# Patient Record
Sex: MALE | Race: WHITE | Employment: UNEMPLOYED | URBAN - METROPOLITAN AREA
[De-identification: names, ages, dates, MRNs, and addresses within clinical notes are randomized per-mention and may not be internally consistent; named-entity substitution may affect disease eponyms.]

---

## 2017-01-26 ENCOUNTER — OFFICE VISIT (OUTPATIENT)
Dept: FAMILY MEDICINE CLINIC | Age: 57
End: 2017-01-26

## 2017-01-26 ENCOUNTER — HOSPITAL ENCOUNTER (OUTPATIENT)
Dept: LAB | Age: 57
Discharge: HOME | End: 2017-01-26

## 2017-01-26 VITALS
OXYGEN SATURATION: 95 % | BODY MASS INDEX: 27.31 KG/M2 | HEART RATE: 66 BPM | DIASTOLIC BLOOD PRESSURE: 70 MMHG | TEMPERATURE: 97.7 F | WEIGHT: 174 LBS | RESPIRATION RATE: 18 BRPM | HEIGHT: 67 IN | SYSTOLIC BLOOD PRESSURE: 120 MMHG

## 2017-01-26 DIAGNOSIS — I10 ESSENTIAL HYPERTENSION: ICD-10-CM

## 2017-01-26 DIAGNOSIS — Z00.00 MEDICARE ANNUAL WELLNESS VISIT, INITIAL: ICD-10-CM

## 2017-01-26 DIAGNOSIS — F41.9 ANXIETY: ICD-10-CM

## 2017-01-26 DIAGNOSIS — M54.2 CERVICAL PAIN: ICD-10-CM

## 2017-01-26 DIAGNOSIS — Z11.59 NEED FOR HEPATITIS C SCREENING TEST: ICD-10-CM

## 2017-01-26 DIAGNOSIS — Z71.89 ADVANCE CARE PLANNING: ICD-10-CM

## 2017-01-26 DIAGNOSIS — J45.40 MODERATE PERSISTENT ASTHMA WITHOUT COMPLICATION: Primary | ICD-10-CM

## 2017-01-26 PROCEDURE — 99001 SPECIMEN HANDLING PT-LAB: CPT | Performed by: FAMILY MEDICINE

## 2017-01-26 RX ORDER — TIZANIDINE 4 MG/1
4 TABLET ORAL
Qty: 40 TAB | Refills: 3 | Status: SHIPPED | OUTPATIENT
Start: 2017-01-26 | End: 2018-02-09 | Stop reason: SDUPTHER

## 2017-01-26 RX ORDER — AMLODIPINE BESYLATE 5 MG/1
5 TABLET ORAL DAILY
Qty: 30 TAB | Refills: 1 | Status: SHIPPED | OUTPATIENT
Start: 2017-01-26 | End: 2017-03-31 | Stop reason: SDUPTHER

## 2017-01-26 RX ORDER — CELECOXIB 200 MG/1
200 CAPSULE ORAL 2 TIMES DAILY
Qty: 180 CAP | Refills: 3 | Status: SHIPPED | OUTPATIENT
Start: 2017-01-26 | End: 2018-04-16 | Stop reason: SDUPTHER

## 2017-01-26 RX ORDER — LORAZEPAM 1 MG/1
1 TABLET ORAL
Qty: 30 TAB | Refills: 2 | Status: SHIPPED | OUTPATIENT
Start: 2017-01-26 | End: 2017-03-23 | Stop reason: SDUPTHER

## 2017-01-26 RX ORDER — LOSARTAN POTASSIUM 25 MG/1
25 TABLET ORAL DAILY
Qty: 90 TAB | Refills: 3 | Status: CANCELLED | OUTPATIENT
Start: 2017-01-26

## 2017-01-26 RX ORDER — CITALOPRAM 10 MG/1
10 TABLET ORAL EVERY EVENING
Qty: 90 TAB | Refills: 3 | Status: SHIPPED | OUTPATIENT
Start: 2017-01-26 | End: 2017-12-18

## 2017-01-26 RX ORDER — FLUTICASONE PROPIONATE 44 UG/1
1 AEROSOL, METERED RESPIRATORY (INHALATION) 2 TIMES DAILY
Qty: 1 INHALER | Refills: 2 | Status: SHIPPED | OUTPATIENT
Start: 2017-01-26 | End: 2017-08-22 | Stop reason: SDUPTHER

## 2017-01-26 RX ORDER — ALBUTEROL SULFATE 90 UG/1
2 AEROSOL, METERED RESPIRATORY (INHALATION)
Qty: 1 INHALER | Refills: 12 | Status: SHIPPED | OUTPATIENT
Start: 2017-01-26 | End: 2017-08-22 | Stop reason: SDUPTHER

## 2017-01-26 NOTE — PATIENT INSTRUCTIONS

## 2017-01-26 NOTE — PROGRESS NOTES
(AWV) The Initial Medicare Annual Wellness Exam PROGRESS NOTE    This is an Initial Medicare Annual Wellness Exam (AWV) (Performed 12 months after IPPE or effective date of Medicare Part B enrollment, Once in a lifetime)    I have reviewed the patient's medical history in detail and updated the computerized patient record. Nicol Terry is a 61 y.o.  male and presents for an annual wellness exam       Patient Active Problem List   Diagnosis Code    Asthma J45.909    Tobacco abuse Z72.0    GERD (gastroesophageal reflux disease) K21.9    Chronic pain G89.29    Anxiety F41.9    Bilateral primary osteoarthritis of hip M16.0     Patient Active Problem List    Diagnosis Date Noted    Bilateral primary osteoarthritis of hip 07/14/2016    Anxiety 05/03/2016    Chronic pain 03/07/2016    GERD (gastroesophageal reflux disease) 06/11/2014    Asthma 11/12/2013    Tobacco abuse 11/12/2013     Current Outpatient Prescriptions   Medication Sig Dispense Refill    tiZANidine (ZANAFLEX) 4 mg tablet Take 1 Tab by mouth every six (6) hours as needed. 40 Tab 0    LORazepam (ATIVAN) 1 mg tablet Take 1 Tab by mouth every eight (8) hours as needed for Anxiety. Max Daily Amount: 3 mg. 30 Tab 1    celecoxib (CELEBREX) 200 mg capsule Take  by mouth two (2) times a day.  citalopram (CELEXA) 10 mg tablet Take 1 Tab by mouth every evening. Indications: GENERALIZED ANXIETY DISORDER 30 Tab 1    losartan (COZAAR) 25 mg tablet Take 1 Tab by mouth daily. Indications: HYPERTENSION 90 Tab 3    albuterol (PROVENTIL HFA, VENTOLIN HFA, PROAIR HFA) 90 mcg/actuation inhaler Take 2 Puffs by inhalation every six (6) hours as needed for Wheezing or Shortness of Breath. 1 Inhaler 12    amLODIPine (NORVASC) 5 mg tablet Take 1 Tab by mouth daily. 30 Tab 1    naproxen sodium (ALEVE) 220 mg cap Take  by mouth.  AMITIZA 24 mcg capsule Take 1 Cap by mouth two (2) times daily (with meals).  (Patient taking differently: Take 24 mcg by mouth two (2) times daily (with meals). Indications: CHRONIC IDIOPATHIC CONSTIPATION) 60 Cap 3     Allergies   Allergen Reactions    Neurontin [Gabapentin] Other (comments)     Upset stomach     Past Medical History   Diagnosis Date    Anxiety     Arthritis      osteo-hips    Asthma      as a child only    Bilateral primary osteoarthritis of hip 7/14/2016    Broken rib april 2013     3 broken ribs s/p fall with hemothorax    Chronic pain      hips, legs and knees    Hypertension     Psychiatric disorder      anxiety     Past Surgical History   Procedure Laterality Date    Hx lumbar diskectomy      Hx hernia repair Left     Hx mohs procedure       (R) rotator cuff     Family History   Problem Relation Age of Onset    Dementia Mother     Cancer Father 61     Social History   Substance Use Topics    Smoking status: Current Every Day Smoker     Packs/day: 0.25     Years: 30.00    Smokeless tobacco: Never Used      Comment: pt will try to quit smoking    Alcohol use No      Comment: quit 1/2016       ROS   General ROS: negative for - chills or fever  Psychological ROS: positive for - anxiety treated as needed with ativan  Ophthalmic ROS: positive for - uses glasses  ENT ROS: negative for - headaches  Endocrine ROS: negative for - polydipsia/polyuria or temperature intolerance  Respiratory ROS: no cough, shortness of breath; + wheezing which responds quickly to albuterol  Cardiovascular ROS: no chest pain or dyspnea on exertion  Gastrointestinal ROS: no abdominal pain, change in bowel habits, or black or bloody stools  Genito-Urinary ROS: no dysuria, trouble voiding, or hematuria  Musculoskeletal ROS: negative for - pain in hip - bilateral  Neurological ROS: no TIA or stroke symptoms  Dermatological ROS: negative for - rash or skin lesion changes    All other systems reviewed and are negative.     History     Past Medical History   Diagnosis Date    Anxiety     Arthritis      osteo-hips  Asthma      as a child only    Bilateral primary osteoarthritis of hip 7/14/2016    Broken rib april 2013     3 broken ribs s/p fall with hemothorax    Chronic pain      hips, legs and knees    Hypertension     Psychiatric disorder      anxiety      Past Surgical History   Procedure Laterality Date    Hx lumbar diskectomy      Hx hernia repair Left     Hx mohs procedure       (R) rotator cuff     Current Outpatient Prescriptions   Medication Sig Dispense Refill    tiZANidine (ZANAFLEX) 4 mg tablet Take 1 Tab by mouth every six (6) hours as needed. 40 Tab 0    LORazepam (ATIVAN) 1 mg tablet Take 1 Tab by mouth every eight (8) hours as needed for Anxiety. Max Daily Amount: 3 mg. 30 Tab 1    celecoxib (CELEBREX) 200 mg capsule Take  by mouth two (2) times a day.  citalopram (CELEXA) 10 mg tablet Take 1 Tab by mouth every evening. Indications: GENERALIZED ANXIETY DISORDER 30 Tab 1    losartan (COZAAR) 25 mg tablet Take 1 Tab by mouth daily. Indications: HYPERTENSION 90 Tab 3    albuterol (PROVENTIL HFA, VENTOLIN HFA, PROAIR HFA) 90 mcg/actuation inhaler Take 2 Puffs by inhalation every six (6) hours as needed for Wheezing or Shortness of Breath. 1 Inhaler 12    amLODIPine (NORVASC) 5 mg tablet Take 1 Tab by mouth daily. 30 Tab 1    naproxen sodium (ALEVE) 220 mg cap Take  by mouth.  AMITIZA 24 mcg capsule Take 1 Cap by mouth two (2) times daily (with meals). (Patient taking differently: Take 24 mcg by mouth two (2) times daily (with meals).  Indications: CHRONIC IDIOPATHIC CONSTIPATION) 60 Cap 3     Allergies   Allergen Reactions    Neurontin [Gabapentin] Other (comments)     Upset stomach     Family History   Problem Relation Age of Onset    Dementia Mother     Cancer Father 61     Social History   Substance Use Topics    Smoking status: Current Every Day Smoker     Packs/day: 0.25     Years: 30.00    Smokeless tobacco: Never Used      Comment: pt will try to quit smoking    Alcohol use No      Comment: quit 1/2016     Patient Active Problem List   Diagnosis Code    Asthma J45.909    Tobacco abuse Z72.0    GERD (gastroesophageal reflux disease) K21.9    Chronic pain G89.29    Anxiety F41.9    Bilateral primary osteoarthritis of hip M16.0       Health Maintenance History  Immunizations reviewed, dtap due , pneumovax up to date, flu up to date, zoster N/A  Colonoscopy: up to date,   Eye exam: due      Depression Risk Factor Screening:      Patient Health Questionnaire (PHQ-2)   Over the last 2 weeks, how often have you been bothered by any of the following problems? · Little interest or pleasure in doing things? · Not at all. [0]  · Feeling down, depressed, or hopeless? · Not at all. [0]    Total Score: 0/6  PHQ-2 Assessment Scoring:   A score of 2 or more requires further screening with the PHQ-9    Alcohol Risk Factor Screening:     Men: On any occasion during the past 3 months, have you had more than 4 drinks containing alcohol?  no  Do you average more than 14 drinks per week?  no    Functional Ability and Level of Safety:     Hearing Loss    none    Activities of Daily Living   Self-care. Requires assistance with: no ADLs    Fall Risk   No fall risk factors    Abuse Screen   None    Examination   Physical Examination  Vitals:    01/26/17 1432   BP: 120/70   Pulse: 66   Resp: 18   Temp: 97.7 °F (36.5 °C)   TempSrc: Oral   SpO2: 95%   Weight: 174 lb (78.9 kg)   Height: 5' 7\" (1.702 m)   PainSc:   0 - No pain      Body mass index is 27.25 kg/(m^2).      Evaluation of Cognitive Function:  Mood/affect:good mood with appropriate affect  Appearance: well kempt  Family member/caregiver input: N/A    alert, well appearing, and in no distress, oriented to person, place, and time and overweight    Patient Care Team:  Hood Webb MD as PCP - General (Family Practice)  Alesia Dover MD (Family Practice)  Audrey Diaz MD (Surgery)    End-of-life planning  Advanced Directive in the case than an injury or illness causes the patient to be unable to make health care decisions    Health Care Directive or Living Will: yes    Advice/Referrals/Counselling/Plan:   Education and counseling provided:  Advance care planning  Hep C screening  weight loss, physical activity, smoking cessation, fall prevention, and nutrition)    ICD-10-CM ICD-9-CM    1. Moderate persistent asthma without complication M88.14 779.53 albuterol (PROVENTIL HFA, VENTOLIN HFA, PROAIR HFA) 90 mcg/actuation inhaler      fluticasone (FLOVENT HFA) 44 mcg/actuation inhaler   2. Essential hypertension I10 401.9    3. Anxiety F41.9 300.00 LORazepam (ATIVAN) 1 mg tablet      citalopram (CELEXA) 10 mg tablet   4. Elevated blood pressure I10 401.9 amLODIPine (NORVASC) 5 mg tablet   5. Cervical pain M54.2 723.1 tiZANidine (ZANAFLEX) 4 mg tablet   6. Medicare annual wellness visit, initial Z00.00 V70.0 HEMOGLOBIN A1C WITH EAG      LIPID PANEL   7. Advance care planning Z71.89 V65.49    8. Need for hepatitis C screening test Z11.59 V73.89 HEPATITIS C AB   . Brief written plan, checklist    I have discussed the diagnosis with the patient and the intended plan as seen in the above orders. The patient has received an after-visit summary and questions were answered concerning future plans. I have discussed medication side effects and warnings with the patient as well. I have reviewed the plan of care with the patient, accepted their input and they are in agreement with the treatment goals. Follow-up Disposition:  Return in about 3 months (around 4/26/2017) for medication evaluation. ____________________________________________________________    Problem Assessment    for treatment of   Chief Complaint   Patient presents with    Annual Wellness Visit         SUBJECTIVE    Hypertension  Patient is here for follow-up of hypertension. He indicates that he is feeling well and denies any symptoms referable to his hypertension.  He is exercising and is adherent to low salt diet. Blood pressure is well controlled at home. Use of agents associated with hypertension: NSAIDS. Well Adult Physical   Patient here for a comprehensive physical exam.The patient reports problems - anxiety, asthma, osteoarthritis  Do you take any herbs or supplements that were not prescribed by a doctor? no Are you taking calcium supplements? no Are you taking aspirin daily? no    Asthma Review:  The patient is being seen for follow up of asthma, not currently in exacerbation. Asthma symptoms occur: > 2 times per week. Wheezing when present is described as mild-moderate and easily relieved with rescue bronchodilator. Current limitations in activity from asthma: none. Number of days of school or work missed in the last month: 0. Frequency of use of quick-relief meds: > 2 times per week. Regimen compliance: The patient reports adherence to this regimen. Patient does smoke cigarettes now less than 1/2 pack. Anxiety Review:  Patient is seen for anxiety disorder. Current treatment includes Celexa, Ativan and no other therapies. Ongoing symptoms include: palpitations, dizziness, racing thoughts, psychomotor agitation, feelings of losing control, difficulty concentrating but improved. Patient denies: chest pain, suicidal ideation. Reported side effects from the treatment: none. Visit Vitals    /70 (BP 1 Location: Left arm, BP Patient Position: Sitting)    Pulse 66    Temp 97.7 °F (36.5 °C) (Oral)    Resp 18    Ht 5' 7\" (1.702 m)    Wt 174 lb (78.9 kg)    SpO2 95%    BMI 27.25 kg/m2     General:  Alert, cooperative, no distress, appears stated age. Head:  Normocephalic, without obvious abnormality, atraumatic. Eyes:  Conjunctivae/corneas clear. PERRL, EOMs intact. Fundi benign   Ears:  Normal TMs and external ear canals both ears. Nose: Nares normal. Septum midline. Mucosa normal. No drainage or sinus tenderness.    Throat: Lips, mucosa, and tongue normal. Teeth and gums normal.   Neck: Supple, symmetrical, trachea midline, no adenopathy, thyroid: no enlargement/tenderness/nodules, no carotid bruit and no JVD. Back:   Symmetric, no curvature. ROM normal. No CVA tenderness. Lungs:   Clear to auscultation bilaterally. Chest wall:  No tenderness or deformity. Heart:  Regular rate and rhythm, S1, S2 normal, no murmur, click, rub or gallop. Abdomen:   Soft, non-tender. Bowel sounds normal. No masses,  No organomegaly. Genitalia:  deferred   Rectal:  deferred   Extremities: Extremities normal, atraumatic, no cyanosis or edema. Pulses: 2+ and symmetric all extremities. Skin: Skin color, texture, turgor normal. No rashes or lesions   Lymph nodes: Cervical, supraclavicular, and axillary nodes normal.   Neurologic: CNII-XII intact. Normal strength, sensation and reflexes throughout. Assessment/Plan:    1. Essential hypertension  Goal < 140/90; currently well controlled; continue treatment  - amLODIPine (NORVASC) 5 mg tablet; Take 1 Tab by mouth daily. Dispense: 30 Tab; Refill: 1    2. Anxiety  Continue current regimen  - LORazepam (ATIVAN) 1 mg tablet; Take 1 Tab by mouth every eight (8) hours as needed for Anxiety. Max Daily Amount: 3 mg. Dispense: 30 Tab; Refill: 2  - citalopram (CELEXA) 10 mg tablet; Take 1 Tab by mouth every evening. Indications: GENERALIZED ANXIETY DISORDER  Dispense: 90 Tab; Refill: 3    3. Cervical pain  Improved; continue muscle relaxer for use as needed  - tiZANidine (ZANAFLEX) 4 mg tablet; Take 1 Tab by mouth every six (6) hours as needed. Dispense: 40 Tab; Refill: 3    4. Moderate persistent asthma without complication  Encourage smoking cessation; start fluticasone due to frequency of rescue inhaler use  - albuterol (PROVENTIL HFA, VENTOLIN HFA, PROAIR HFA) 90 mcg/actuation inhaler; Take 2 Puffs by inhalation every six (6) hours as needed for Wheezing or Shortness of Breath. Dispense: 1 Inhaler;  Refill: 12  - fluticasone (FLOVENT HFA) 44 mcg/actuation inhaler; Take 1 Puff by inhalation two (2) times a day. Indications: MAINTENANCE THERAPY FOR ASTHMA  Dispense: 1 Inhaler; Refill: 2    5. Medicare annual wellness visit, initial  Reviewed preventive recommendations  - HEMOGLOBIN A1C WITH EAG; Future  - LIPID PANEL; Future    6. Advance care planning  See ACP    7. Need for hepatitis C screening test    - HEPATITIS C AB;  Future      Lab review: orders written for new lab studies as appropriate; see orders

## 2017-01-26 NOTE — PROGRESS NOTES
Alexander Alcantara is a 61 y.o. male presents today for medication evaluation and medication refill Ativan, Celebrex, Losartan, Albuterol

## 2017-01-26 NOTE — MR AVS SNAPSHOT
Visit Information Date & Time Provider Department Dept. Phone Encounter #  
 1/26/2017  2:15 PM Miguel Rodríguez, [address] [phone] [id number] Follow-up Instructions Return in about 3 months (around 4/26/2017) for medication evaluation. Upcoming Health Maintenance Date Due Hepatitis C Screening 1960 DTaP/Tdap/Td series (1 - Tdap) 3/14/1980 COLONOSCOPY 6/11/2024 Allergies as of 1/26/2017  Review Complete On: 1/26/2017 By: Miguel Rodríguez MD  
  
 Severity Noted Reaction Type Reactions Neurontin [Gabapentin]  06/11/2014    Other (comments) Upset stomach Current Immunizations  Reviewed on 9/6/2016 Name Date Influenza Vaccine (Quad) PF 9/6/2016 11:52 AM, 11/6/2015  2:37 PM  
 Influenza Vaccine PF 11/14/2013  1:31 PM  
 Pneumococcal Polysaccharide (PPSV-23) 2/5/2016 Not reviewed this visit You Were Diagnosed With   
  
 Codes Comments Moderate persistent asthma without complication    -  Primary ICD-10-CM: J45.40 ICD-9-CM: 493.90 Essential hypertension     ICD-10-CM: I10 
ICD-9-CM: 401.9 Anxiety     ICD-10-CM: F41.9 ICD-9-CM: 300.00 Elevated blood pressure     ICD-10-CM: I10 
ICD-9-CM: 401.9 Cervical pain     ICD-10-CM: M54.2 ICD-9-CM: 723.1 Medicare annual wellness visit, initial     ICD-10-CM: Z00.00 ICD-9-CM: V70.0 Advance care planning     ICD-10-CM: Z71.89 ICD-9-CM: V65.49 Need for hepatitis C screening test     ICD-10-CM: Z11.59 
ICD-9-CM: V73.89 Vitals BP Pulse Temp Resp Height(growth percentile) Weight(growth percentile) 120/70 (BP 1 Location: Left arm, BP Patient Position: Sitting) 66 97.7 °F (36.5 °C) (Oral) 18 5' 7\" (1.702 m) 174 lb (78.9 kg) SpO2 BMI Smoking Status 95% 27.25 kg/m2 Current Every Day Smoker BMI and BSA Data Body Mass Index Body Surface Area  
 27.25 kg/m 2 1.93 m 2 Preferred Pharmacy Pharmacy Name Phone [facility] [address], [address] [phone] Your Updated Medication List  
  
   
This list is accurate as of: 1/26/17  3:09 PM.  Always use your most recent med list.  
  
  
  
  
 albuterol 90 mcg/actuation inhaler Commonly known as:  PROVENTIL HFA, VENTOLIN HFA, PROAIR HFA Take 2 Puffs by inhalation every six (6) hours as needed for Wheezing or Shortness of Breath. AMITIZA 24 mcg capsule Generic drug:  lubiPROStone Take 1 Cap by mouth two (2) times daily (with meals). amLODIPine 5 mg tablet Commonly known as:  Wandra Jayne Take 1 Tab by mouth daily. celecoxib 200 mg capsule Commonly known as:  CeleBREX Take 1 Cap by mouth two (2) times a day. citalopram 10 mg tablet Commonly known as:  Shy Babe Take 1 Tab by mouth every evening. Indications: GENERALIZED ANXIETY DISORDER  
  
 fluticasone 44 mcg/actuation inhaler Commonly known as:  FLOVENT HFA Take 1 Puff by inhalation two (2) times a day. Indications: MAINTENANCE THERAPY FOR ASTHMA LORazepam 1 mg tablet Commonly known as:  ATIVAN Take 1 Tab by mouth every eight (8) hours as needed for Anxiety. Max Daily Amount: 3 mg.  
  
 losartan 25 mg tablet Commonly known as:  COZAAR Take 1 Tab by mouth daily. Indications: HYPERTENSION  
  
 tiZANidine 4 mg tablet Commonly known as:  Henrietta Banter Take 1 Tab by mouth every six (6) hours as needed. Prescriptions Printed Refills LORazepam (ATIVAN) 1 mg tablet 2 Sig: Take 1 Tab by mouth every eight (8) hours as needed for Anxiety. Max Daily Amount: 3 mg. Class: Print Route: Oral  
  
Prescriptions Sent to Pharmacy Refills  
 albuterol (PROVENTIL HFA, VENTOLIN HFA, PROAIR HFA) 90 mcg/actuation inhaler 12 Sig: Take 2 Puffs by inhalation every six (6) hours as needed for Wheezing or Shortness of Breath.   
 Class: Normal  
 Pharmacy: [address], [address] Ph #: [phone] Route: Inhalation  
 citalopram (CELEXA) 10 mg tablet 3 Sig: Take 1 Tab by mouth every evening. Indications: GENERALIZED ANXIETY DISORDER Class: Normal  
 Pharmacy: [address] [place] [address]. Rd[address] [place] [address], [address] Ph #: [phone] Route: Oral  
 celecoxib (CELEBREX) 200 mg capsule 3 Sig: Take 1 Cap by mouth two (2) times a day. Class: Normal  
 Pharmacy: [address] [place] [address]. Rd[address] [place] [address], [address] Ph #: [phone] Route: Oral  
 amLODIPine (NORVASC) 5 mg tablet 1 Sig: Take 1 Tab by mouth daily. Class: Normal  
 Pharmacy: [address] [place] [address]. Rd[address] [place] [address], [address] Ph #: [phone] Route: Oral  
 tiZANidine (ZANAFLEX) 4 mg tablet 3 Sig: Take 1 Tab by mouth every six (6) hours as needed. Class: Normal  
 Pharmacy: [address] [place] [address]. Rd[address] [place] [address], [address] Ph #: [phone] Route: Oral  
 fluticasone (FLOVENT HFA) 44 mcg/actuation inhaler 2 Sig: Take 1 Puff by inhalation two (2) times a day. Indications: MAINTENANCE THERAPY FOR ASTHMA Class: Normal  
 Pharmacy: [address] [place] [address]. Rd[address] [place] [address], [address] Ph #: [phone] Route: Inhalation Follow-up Instructions Return in about 3 months (around 4/26/2017) for medication evaluation. To-Do List   
 01/26/2017 Lab:  HEPATITIS C AB Patient Instructions Well Visit, Men 48 to 72: Care Instructions Your Care Instructions Physical exams can help you stay healthy. Your doctor has checked your overall health and may have suggested ways to take good care of yourself. He or she also may have recommended tests. At home, you can help prevent illness with healthy eating, regular exercise, and other steps. Follow-up care is a key part of your treatment and safety.  Be sure to make and go to all appointments, and call your doctor if you are having problems. It's also a good idea to know your test results and keep a list of the medicines you take. How can you care for yourself at home? · Reach and stay at a healthy weight. This will lower your risk for many problems, such as obesity, diabetes, heart disease, and high blood pressure. · Get at least 30 minutes of exercise on most days of the week. Walking is a good choice. You also may want to do other activities, such as running, swimming, cycling, or playing tennis or team sports. · Do not smoke. Smoking can make health problems worse. If you need help quitting, talk to your doctor about stop-smoking programs and medicines. These can increase your chances of quitting for good. · Protect your skin from too much sun. When you're outdoors from 10 a.m. to 4 p.m., stay in the shade or cover up with clothing and a hat with a wide brim. Wear sunglasses that block UV rays. Even when it's cloudy, put broad-spectrum sunscreen (SPF 30 or higher) on any exposed skin. · See a dentist one or two times a year for checkups and to have your teeth cleaned. · Wear a seat belt in the car. · Limit alcohol to 2 drinks a day. Too much alcohol can cause health problems. Follow your doctor's advice about when to have certain tests. These tests can spot problems early. · Cholesterol. Your doctor will tell you how often to have this done based on your overall health and other things that can increase your risk for heart attack and stroke. · Blood pressure. Have your blood pressure checked during a routine doctor visit. Your doctor will tell you how often to check your blood pressure based on your age, your blood pressure results, and other factors. · Prostate exam. Talk to your doctor about whether you should have a blood test (called a PSA test) for prostate cancer. Experts disagree on whether men should have this test. Some experts recommend that you discuss the benefits and risks of the test with your doctor. · Diabetes. Ask your doctor whether you should have tests for diabetes. · Vision. Some experts recommend that you have yearly exams for glaucoma and other age-related eye problems starting at age 48. · Hearing. Tell your doctor if you notice any change in your hearing. You can have tests to find out how well you hear. · Colon cancer. You should begin tests for colon cancer at age 48. You may have one of several tests. Your doctor will tell you how often to have tests based on your age and risk. Risks include whether you already had a precancerous polyp removed from your colon or whether your parent, brother, sister, or child has had colon cancer. · Heart attack and stroke risk. At least every 4 to 6 years, you should have your risk for heart attack and stroke assessed. Your doctor uses factors such as your age, blood pressure, cholesterol, and whether you smoke or have diabetes to show what your risk for a heart attack or stroke is over the next 10 years. · Abdominal aortic aneurysm. Ask your doctor whether you should have a test to check for an aneurysm. You may need a test if you ever smoked or if your parent, brother, sister, or child has had an aneurysm. When should you call for help? Watch closely for changes in your health, and be sure to contact your doctor if you have any problems or symptoms that concern you. Where can you learn more? Go to http://carolina-misbah.info/. Enter L558 in the search box to learn more about \"Well Visit, Men 48 to 72: Care Instructions. \" Current as of: July 19, 2016 Content Version: 11.1 © 1295-3855 [employer]. Care instructions adapted under license by [employer] (which disclaims liability or warranty for this information). If you have questions about a medical condition or this instruction, always ask your healthcare professional. [place] [address] any warranty or liability for your use of this information. Introducing [facility] & HEALTH SERVICES! Dear Derrek Mcgraw: Thank you for requesting a [employer] account. Our records indicate that you already have an active [employer] account. You can access your account anytime at [email or web address] Did you know that you can access your hospital and ER discharge instructions at any time in [employer]? You can also review all of your test results from your hospital stay or ER visit. Additional Information If you have questions, please visit the Frequently Asked Questions section of the [employer] website at [email or web address] Remember, [employer] is NOT to be used for urgent needs. For medical emergencies, dial 911. Now available from your iPhone and Android! Please provide this summary of care documentation to your next provider. Your primary care clinician is listed as Glennalee Pages. If you have any questions after today's visit, please call [phone].

## 2017-01-27 LAB
CHOLEST SERPL-MCNC: 215 MG/DL (ref 100–199)
EST. AVERAGE GLUCOSE BLD GHB EST-MCNC: 120 MG/DL
HBA1C MFR BLD: 5.8 % (ref 4.8–5.6)
HCV AB S/CO SERPL IA: <0.1 S/CO RATIO (ref 0–0.9)
HDLC SERPL-MCNC: 74 MG/DL
INTERPRETATION, 910389: NORMAL
LDLC SERPL CALC-MCNC: 112 MG/DL (ref 0–99)
TRIGL SERPL-MCNC: 143 MG/DL (ref 0–149)
VLDLC SERPL CALC-MCNC: 29 MG/DL (ref 5–40)

## 2017-01-30 NOTE — ACP (ADVANCE CARE PLANNING)
Advance Care Planning (ACP) Provider Note - Comprehensive     Date of ACP Conversation: 01/30/17  Persons included in Conversation:  patient  Length of ACP Conversation in minutes:  <16 minutes (Non-Billable)    Authorized Decision Maker (if patient is incapable of making informed decisions): This person is:  his sister          General ACP for ALL Patients with Decision Making Capacity:   Importance of advance care planning, including choosing a healthcare agent to communicate patient's healthcare decisions if patient lost the ability to make decisions, such as after a sudden illness or accident  Exploration of values, goals, and preferences if recovery is not expected, even with continued medical treatment in the event of: Imminent death  Severe, permanent brain injury  \"In these circumstances, what matters most to you? \"  Care focused more on comfort or quality of life. \"What, if any, treatments would you want to avoid? \" nothing at this time  Opportunity offered to explore how cultural, Amish, spiritual, or personal beliefs would affect decisions for future care     Review of Existing Advance Directive:  What information were you given about medical decisions to consider before completing your advance directive? nothing    For Serious or Chronic Illness:  Understanding of medical condition      Interventions Provided:  Reviewed existing Advance Directive

## 2017-03-23 ENCOUNTER — OFFICE VISIT (OUTPATIENT)
Dept: FAMILY MEDICINE CLINIC | Age: 57
End: 2017-03-23

## 2017-03-23 VITALS
RESPIRATION RATE: 18 BRPM | WEIGHT: 178 LBS | BODY MASS INDEX: 27.94 KG/M2 | HEIGHT: 67 IN | TEMPERATURE: 97.8 F | HEART RATE: 78 BPM | OXYGEN SATURATION: 97 % | SYSTOLIC BLOOD PRESSURE: 122 MMHG | DIASTOLIC BLOOD PRESSURE: 76 MMHG

## 2017-03-23 DIAGNOSIS — J45.40 MODERATE PERSISTENT ASTHMA WITHOUT COMPLICATION: ICD-10-CM

## 2017-03-23 DIAGNOSIS — B02.9 HERPES ZOSTER WITHOUT COMPLICATION: Primary | ICD-10-CM

## 2017-03-23 DIAGNOSIS — F41.9 ANXIETY: ICD-10-CM

## 2017-03-23 RX ORDER — VALACYCLOVIR HYDROCHLORIDE 1 G/1
1000 TABLET, FILM COATED ORAL 2 TIMES DAILY
Qty: 14 TAB | Refills: 0 | Status: SHIPPED | OUTPATIENT
Start: 2017-03-23 | End: 2017-03-30

## 2017-03-23 RX ORDER — OXYCODONE AND ACETAMINOPHEN 5; 325 MG/1; MG/1
1 TABLET ORAL
Qty: 20 TAB | Refills: 0 | Status: SHIPPED | OUTPATIENT
Start: 2017-03-23 | End: 2017-04-19 | Stop reason: SDUPTHER

## 2017-03-23 RX ORDER — LORAZEPAM 1 MG/1
1 TABLET ORAL
Qty: 30 TAB | Refills: 2 | Status: SHIPPED | OUTPATIENT
Start: 2017-03-23 | End: 2017-09-29 | Stop reason: SDUPTHER

## 2017-03-23 RX ORDER — AMITRIPTYLINE HYDROCHLORIDE 25 MG/1
25 TABLET, FILM COATED ORAL
Qty: 30 TAB | Refills: 0 | Status: SHIPPED | OUTPATIENT
Start: 2017-03-23 | End: 2018-09-07

## 2017-03-23 NOTE — MR AVS SNAPSHOT
Visit Information Date & Time Provider Department Dept. Phone Encounter #  
 3/23/2017  3:45 PM Helen Landa, [address] [facility] [phone] [id number] Follow-up Instructions Return if symptoms worsen or fail to improve. Your Appointments 3/23/2017  3:45 PM  
Follow Up with Helen Landa MD  
[address] [facility] [address] [place] [address]) Appt Note: RASH ON SHOULDER  
 [address] [facility] [address]  
  
   
 [address] Upcoming Health Maintenance Date Due DTaP/Tdap/Td series (1 - Tdap) 3/14/1980 COLONOSCOPY 6/11/2024 Allergies as of 3/23/2017  Review Complete On: 3/23/2017 By: Helen Landa MD  
  
 Severity Noted Reaction Type Reactions Neurontin [Gabapentin]  06/11/2014    Other (comments) Upset stomach Current Immunizations  Reviewed on 9/6/2016 Name Date Influenza Vaccine (Quad) PF 9/6/2016 11:52 AM, 11/6/2015  2:37 PM  
 Influenza Vaccine PF 11/14/2013  1:31 PM  
 Pneumococcal Polysaccharide (PPSV-23) 2/5/2016 Not reviewed this visit You Were Diagnosed With   
  
 Codes Comments Herpes zoster without complication    -  Primary ICD-10-CM: B02.9 ICD-9-CM: 053.9 Anxiety     ICD-10-CM: F41.9 ICD-9-CM: 300.00 Moderate persistent asthma without complication     XFD-34-ER: J45.40 ICD-9-CM: 493.90 Vitals BP Pulse Temp Resp Height(growth percentile) Weight(growth percentile) 122/76 (BP 1 Location: Right arm, BP Patient Position: Sitting) 78 97.8 °F (36.6 °C) (Oral) 18 5' 7\" (1.702 m) 178 lb (80.7 kg) SpO2 BMI Smoking Status 97% 27.88 kg/m2 Current Every Day Smoker BMI and BSA Data Body Mass Index Body Surface Area  
 27.88 kg/m 2 1.95 m 2 Preferred Pharmacy Pharmacy Name Phone [facility] [address], [address] [place] [address] [phone] Your Updated Medication List  
  
 This list is accurate as of: 3/23/17  3:37 PM.  Always use your most recent med list.  
  
  
  
  
 albuterol 90 mcg/actuation inhaler Commonly known as:  PROVENTIL HFA, VENTOLIN HFA, PROAIR HFA Take 2 Puffs by inhalation every six (6) hours as needed for Wheezing or Shortness of Breath. AMITIZA 24 mcg capsule Generic drug:  lubiPROStone Take 1 Cap by mouth two (2) times daily (with meals). amitriptyline 25 mg tablet Commonly known as:  ELAVIL Take 1 Tab by mouth nightly. amLODIPine 5 mg tablet Commonly known as:  Rylie Spruce Take 1 Tab by mouth daily. celecoxib 200 mg capsule Commonly known as:  CeleBREX Take 1 Cap by mouth two (2) times a day. citalopram 10 mg tablet Commonly known as:  [place] Party Take 1 Tab by mouth every evening. Indications: GENERALIZED ANXIETY DISORDER  
  
 fluticasone 44 mcg/actuation inhaler Commonly known as:  FLOVENT HFA Take 1 Puff by inhalation two (2) times a day. Indications: MAINTENANCE THERAPY FOR ASTHMA LORazepam 1 mg tablet Commonly known as:  ATIVAN Take 1 Tab by mouth every eight (8) hours as needed for Anxiety. Max Daily Amount: 3 mg.  
  
 losartan 25 mg tablet Commonly known as:  COZAAR Take 1 Tab by mouth daily. Indications: HYPERTENSION  
  
 oxyCODONE-acetaminophen 5-325 mg per tablet Commonly known as:  PERCOCET Take 1 Tab by mouth every four (4) hours as needed for Pain. Max Daily Amount: 6 Tabs. tiZANidine 4 mg tablet Commonly known as:  Jessika Donato Take 1 Tab by mouth every six (6) hours as needed. valACYclovir 1 gram tablet Commonly known as:  VALTREX Take 1 Tab by mouth two (2) times a day for 7 days. Prescriptions Printed Refills LORazepam (ATIVAN) 1 mg tablet 2 Sig: Take 1 Tab by mouth every eight (8) hours as needed for Anxiety. Max Daily Amount: 3 mg. Class: Print  Route: Oral  
 oxyCODONE-acetaminophen (PERCOCET) 5-325 mg per tablet 0  
 Sig: Take 1 Tab by mouth every four (4) hours as needed for Pain. Max Daily Amount: 6 Tabs. Class: Print Route: Oral  
  
Prescriptions Sent to Pharmacy Refills  
 valACYclovir (VALTREX) 1 gram tablet 0 Sig: Take 1 Tab by mouth two (2) times a day for 7 days. Class: Normal  
 Pharmacy: [address], [address] Ph #: [phone] Route: Oral  
 amitriptyline (ELAVIL) 25 mg tablet 0 Sig: Take 1 Tab by mouth nightly. Class: Normal  
 Pharmacy: [address], [address] Ph #: [phone] Route: Oral  
  
Follow-up Instructions Return if symptoms worsen or fail to improve. Patient Instructions Shingles: Care Instructions Your Care Instructions Shingles (herpes zoster) causes pain and a blistered rash. The rash can appear anywhere on the body but will be on only one side of the body, the left or right. It will be in a band, a strip, or a small area. The pain can be very severe. Shingles can also cause tingling or itching in the area of the rash. The blisters scab over after a few days and heal in 2 to 4 weeks. Medicines can help you feel better and may help prevent more serious problems caused by shingles. Shingles is caused by the same virus that causes chickenpox. When you have chickenpox, the virus gets into your nerve roots and stays there (becomes dormant) long after you get over the chickenpox. If the virus becomes active again, it can cause shingles. Follow-up care is a key part of your treatment and safety. Be sure to make and go to all appointments, and call your doctor if you are having problems. It's also a good idea to know your test results and keep a list of the medicines you take. How can you care for yourself at home? · Be safe with medicines. Take your medicines exactly as prescribed. Call your doctor if you think you are having a problem with your medicine. Antiviral medicine helps you get better faster. · Try not to scratch or pick at the blisters. They will crust over and fall off on their own if you leave them alone. · Put cool, wet cloths on the area to relieve pain and itching. You can also use calamine lotion. Try not to use so much lotion that it cakes and is hard to get off. · Put cornstarch or baking soda on the sores to help dry them out so they heal faster. · Do not use thick ointment, such as petroleum jelly, on the sores. This will keep them from drying and healing. · To help remove loose crusts, soak them in tap water. This can help decrease oozing, and dry and soothe the skin. · Take an over-the-counter pain medicine, such as acetaminophen (Tylenol), ibuprofen (Advil, Motrin), or naproxen (Aleve). Read and follow all instructions on the label. · Avoid close contact with people until the blisters have healed. It is very important for you to avoid contact with anyone who has never had chickenpox or the chickenpox vaccine. Pregnant women, young babies, and anyone else who has a hard time fighting infection (such as someone with HIV, diabetes, or cancer) is especially at risk. When should you call for help? Call your doctor now or seek immediate medical care if: 
· You have a new or higher fever. · You have a severe headache and a stiff neck. · You lose the ability to think clearly. · The rash spreads to your forehead, nose, eyes, or eyelids. · You have eye pain, or your vision gets worse. · You have new pain in your face, or you cannot move the muscles in your face. · Blisters spread to new parts of your body. Watch closely for changes in your health, and be sure to contact your doctor if: · The rash has not healed after 2 to 4 weeks. · You still have pain after the rash has healed. Where can you learn more? Go to http://carolina-misbah.info/.  
Fuentes Scott in the search box to learn more about \"Shingles: Care Instructions. \" Current as of: May 24, 2016 Content Version: 11.1 © 0969-9467 [employer] Tric[employer], Str[employer]. Care instructions adapted under license by [employer] (which disclaims liability or warranty for this information). If you have questions about a medical condition or this instruction, always ask your healthcare professional. [address] any warranty or liability for your use of this information. Introducing [facility] & HEALTH SERVICES! Dear Ranjeet Clay: Thank you for requesting a [employer] account. Our records indicate that you have previously registered for a [employer] account but its currently inactive. Please call our [employer] support line at [phone]. Additional Information If you have questions, please visit the Frequently Asked Questions section of the [employer] website at [email or web address]/[email or web address] [email or web address] Remember, [employer] is NOT to be used for urgent needs. For medical emergencies, dial 911. Now available from your iPhone and Android! Please provide this summary of care documentation to your next provider. Your primary care clinician is listed as Phil Osgood. If you have any questions after today's visit, please call [phone].

## 2017-03-23 NOTE — PATIENT INSTRUCTIONS

## 2017-03-23 NOTE — PROGRESS NOTES
Moises Cisse is a 61 y.o.  male and presents with    Chief Complaint   Patient presents with    Rash     Subjective:  Rash  Patient complains of rash involving the trunk, left shoulder. Rash started 4 days ago. Appearance of rash at onset: Color of lesion(s): pink and crusting. Rash has changed over time Initial distribution: left side of chest.  Discomfort associated with rash: painful, pruritic. Associated symptoms: no associated symptoms. Denies: fever, cough, congestion, sore throat, headache, abdominal pain, nausea, vomiting, myalgia, irritability. Patient has not had previous evaluation of rash. Patient has not had previous treatment. Response to treatment: ineffective. Patient has not had contacts with similar rash. Patient has not identified precipitant. Patient has not had new exposures (soaps, lotions, laundry detergents, foods, medications, plants, insects or animals.)  Asthma Review:  The patient is being seen for follow up of asthma, not currently in exacerbation. Asthma symptoms occur: > 2 times per week. Wheezing when present is described as mild-moderate and easily relieved with rescue bronchodilator. Current limitations in activity from asthma: none. Number of days of school or work missed in the last month: 0. Frequency of use of quick-relief meds: > 2 times per week. Regimen compliance: The patient reports adherence to this regimen. Patient does smoke cigarettes now less than 1/2 pack. Anxiety Review:  Patient is seen for anxiety disorder. Current treatment includes Celexa, Ativan and no other therapies. Ongoing symptoms include: palpitations, dizziness, racing thoughts, psychomotor agitation, feelings of losing control, difficulty concentrating but improved. Patient denies: chest pain, suicidal ideation. Reported side effects from the treatment: none.     Patient Active Problem List   Diagnosis Code    Asthma J45.909    Tobacco abuse Z72.0    GERD (gastroesophageal reflux disease) K21.9    Chronic pain G89.29    Anxiety F41.9    Bilateral primary osteoarthritis of hip M16.0    Advance care planning Z71.89     Patient Active Problem List    Diagnosis Date Noted    Advance care planning 01/26/2017    Bilateral primary osteoarthritis of hip 07/14/2016    Anxiety 05/03/2016    Chronic pain 03/07/2016    GERD (gastroesophageal reflux disease) 06/11/2014    Asthma 11/12/2013    Tobacco abuse 11/12/2013     Current Outpatient Prescriptions   Medication Sig Dispense Refill    albuterol (PROVENTIL HFA, VENTOLIN HFA, PROAIR HFA) 90 mcg/actuation inhaler Take 2 Puffs by inhalation every six (6) hours as needed for Wheezing or Shortness of Breath. 1 Inhaler 12    LORazepam (ATIVAN) 1 mg tablet Take 1 Tab by mouth every eight (8) hours as needed for Anxiety. Max Daily Amount: 3 mg. 30 Tab 2    citalopram (CELEXA) 10 mg tablet Take 1 Tab by mouth every evening. Indications: GENERALIZED ANXIETY DISORDER 90 Tab 3    celecoxib (CELEBREX) 200 mg capsule Take 1 Cap by mouth two (2) times a day. 180 Cap 3    amLODIPine (NORVASC) 5 mg tablet Take 1 Tab by mouth daily. 30 Tab 1    tiZANidine (ZANAFLEX) 4 mg tablet Take 1 Tab by mouth every six (6) hours as needed. 40 Tab 3    fluticasone (FLOVENT HFA) 44 mcg/actuation inhaler Take 1 Puff by inhalation two (2) times a day. Indications: MAINTENANCE THERAPY FOR ASTHMA 1 Inhaler 2    AMITIZA 24 mcg capsule Take 1 Cap by mouth two (2) times daily (with meals). (Patient taking differently: Take 24 mcg by mouth two (2) times daily (with meals). Indications: CHRONIC IDIOPATHIC CONSTIPATION) 60 Cap 3    losartan (COZAAR) 25 mg tablet Take 1 Tab by mouth daily.  Indications: HYPERTENSION 90 Tab 3     Allergies   Allergen Reactions    Neurontin [Gabapentin] Other (comments)     Upset stomach     Past Medical History:   Diagnosis Date    Anxiety     Arthritis     osteo-hips    Asthma     as a child only    Bilateral primary osteoarthritis of hip 7/14/2016    Broken rib april 2013    3 broken ribs s/p fall with hemothorax    Chronic pain     hips, legs and knees    Hypertension     Psychiatric disorder     anxiety     Past Surgical History:   Procedure Laterality Date    HX HERNIA REPAIR Left     HX LUMBAR DISKECTOMY      HX MOHS PROCEDURES      (R) rotator cuff     Family History   Problem Relation Age of Onset    Dementia Mother     Cancer Father 61     Social History   Substance Use Topics    Smoking status: Current Every Day Smoker     Packs/day: 0.25     Years: 30.00    Smokeless tobacco: Never Used      Comment: pt will try to quit smoking    Alcohol use No      Comment: quit 1/2016       ROS   General ROS: negative for - chills or fever  Psychological ROS: positive for - anxiety  Ophthalmic ROS: positive for - uses glasses  ENT ROS: negative for - headaches  Endocrine ROS: negative for - polydipsia/polyuria or temperature intolerance  Respiratory ROS: positive for - wheezing intermittently  Cardiovascular ROS: negative for - palpitations  Gastrointestinal ROS: no abdominal pain, change in bowel habits, or black or bloody stools  Genito-Urinary ROS: no dysuria, trouble voiding, or hematuria  Musculoskeletal ROS: hips improved; no longer taking celebrex  Neurological ROS: no TIA or stroke symptoms    All other systems reviewed and are negative. Objective:  Vitals:    03/23/17 1515   BP: 122/76   Pulse: 78   Resp: 18   Temp: 97.8 °F (36.6 °C)   TempSrc: Oral   SpO2: 97%   Weight: 178 lb (80.7 kg)   Height: 5' 7\" (1.702 m)   PainSc:   8   PainLoc: Shoulder       alert, well appearing, and in no distress, oriented to person, place, and time and overweight  Mental status - anxious  Neurological - cranial nerves II through XII intact, normal gait and station  Skin - LESIONS NOTED: vesicles on the left chest around shoulder in band    Assessment/Plan:    1.  Herpes zoster without complication  Educated; treat pain and infection  - valACYclovir (VALTREX) 1 gram tablet; Take 1 Tab by mouth two (2) times a day for 7 days. Dispense: 14 Tab; Refill: 0  - oxyCODONE-acetaminophen (PERCOCET) 5-325 mg per tablet; Take 1 Tab by mouth every four (4) hours as needed for Pain. Max Daily Amount: 6 Tabs. Dispense: 20 Tab; Refill: 0  - amitriptyline (ELAVIL) 25 mg tablet; Take 1 Tab by mouth nightly. Dispense: 30 Tab; Refill: 0    2. Anxiety  Continue lorazepam  - LORazepam (ATIVAN) 1 mg tablet; Take 1 Tab by mouth every eight (8) hours as needed for Anxiety. Max Daily Amount: 3 mg. Dispense: 30 Tab; Refill: 2    3. Moderate persistent asthma without complication  Continue maintenance      Lab review: no lab studies available for review at time of visit      I have discussed the diagnosis with the patient and the intended plan as seen in the above orders. The patient has received an after-visit summary and questions were answered concerning future plans. I have discussed medication side effects and warnings with the patient as well. I have reviewed the plan of care with the patient, accepted their input and they are in agreement with the treatment goals. Follow-up Disposition:  Return if symptoms worsen or fail to improve.

## 2017-04-19 ENCOUNTER — OFFICE VISIT (OUTPATIENT)
Dept: FAMILY MEDICINE CLINIC | Age: 57
End: 2017-04-19

## 2017-04-19 VITALS
WEIGHT: 172.4 LBS | HEIGHT: 67 IN | DIASTOLIC BLOOD PRESSURE: 83 MMHG | HEART RATE: 63 BPM | SYSTOLIC BLOOD PRESSURE: 139 MMHG | OXYGEN SATURATION: 97 % | TEMPERATURE: 97.6 F | RESPIRATION RATE: 18 BRPM | BODY MASS INDEX: 27.06 KG/M2

## 2017-04-19 DIAGNOSIS — M75.52 SUBACROMIAL BURSITIS OF LEFT SHOULDER JOINT: Primary | ICD-10-CM

## 2017-04-19 DIAGNOSIS — J45.40 MODERATE PERSISTENT ASTHMA WITHOUT COMPLICATION: ICD-10-CM

## 2017-04-19 DIAGNOSIS — M79.18 MYOFASCIAL PAIN ON LEFT SIDE: ICD-10-CM

## 2017-04-19 DIAGNOSIS — B02.9 HERPES ZOSTER WITHOUT COMPLICATION: ICD-10-CM

## 2017-04-19 DIAGNOSIS — F41.9 ANXIETY: ICD-10-CM

## 2017-04-19 RX ORDER — TRIAMCINOLONE ACETONIDE 40 MG/ML
40 INJECTION, SUSPENSION INTRA-ARTICULAR; INTRAMUSCULAR ONCE
Qty: 1 ML | Refills: 0
Start: 2017-04-19 | End: 2017-04-19

## 2017-04-19 RX ORDER — OXYCODONE AND ACETAMINOPHEN 5; 325 MG/1; MG/1
1 TABLET ORAL
Qty: 12 TAB | Refills: 0 | Status: SHIPPED | OUTPATIENT
Start: 2017-04-19 | End: 2017-06-09 | Stop reason: SDUPTHER

## 2017-04-19 NOTE — PROGRESS NOTES
Edinson Alexandre is a 61 y.o.  male and presents with    Chief Complaint   Patient presents with    Arm Pain    Shingles     Subjective:  Shoulder Pain  Patient complains of left side shoulder pain. The symptoms began about a month ago Course of symptoms since onset has been gradually worsening. Pain is described as overall severity = moderate. Symptoms were incited by no known event. Patient denies hematemesis, melena, fever, alcohol overuse. Therapy to date includes rest, ice and OTC analgesics: ineffective. Asthma Review:  The patient is being seen for follow up of asthma, not currently in exacerbation. Asthma symptoms occur: > 2 times per week. Wheezing when present is described as mild-moderate and easily relieved with rescue bronchodilator. Current limitations in activity from asthma: none. Number of days of school or work missed in the last month: 0. Frequency of use of quick-relief meds: > 2 times per week. Regimen compliance: The patient reports adherence to this regimen. Patient does smoke cigarettes now less than 1/2 pack. Anxiety Review:  Patient is seen for anxiety disorder. Current treatment includes Celexa, Ativan and no other therapies. Ongoing symptoms include: palpitations, dizziness, racing thoughts, psychomotor agitation, feelings of losing control, difficulty concentrating but improved. Patient denies: chest pain, suicidal ideation.      Rash cleared with acyclovir; pain improved      Patient Active Problem List   Diagnosis Code    Asthma J45.909    Tobacco abuse Z72.0    GERD (gastroesophageal reflux disease) K21.9    Chronic pain G89.29    Anxiety F41.9    Bilateral primary osteoarthritis of hip M16.0    Advance care planning Z71.89     Patient Active Problem List    Diagnosis Date Noted    Advance care planning 01/26/2017    Bilateral primary osteoarthritis of hip 07/14/2016    Anxiety 05/03/2016    Chronic pain 03/07/2016    GERD (gastroesophageal reflux disease) 06/11/2014    Asthma 11/12/2013    Tobacco abuse 11/12/2013     Current Outpatient Prescriptions   Medication Sig Dispense Refill    amLODIPine (NORVASC) 5 mg tablet TAKE ONE TABLET BY MOUTH ONCE DAILY 30 Tab 11    LORazepam (ATIVAN) 1 mg tablet Take 1 Tab by mouth every eight (8) hours as needed for Anxiety. Max Daily Amount: 3 mg. 30 Tab 2    oxyCODONE-acetaminophen (PERCOCET) 5-325 mg per tablet Take 1 Tab by mouth every four (4) hours as needed for Pain. Max Daily Amount: 6 Tabs. 20 Tab 0    amitriptyline (ELAVIL) 25 mg tablet Take 1 Tab by mouth nightly. 30 Tab 0    albuterol (PROVENTIL HFA, VENTOLIN HFA, PROAIR HFA) 90 mcg/actuation inhaler Take 2 Puffs by inhalation every six (6) hours as needed for Wheezing or Shortness of Breath. 1 Inhaler 12    citalopram (CELEXA) 10 mg tablet Take 1 Tab by mouth every evening. Indications: GENERALIZED ANXIETY DISORDER 90 Tab 3    celecoxib (CELEBREX) 200 mg capsule Take 1 Cap by mouth two (2) times a day. 180 Cap 3    tiZANidine (ZANAFLEX) 4 mg tablet Take 1 Tab by mouth every six (6) hours as needed. 40 Tab 3    fluticasone (FLOVENT HFA) 44 mcg/actuation inhaler Take 1 Puff by inhalation two (2) times a day. Indications: MAINTENANCE THERAPY FOR ASTHMA 1 Inhaler 2    AMITIZA 24 mcg capsule Take 1 Cap by mouth two (2) times daily (with meals). (Patient taking differently: Take 24 mcg by mouth two (2) times daily (with meals). Indications: CHRONIC IDIOPATHIC CONSTIPATION) 60 Cap 3    losartan (COZAAR) 25 mg tablet Take 1 Tab by mouth daily.  Indications: HYPERTENSION 90 Tab 3     Allergies   Allergen Reactions    Neurontin [Gabapentin] Other (comments)     Upset stomach     Past Medical History:   Diagnosis Date    Anxiety     Arthritis     osteo-hips    Asthma     as a child only    Bilateral primary osteoarthritis of hip 7/14/2016    Broken rib april 2013    3 broken ribs s/p fall with hemothorax    Chronic pain     hips, legs and knees  Hypertension     Psychiatric disorder     anxiety     Past Surgical History:   Procedure Laterality Date    HX HERNIA REPAIR Left     HX LUMBAR DISKECTOMY      HX MOHS PROCEDURES      (R) rotator cuff     Family History   Problem Relation Age of Onset    Dementia Mother     Cancer Father 61     Social History   Substance Use Topics    Smoking status: Current Every Day Smoker     Packs/day: 0.25     Years: 30.00    Smokeless tobacco: Never Used      Comment: pt will try to quit smoking    Alcohol use No      Comment: quit 1/2016       ROS   General ROS: negative for - chills or fever  Psychological ROS: positive for - anxiety  Ophthalmic ROS: positive for - uses glasses  ENT ROS: negative for - headaches  Endocrine ROS: negative for - polydipsia/polyuria or temperature intolerance  Respiratory ROS: positive for - wheezing intermittently  Cardiovascular ROS: negative for - palpitations  Gastrointestinal ROS: no abdominal pain, change in bowel habits, or black or bloody stools  Genito-Urinary ROS: no dysuria, trouble voiding, or hematuria  Musculoskeletal ROS: hips improved; no longer taking celebrex  Neurological ROS: no TIA or stroke symptoms    All other systems reviewed and are negative. Objective:  Vitals:    04/19/17 1436   BP: 139/83   Pulse: 63   Resp: 18   Temp: 97.6 °F (36.4 °C)   TempSrc: Oral   SpO2: 97%   Weight: 172 lb 6.4 oz (78.2 kg)   Height: 5' 7\" (1.702 m)   PainSc:   8   PainLoc: Arm       alert, well appearing, and in no distress, oriented to person, place, and time and overweight  Mental status - normal mood, behavior, speech, dress, motor activity, and thought processes  Chest - clear to auscultation, no wheezes, rales or rhonchi, symmetric air entry  Heart - normal rate, regular rhythm, normal S1, S2, no murmurs, rubs, clicks or gallops  Skin - healed lesions on left side of chest  Shoulder exam:  Right shoulder: No erythema, edema, or bruising.  Nontender to acromioclavicular joint. Nontender to subacromial bursa. No impingement signs. No glenohumeral laxity. Left shoulder: No erythema, edema, or bruising. Nontender to acromioclavicular joint. Tender to subacromial bursa. POS impingment signs No glenohumeral laxity. [facility]  OFFICE PROCEDURE PROGRESS NOTE        Chart reviewed for the following:   Olga Hernandez MD, have reviewed the History, Physical and updated the Allergic reactions for [address] performed immediately prior to start of procedure:   I, Ash Chiu MD, have performed the following reviews on Lakeisha Pena prior to the start of the procedure:            * Patient was identified by name and date of birth   * Agreement on procedure being performed was verified  * Risks and Benefits explained to the patient  * Procedure site verified and marked as necessary  * Patient was positioned for comfort  * Understanding confirmed and consent was signed and verified     Time: .3:00 PM       Date of procedure: 4/19/2017    Procedure performed by:  Ash Chiu MD    Provider assisted by: Mk Cosme LPN    Patient assisted by: self    How tolerated by patient: tolerated the procedure well with no complications    Comments: none    after obtaining informed consent the left shoulder was prepped in sterile fashion; ethyl chloride used for topical anesthesia; 2 cc 1:1:1 marcain 0.5%, lidocaine 1% without epi and kenalog 40 mg/cc injected into shoulder joint using posterior approach; EBL < 1 cc; post procedure pain 0/10    after obtaining informed consent the left trapezius was prepped in sterile fashion; ethyl chloride used for topical anesthesia; 1 cc 1:1:1 marcaine 0.5%, lidocaine 1% without epi and kenalog 40 mg/cc injected into left trapezius; EBL < 1 cc; post procedure pain 0/10      Assessment/Plan:    1.  Subacromial bursitis of left shoulder joint  Immediate relief with injection; percocet prescribed for use while triamcinolone takes effect  - oxyCODONE-acetaminophen (PERCOCET) 5-325 mg per tablet; Take 1 Tab by mouth every four (4) hours as needed for Pain. Max Daily Amount: 6 Tabs. Dispense: 12 Tab; Refill: 0  - DRAIN/INJECT LARGE JOINT/BURSA  - TRIAMCINOLONE ACETONIDE INJ  - triamcinolone acetonide (KENALOG) 40 mg/mL injection; 1 mL by IntraBURSal route once for 1 dose. Dispense: 1 mL; Refill: 0    2. Myofascial pain on left side  Improved with injection  - INJECT TRIGGER POINT, 1 OR 2    3. Herpes zoster without complication  Improved with acyclovir    4. Anxiety  Controlled with current medications    5. Moderate persistent asthma without complication  Continue treatment    Lab review: no lab studies available for review at time of visit      I have discussed the diagnosis with the patient and the intended plan as seen in the above orders. The patient has received an after-visit summary and questions were answered concerning future plans. I have discussed medication side effects and warnings with the patient as well. I have reviewed the plan of care with the patient, accepted their input and they are in agreement with the treatment goals. Follow-up Disposition:  Return if symptoms worsen or fail to improve.

## 2017-04-19 NOTE — MR AVS SNAPSHOT
Visit Information Date & Time Provider Department Dept. Phone Encounter #  
 4/19/2017  2:30 PM Linda Kapadia, [address] [facility] [phone] [id number] Upcoming Health Maintenance Date Due DTaP/Tdap/Td series (1 - Tdap) 3/14/1980 COLONOSCOPY 6/11/2024 Allergies as of 4/19/2017  Review Complete On: 4/19/2017 By: Manjeet Sen LPN Severity Noted Reaction Type Reactions Neurontin [Gabapentin]  06/11/2014    Other (comments) Upset stomach Current Immunizations  Reviewed on 9/6/2016 Name Date Influenza Vaccine (Quad) PF 9/6/2016 11:52 AM, 11/6/2015  2:37 PM  
 Influenza Vaccine PF 11/14/2013  1:31 PM  
 Pneumococcal Polysaccharide (PPSV-23) 2/5/2016 Not reviewed this visit You Were Diagnosed With   
  
 Codes Comments Subacromial bursitis of left shoulder joint    -  Primary ICD-10-CM: M75.52 
ICD-9-CM: 726.19 Myofascial pain on left side     ICD-10-CM: M79.1 ICD-9-CM: 729.1 Herpes zoster without complication     NHP-96-AM: B02.9 ICD-9-CM: 052. 9 Vitals BP Pulse Temp Resp Height(growth percentile) Weight(growth percentile) 139/83 (BP 1 Location: Right arm, BP Patient Position: Sitting) 63 97.6 °F (36.4 °C) (Oral) 18 5' 7\" (1.702 m) 172 lb 6.4 oz (78.2 kg) SpO2 BMI Smoking Status 97% 27 kg/m2 Current Every Day Smoker Vitals History BMI and BSA Data Body Mass Index Body Surface Area  
 27 kg/m 2 1.92 m 2 Preferred Pharmacy Pharmacy Name Phone [facility] [address], [address] [place] [address] [phone] Your Updated Medication List  
  
   
This list is accurate as of: 4/19/17  3:10 PM.  Always use your most recent med list.  
  
  
  
  
 albuterol 90 mcg/actuation inhaler Commonly known as:  PROVENTIL HFA, VENTOLIN HFA, PROAIR HFA Take 2 Puffs by inhalation every six (6) hours as needed for Wheezing or Shortness of Breath. AMITIZA 24 mcg capsule Generic drug:  lubiPROStone Take 1 Cap by mouth two (2) times daily (with meals). amitriptyline 25 mg tablet Commonly known as:  ELAVIL Take 1 Tab by mouth nightly. amLODIPine 5 mg tablet Commonly known as:  [employer]op TAKE ONE TABLET BY MOUTH ONCE DAILY  
  
 celecoxib 200 mg capsule Commonly known as:  CeleBREX Take 1 Cap by mouth two (2) times a day. citalopram 10 mg tablet Commonly known as:  Orbie Curls Take 1 Tab by mouth every evening. Indications: GENERALIZED ANXIETY DISORDER  
  
 fluticasone 44 mcg/actuation inhaler Commonly known as:  FLOVENT HFA Take 1 Puff by inhalation two (2) times a day. Indications: MAINTENANCE THERAPY FOR ASTHMA LORazepam 1 mg tablet Commonly known as:  ATIVAN Take 1 Tab by mouth every eight (8) hours as needed for Anxiety. Max Daily Amount: 3 mg.  
  
 losartan 25 mg tablet Commonly known as:  COZAAR Take 1 Tab by mouth daily. Indications: HYPERTENSION  
  
 oxyCODONE-acetaminophen 5-325 mg per tablet Commonly known as:  PERCOCET Take 1 Tab by mouth every four (4) hours as needed for Pain. Max Daily Amount: 6 Tabs. tiZANidine 4 mg tablet Commonly known as:  [place] Take 1 Tab by mouth every six (6) hours as needed. triamcinolone acetonide 40 mg/mL injection Commonly known as:  KENALOG  
1 mL by IntraBURSal route once for 1 dose. Prescriptions Printed Refills  
 oxyCODONE-acetaminophen (PERCOCET) 5-325 mg per tablet 0 Sig: Take 1 Tab by mouth every four (4) hours as needed for Pain. Max Daily Amount: 6 Tabs. Class: Print Route: Oral  
  
We Performed the Following DRAIN/INJECT LARGE JOINT/BURSA [id number] CPT(R)] INJECT TRIGGER POINT, 1 OR 2 [id number] CPT(R)] TRIAMCINOLONE ACETONIDE INJ [ \[facility] [place] [facility]\""] Patient Instructions Shoulder Bursitis: Exercises Your Care Instructions Here are some examples of typical rehabilitation exercises for your condition. Start each exercise slowly. Ease off the exercise if you start to have pain. Your doctor or physical therapist will tell you when you can start these exercises and which ones will work best for you. How to do the exercises Posterior stretching exercise 1. Hold the elbow of your injured arm with your other hand. 2. Use your hand to pull your injured arm gently up and across your body. You will feel a gentle stretch across the back of your injured shoulder. 3. Hold for at least 15 to 30 seconds. Then slowly lower your arm. 4. Repeat 2 to 4 times. Up-the-back stretch Note: Your doctor or physical therapist may want you to wait to do this stretch until you have regained most of your range of motion and strength. You can do this stretch in different ways. Hold any of these stretches for at least 15 to 30 seconds. Repeat them 2 to 4 times. 1. Put your hand in your back pocket. Let it rest there to stretch your shoulder. 2. With your other hand, hold your injured arm (palm outward) behind your back by the wrist. Pull your arm up gently to stretch your shoulder. 3. Next, put a towel over your other shoulder. Put the hand of your injured arm behind your back. Now hold the back end of the towel. With the other hand, hold the front end of the towel in front of your body. Pull gently on the front end of the towel. This will bring your hand farther up your back to stretch your shoulder. Overhead stretch 1. Standing about an arm's length away, grasp onto a solid surface. You could use a countertop, a doorknob, or the back of a sturdy chair. 2. With your knees slightly bent, bend forward with your arms straight. Lower your upper body, and let your shoulders stretch. 3. As your shoulders are able to stretch farther, you may need to take a step or two backward. 4. Hold for at least 15 to 30 seconds. Then stand up and relax.  If you had stepped back during your stretch, step forward so you can keep your hands on the solid surface. 5. Repeat 2 to 4 times. Shoulder flexion (lying down) Note: To make a wand for this exercise, use a piece of PVC pipe or a broom handle with the broom removed. Make the wand about a foot wider than your shoulders. 1. Lie on your back, holding a wand with both hands. Your palms should face down as you hold the wand. 2. Keeping your elbows straight, slowly raise your arms over your head. Raise them until you feel a stretch in your shoulders, upper back, and chest. 
3. Hold for 15 to 30 seconds. 4. Repeat 2 to 4 times. Shoulder rotation (lying down) Note: To make a wand for this exercise, use a piece of PVC pipe or a broom handle with the broom removed. Make the wand about a foot wider than your shoulders. 1. Lie on your back. Hold a wand with both hands with your elbows bent and palms up. 2. Keep your elbows close to your body, and move the wand across your body toward the sore arm. 3. Hold for 8 to 12 seconds. 4. Repeat 2 to 4 times. Shoulder blade squeeze 1. Stand with your arms at your sides, and squeeze your shoulder blades together. Do not raise your shoulders up as you squeeze. 2. Hold 6 seconds. 3. Repeat 8 to 12 times. Shoulder flexor and extensor exercise Note: These are isometric exercises. That means you contract your muscles without actually moving. · Push forward (flex): Stand facing a wall or doorjamb, about 6 inches or less back. Hold your injured arm against your body. Make a closed fist with your thumb on top. Then gently push your hand forward into the wall with about 25% to 50% of your strength. Don't let your body move backward as you push. Hold for about 6 seconds. Relax for a few seconds. Repeat 8 to 12 times. · Push backward (extend): Stand with your back flat against a wall.  Your upper arm should be against the wall, with your elbow bent 90 degrees (your hand straight ahead). Push your elbow gently back against the wall with about 25% to 50% of your strength. Don't let your body move forward as you push. Hold for about 6 seconds. Relax for a few seconds. Repeat 8 to 12 times. Scapular exercise: Wall push-ups Note: This exercise is best done with your fingers somewhat turned out, rather than straight up and down. 1. Stand facing a wall, about 12 inches to 18 inches away. 2. Place your hands on the wall at shoulder height. 3. Slowly bend your elbows and bring your face to the wall. Keep your back and hips straight. 4. Push back to where you started. 5. Repeat 8 to 12 times. 6. When you can do this exercise against a wall comfortably, you can try it against a counter. You can then slowly progress to the end of a couch, then to a sturdy chair, and finally to the floor. Scapular exercise: Retraction Note: For this exercise, you will need elastic exercise material, such as surgical tubing or Thera-Band. 1. Put the band around a solid object at about waist level. (A bedpost will work well.) Each hand should hold an end of the band. 2. With your elbows at your sides and bent to 90 degrees, pull the band back. Your shoulder blades should move toward each other. Then move your arms back where you started. 3. Repeat 8 to 12 times. 4. If you have good range of motion in your shoulders, try this exercise with your arms lifted out to the sides. Keep your elbows at a 90-degree angle. Raise the elastic band up to about shoulder level. Pull the band back to move your shoulder blades toward each other. Then move your arms back where you started. Internal rotator strengthening exercise 1. Start by tying a piece of elastic exercise material to a doorknob. You can use surgical tubing or Thera-Band. 2. Stand or sit with your shoulder relaxed and your elbow bent 90 degrees. Your upper arm should rest comfortably against your side.  Squeeze a rolled towel between your elbow and your body for comfort. This will help keep your arm at your side. 3. Hold one end of the elastic band in the hand of the painful arm. 4. Slowly rotate your forearm toward your body until it touches your belly. Slowly move it back to where you started. 5. Keep your elbow and upper arm firmly tucked against the towel roll or at your side. 6. Repeat 8 to 12 times. External rotator strengthening exercise 1. Start by tying a piece of elastic exercise material to a doorknob. You can use surgical tubing or Thera-Band. (You may also hold one end of the band in each hand.) 2. Stand or sit with your shoulder relaxed and your elbow bent 90 degrees. Your upper arm should rest comfortably against your side. Squeeze a rolled towel between your elbow and your body for comfort. This will help keep your arm at your side. 3. Hold one end of the elastic band with the hand of the painful arm. 4. Start with your forearm across your belly. Slowly rotate the forearm out away from your body. Keep your elbow and upper arm tucked against the towel roll or the side of your body until you begin to feel tightness in your shoulder. Slowly move your arm back to where you started. 5. Repeat 8 to 12 times. Follow-up care is a key part of your treatment and safety. Be sure to make and go to all appointments, and call your doctor if you are having problems. It's also a good idea to know your test results and keep a list of the medicines you take. Where can you learn more? Go to http://carolina-misbah.info/. Enter Y311 in the search box to learn more about \"Shoulder Bursitis: Exercises. \" Current as of: May 23, 2016 Content Version: 11.2 © 3757-8180 E[employer]ka King, [employer]. Care instructions adapted under license by [employer] (which disclaims liability or warranty for this information).  If you have questions about a medical condition or this instruction, always ask your healthcare professional. [address] any warranty or liability for your use of this information. Introducing [facility]! Dear Basil Wu: Thank you for requesting a Space [employer]key account. Our records indicate that you have previously registered for a [employer] Monkey account but its currently inactive. Please call our Space Monkey support line at [phone]. Additional Information If you have questions, please visit the Frequently Asked Questions section of the Space Monkey website at [email or web address] Remember, Space Monkey is NOT to be used for urgent needs. For medical emergencies, dial 911. Now available from your iPhone and Android! Please provide this summary of care documentation to your next provider. Your primary care clinician is listed as Adrien Simon. If you have any questions after today's visit, please call [phone].

## 2017-04-19 NOTE — PATIENT INSTRUCTIONS
Shoulder Bursitis: Exercises  Your Care Instructions  Here are some examples of typical rehabilitation exercises for your condition. Start each exercise slowly. Ease off the exercise if you start to have pain. Your doctor or physical therapist will tell you when you can start these exercises and which ones will work best for you. How to do the exercises  Posterior stretching exercise    1. Hold the elbow of your injured arm with your other hand. 2. Use your hand to pull your injured arm gently up and across your body. You will feel a gentle stretch across the back of your injured shoulder. 3. Hold for at least 15 to 30 seconds. Then slowly lower your arm. 4. Repeat 2 to 4 times. Up-the-back stretch    Note: Your doctor or physical therapist may want you to wait to do this stretch until you have regained most of your range of motion and strength. You can do this stretch in different ways. Hold any of these stretches for at least 15 to 30 seconds. Repeat them 2 to 4 times. 1. Put your hand in your back pocket. Let it rest there to stretch your shoulder. 2. With your other hand, hold your injured arm (palm outward) behind your back by the wrist. Pull your arm up gently to stretch your shoulder. 3. Next, put a towel over your other shoulder. Put the hand of your injured arm behind your back. Now hold the back end of the towel. With the other hand, hold the front end of the towel in front of your body. Pull gently on the front end of the towel. This will bring your hand farther up your back to stretch your shoulder. Overhead stretch    1. Standing about an arm's length away, grasp onto a solid surface. You could use a countertop, a doorknob, or the back of a sturdy chair. 2. With your knees slightly bent, bend forward with your arms straight. Lower your upper body, and let your shoulders stretch. 3. As your shoulders are able to stretch farther, you may need to take a step or two backward.   4. Hold for at least 15 to 30 seconds. Then stand up and relax. If you had stepped back during your stretch, step forward so you can keep your hands on the solid surface. 5. Repeat 2 to 4 times. Shoulder flexion (lying down)    Note: To make a wand for this exercise, use a piece of PVC pipe or a broom handle with the broom removed. Make the wand about a foot wider than your shoulders. 1. Lie on your back, holding a wand with both hands. Your palms should face down as you hold the wand. 2. Keeping your elbows straight, slowly raise your arms over your head. Raise them until you feel a stretch in your shoulders, upper back, and chest.  3. Hold for 15 to 30 seconds. 4. Repeat 2 to 4 times. Shoulder rotation (lying down)    Note: To make a wand for this exercise, use a piece of PVC pipe or a broom handle with the broom removed. Make the wand about a foot wider than your shoulders. 1. Lie on your back. Hold a wand with both hands with your elbows bent and palms up. 2. Keep your elbows close to your body, and move the wand across your body toward the sore arm. 3. Hold for 8 to 12 seconds. 4. Repeat 2 to 4 times. Shoulder blade squeeze    1. Stand with your arms at your sides, and squeeze your shoulder blades together. Do not raise your shoulders up as you squeeze. 2. Hold 6 seconds. 3. Repeat 8 to 12 times. Shoulder flexor and extensor exercise    Note: These are isometric exercises. That means you contract your muscles without actually moving. · Push forward (flex): Stand facing a wall or doorjamb, about 6 inches or less back. Hold your injured arm against your body. Make a closed fist with your thumb on top. Then gently push your hand forward into the wall with about 25% to 50% of your strength. Don't let your body move backward as you push. Hold for about 6 seconds. Relax for a few seconds. Repeat 8 to 12 times. · Push backward (extend): Stand with your back flat against a wall.  Your upper arm should be against the wall, with your elbow bent 90 degrees (your hand straight ahead). Push your elbow gently back against the wall with about 25% to 50% of your strength. Don't let your body move forward as you push. Hold for about 6 seconds. Relax for a few seconds. Repeat 8 to 12 times. Scapular exercise: Wall push-ups    Note: This exercise is best done with your fingers somewhat turned out, rather than straight up and down. 1. Stand facing a wall, about 12 inches to 18 inches away. 2. Place your hands on the wall at shoulder height. 3. Slowly bend your elbows and bring your face to the wall. Keep your back and hips straight. 4. Push back to where you started. 5. Repeat 8 to 12 times. 6. When you can do this exercise against a wall comfortably, you can try it against a counter. You can then slowly progress to the end of a couch, then to a sturdy chair, and finally to the floor. Scapular exercise: Retraction    Note: For this exercise, you will need elastic exercise material, such as surgical tubing or Thera-Band. 1. Put the band around a solid object at about waist level. (A bedpost will work well.) Each hand should hold an end of the band. 2. With your elbows at your sides and bent to 90 degrees, pull the band back. Your shoulder blades should move toward each other. Then move your arms back where you started. 3. Repeat 8 to 12 times. 4. If you have good range of motion in your shoulders, try this exercise with your arms lifted out to the sides. Keep your elbows at a 90-degree angle. Raise the elastic band up to about shoulder level. Pull the band back to move your shoulder blades toward each other. Then move your arms back where you started. Internal rotator strengthening exercise    1. Start by tying a piece of elastic exercise material to a doorknob. You can use surgical tubing or Thera-Band. 2. Stand or sit with your shoulder relaxed and your elbow bent 90 degrees.  Your upper arm should rest comfortably against your side. Squeeze a rolled towel between your elbow and your body for comfort. This will help keep your arm at your side. 3. Hold one end of the elastic band in the hand of the painful arm. 4. Slowly rotate your forearm toward your body until it touches your belly. Slowly move it back to where you started. 5. Keep your elbow and upper arm firmly tucked against the towel roll or at your side. 6. Repeat 8 to 12 times. External rotator strengthening exercise    1. Start by tying a piece of elastic exercise material to a doorknob. You can use surgical tubing or Thera-Band. (You may also hold one end of the band in each hand.)  2. Stand or sit with your shoulder relaxed and your elbow bent 90 degrees. Your upper arm should rest comfortably against your side. Squeeze a rolled towel between your elbow and your body for comfort. This will help keep your arm at your side. 3. Hold one end of the elastic band with the hand of the painful arm. 4. Start with your forearm across your belly. Slowly rotate the forearm out away from your body. Keep your elbow and upper arm tucked against the towel roll or the side of your body until you begin to feel tightness in your shoulder. Slowly move your arm back to where you started. 5. Repeat 8 to 12 times. Follow-up care is a key part of your treatment and safety. Be sure to make and go to all appointments, and call your doctor if you are having problems. It's also a good idea to know your test results and keep a list of the medicines you take. Where can you learn more? Go to http://carolina-misbah.info/. Enter K261 in the search box to learn more about \"Shoulder Bursitis: Exercises. \"  Current as of: May 23, 2016  Content Version: 11.2  © 1286-8370 [employer]. Care instructions adapted under license by [employer] (which disclaims liability or warranty for this information).  If you have questions about a medical condition or this instruction, always ask your healthcare professional. [place] [address] any warranty or liability for your use of this information.

## 2017-06-09 ENCOUNTER — OFFICE VISIT (OUTPATIENT)
Dept: FAMILY MEDICINE CLINIC | Age: 57
End: 2017-06-09

## 2017-06-09 VITALS
WEIGHT: 172.8 LBS | DIASTOLIC BLOOD PRESSURE: 84 MMHG | HEIGHT: 67 IN | OXYGEN SATURATION: 99 % | RESPIRATION RATE: 20 BRPM | HEART RATE: 68 BPM | TEMPERATURE: 96.7 F | BODY MASS INDEX: 27.12 KG/M2 | SYSTOLIC BLOOD PRESSURE: 145 MMHG

## 2017-06-09 DIAGNOSIS — J45.40 MODERATE PERSISTENT ASTHMA WITHOUT COMPLICATION: ICD-10-CM

## 2017-06-09 DIAGNOSIS — L23.7 CONTACT DERMATITIS DUE TO POISON IVY: Primary | ICD-10-CM

## 2017-06-09 DIAGNOSIS — M54.2 CERVICAL PAIN: ICD-10-CM

## 2017-06-09 DIAGNOSIS — F41.9 ANXIETY: ICD-10-CM

## 2017-06-09 RX ORDER — OXYCODONE AND ACETAMINOPHEN 5; 325 MG/1; MG/1
1 TABLET ORAL
Qty: 12 TAB | Refills: 0 | Status: SHIPPED | OUTPATIENT
Start: 2017-06-09 | End: 2017-09-29 | Stop reason: ALTCHOICE

## 2017-06-09 RX ORDER — PREDNISONE 20 MG/1
60 TABLET ORAL
Qty: 21 TAB | Refills: 0 | Status: SHIPPED | OUTPATIENT
Start: 2017-06-09 | End: 2017-08-22 | Stop reason: ALTCHOICE

## 2017-06-09 NOTE — PATIENT INSTRUCTIONS
Neck Pain: Care Instructions  Your Care Instructions  You can have neck pain anywhere from the bottom of your head to the top of your shoulders. It can spread to the upper back or arms. Injuries, painting a ceiling, sleeping with your neck twisted, staying in one position for too long, and many other activities can cause neck pain. Most neck pain gets better with home care. Your doctor may recommend medicine to relieve pain or relax your muscles. He or she may suggest exercise and physical therapy to increase flexibility and relieve stress. You may need to wear a special (cervical) collar to support your neck for a day or two. Follow-up care is a key part of your treatment and safety. Be sure to make and go to all appointments, and call your doctor if you are having problems. It's also a good idea to know your test results and keep a list of the medicines you take. How can you care for yourself at home? · Try using a heating pad on a low or medium setting for 15 to 20 minutes every 2 or 3 hours. Try a warm shower in place of one session with the heating pad. · You can also try an ice pack for 10 to 15 minutes every 2 to 3 hours. Put a thin cloth between the ice and your skin. · Take pain medicines exactly as directed. ¨ If the doctor gave you a prescription medicine for pain, take it as prescribed. ¨ If you are not taking a prescription pain medicine, ask your doctor if you can take an over-the-counter medicine. · If your doctor recommends a cervical collar, wear it exactly as directed. When should you call for help? Call your doctor now or seek immediate medical care if:  · You have new or worsening numbness in your arms, buttocks or legs. · You have new or worsening weakness in your arms or legs. (This could make it hard to stand up.)  · You lose control of your bladder or bowels.   Watch closely for changes in your health, and be sure to contact your doctor if:  · Your neck pain is getting worse.  · You are not getting better after 1 week. · You do not get better as expected. Where can you learn more? Go to http://carolina-misbah.info/. Enter [id number] in the search box to learn more about \"Neck Pain: Care Instructions. \"  Current as of: May 23, 2016  Content Version: 11.2  © 7195-1012 [employer]. Care instructions adapted under license by [employer] (which disclaims liability or warranty for this information). If you have questions about a medical condition or this instruction, always ask your healthcare professional. [place] [address] any warranty or liability for your use of this information.

## 2017-06-09 NOTE — MR AVS SNAPSHOT
Visit Information Date & Time Provider Department Dept. Phone Encounter #  
 6/9/2017 10:30 AM Sundar CharltonEloina 6 [phone] [id number] Follow-up Instructions Return if symptoms worsen or fail to improve. Upcoming Health Maintenance Date Due DTaP/Tdap/Td series (1 - Tdap) 3/14/1980 INFLUENZA AGE 9 TO ADULT 8/1/2017 COLONOSCOPY 6/11/2024 Allergies as of 6/9/2017  Review Complete On: 6/9/2017 By: Sundar Charlton MD  
  
 Severity Noted Reaction Type Reactions Neurontin [Gabapentin]  06/11/2014    Other (comments) Upset stomach Current Immunizations  Reviewed on 9/6/2016 Name Date Influenza Vaccine (Quad) PF 9/6/2016 11:52 AM, 11/6/2015  2:37 PM  
 Influenza Vaccine PF 11/14/2013  1:31 PM  
 Pneumococcal Polysaccharide (PPSV-23) 2/5/2016 Not reviewed this visit You Were Diagnosed With   
  
 Codes Comments Contact dermatitis due to poison ivy    -  Primary ICD-10-CM: L23.7 ICD-9-CM: 692.6 Cervical pain     ICD-10-CM: M54.2 ICD-9-CM: 723.1 Vitals BP Pulse Temp Resp Height(growth percentile) Weight(growth percentile) 145/84 68 96.7 °F (35.9 °C) (Oral) 20 5' 7\" (1.702 m) 172 lb 12.8 oz (78.4 kg) SpO2 BMI Smoking Status 99% 27.06 kg/m2 Current Every Day Smoker BMI and BSA Data Body Mass Index Body Surface Area  
 27.06 kg/m 2 1.93 m 2 Preferred Pharmacy Pharmacy Name Phone [facility] [address], [address] [phone] Your Updated Medication List  
  
   
This list is accurate as of: 6/9/17 11:05 AM.  Always use your most recent med list.  
  
  
  
  
 albuterol 90 mcg/actuation inhaler Commonly known as:  PROVENTIL HFA, VENTOLIN HFA, PROAIR HFA Take 2 Puffs by inhalation every six (6) hours as needed for Wheezing or Shortness of Breath. AMITIZA 24 mcg capsule Generic drug:  lubiPROStone Take 1 Cap by mouth two (2) times daily (with meals). amitriptyline 25 mg tablet Commonly known as:  ELAVIL Take 1 Tab by mouth nightly. amLODIPine 5 mg tablet Commonly known as:  Jolie Punter TAKE ONE TABLET BY MOUTH ONCE DAILY  
  
 celecoxib 200 mg capsule Commonly known as:  CeleBREX Take 1 Cap by mouth two (2) times a day. citalopram 10 mg tablet Commonly known as:  Unger Anglin Take 1 Tab by mouth every evening. Indications: GENERALIZED ANXIETY DISORDER  
  
 fluticasone 44 mcg/actuation inhaler Commonly known as:  FLOVENT HFA Take 1 Puff by inhalation two (2) times a day. Indications: MAINTENANCE THERAPY FOR ASTHMA LORazepam 1 mg tablet Commonly known as:  ATIVAN Take 1 Tab by mouth every eight (8) hours as needed for Anxiety. Max Daily Amount: 3 mg.  
  
 losartan 25 mg tablet Commonly known as:  COZAAR Take 1 Tab by mouth daily. Indications: HYPERTENSION  
  
 oxyCODONE-acetaminophen 5-325 mg per tablet Commonly known as:  PERCOCET Take 1 Tab by mouth every four (4) hours as needed for Pain. Max Daily Amount: 6 Tabs. predniSONE 20 mg tablet Commonly known as:  Fawn Natalie Take 3 Tabs by mouth daily (with breakfast). tiZANidine 4 mg tablet Commonly known as:  Danielle Baez Take 1 Tab by mouth every six (6) hours as needed. Prescriptions Printed Refills  
 oxyCODONE-acetaminophen (PERCOCET) 5-325 mg per tablet 0 Sig: Take 1 Tab by mouth every four (4) hours as needed for Pain. Max Daily Amount: 6 Tabs. Class: Print Route: Oral  
  
Prescriptions Sent to Pharmacy Refills  
 predniSONE (DELTASONE) 20 mg tablet 0 Sig: Take 3 Tabs by mouth daily (with breakfast). Class: Normal  
 Pharmacy: [address], [address] Ph #: [phone] Route: Oral  
  
Follow-up Instructions Return if symptoms worsen or fail to improve. Patient Instructions Neck Pain: Care Instructions Your Care Instructions You can have neck pain anywhere from the bottom of your head to the top of your shoulders. It can spread to the upper back or arms. Injuries, painting a ceiling, sleeping with your neck twisted, staying in one position for too long, and many other activities can cause neck pain. Most neck pain gets better with home care. Your doctor may recommend medicine to relieve pain or relax your muscles. He or she may suggest exercise and physical therapy to increase flexibility and relieve stress. You may need to wear a special (cervical) collar to support your neck for a day or two. Follow-up care is a key part of your treatment and safety. Be sure to make and go to all appointments, and call your doctor if you are having problems. It's also a good idea to know your test results and keep a list of the medicines you take. How can you care for yourself at home? · Try using a heating pad on a low or medium setting for 15 to 20 minutes every 2 or 3 hours. Try a warm shower in place of one session with the heating pad. · You can also try an ice pack for 10 to 15 minutes every 2 to 3 hours. Put a thin cloth between the ice and your skin. · Take pain medicines exactly as directed. ¨ If the doctor gave you a prescription medicine for pain, take it as prescribed. ¨ If you are not taking a prescription pain medicine, ask your doctor if you can take an over-the-counter medicine. · If your doctor recommends a cervical collar, wear it exactly as directed. When should you call for help? Call your doctor now or seek immediate medical care if: 
· You have new or worsening numbness in your arms, buttocks or legs. · You have new or worsening weakness in your arms or legs. (This could make it hard to stand up.) · You lose control of your bladder or bowels. Watch closely for changes in your health, and be sure to contact your doctor if: 
· Your neck pain is getting worse. · You are not getting better after 1 week. · You do not get better as expected. Where can you learn more? Go to http://carolina-misbah.info/. Enter [id number] in the search box to learn more about \"Neck Pain: Care Instructions. \" Current as of: May 23, 2016 Content Version: 11.2 © 2133-9082 [employer]. Care instructions adapted under license by [employer] (which disclaims liability or warranty for this information). If you have questions about a medical condition or this instruction, always ask your healthcare professional. [address] any warranty or liability for your use of this information. Introducing [facility]! Dear Latia Mins: Thank you for requesting a [employer] account. Our records indicate that you have previously registered for a [employer] account but its currently inactive. Please call our [employer] support line at [phone]. Additional Information If you have questions, please visit the Frequently Asked Questions section of the [employer] website at [email or web address] Remember, [employer] is NOT to be used for urgent needs. For medical emergencies, dial 911. Now available from your iPhone and Android! Please provide this summary of care documentation to your next provider. Your primary care clinician is listed as Glenn Owens. If you have any questions after today's visit, please call [phone].

## 2017-06-09 NOTE — PROGRESS NOTES
Karen Austin is a 61 y.o.  male and presents with    Chief Complaint   Patient presents with    Poison Ivy/Poison Oak/Poison Sumac Exposure    Neck Pain     Subjective:  Rash  Patient complains of rash involving the armsbilateral, neck. Rash started 1 week ago. Appearance of rash at onset: Color of lesion(s): pink. Rash has changed over time Initial distribution: armsbilateral.  Discomfort associated with rash: pruritic. Associated symptoms: no associated symptoms. Denies: fever, cough, congestion, sore throat, headache, abdominal pain, nausea, vomiting. Patient has not had previous evaluation of rash. Patient has not had previous treatment. Response to treatment: otc ineffective. Patient has not had contacts with similar rash. Patient has identified precipitant. Patient has had new exposures (soaps, lotions, laundry detergents, foods, medications, plants, insects or animals.)  He c/o neck stiffness which started yesterday; he used a percocet with some improvement. He reports intermittent paresthesias  Anxiety Review:  Patient is seen for anxiety disorder. Current treatment includes Celexa, Ativan and no other therapies. Ongoing symptoms include: palpitations, dizziness, racing thoughts, psychomotor agitation, feelings of losing control, difficulty concentrating but improved. Patient denies: chest pain, suicidal ideation. Reported side effects from the treatment: none.         Patient Active Problem List   Diagnosis Code    Asthma J45.909    Tobacco abuse Z72.0    GERD (gastroesophageal reflux disease) K21.9    Chronic pain G89.29    Anxiety F41.9    Bilateral primary osteoarthritis of hip M16.0    Advance care planning Z71.89     Patient Active Problem List    Diagnosis Date Noted    Advance care planning 01/26/2017    Bilateral primary osteoarthritis of hip 07/14/2016    Anxiety 05/03/2016    Chronic pain 03/07/2016    GERD (gastroesophageal reflux disease) 06/11/2014    Asthma 11/12/2013    Tobacco abuse 11/12/2013     Current Outpatient Prescriptions   Medication Sig Dispense Refill    amLODIPine (NORVASC) 5 mg tablet TAKE ONE TABLET BY MOUTH ONCE DAILY 30 Tab 11    LORazepam (ATIVAN) 1 mg tablet Take 1 Tab by mouth every eight (8) hours as needed for Anxiety. Max Daily Amount: 3 mg. 30 Tab 2    albuterol (PROVENTIL HFA, VENTOLIN HFA, PROAIR HFA) 90 mcg/actuation inhaler Take 2 Puffs by inhalation every six (6) hours as needed for Wheezing or Shortness of Breath. 1 Inhaler 12    celecoxib (CELEBREX) 200 mg capsule Take 1 Cap by mouth two (2) times a day. 180 Cap 3    fluticasone (FLOVENT HFA) 44 mcg/actuation inhaler Take 1 Puff by inhalation two (2) times a day. Indications: MAINTENANCE THERAPY FOR ASTHMA 1 Inhaler 2    losartan (COZAAR) 25 mg tablet Take 1 Tab by mouth daily. Indications: HYPERTENSION 90 Tab 3    oxyCODONE-acetaminophen (PERCOCET) 5-325 mg per tablet Take 1 Tab by mouth every four (4) hours as needed for Pain. Max Daily Amount: 6 Tabs. 12 Tab 0    amitriptyline (ELAVIL) 25 mg tablet Take 1 Tab by mouth nightly. 30 Tab 0    citalopram (CELEXA) 10 mg tablet Take 1 Tab by mouth every evening. Indications: GENERALIZED ANXIETY DISORDER 90 Tab 3    tiZANidine (ZANAFLEX) 4 mg tablet Take 1 Tab by mouth every six (6) hours as needed. 40 Tab 3    AMITIZA 24 mcg capsule Take 1 Cap by mouth two (2) times daily (with meals). (Patient taking differently: Take 24 mcg by mouth two (2) times daily (with meals).  Indications: CHRONIC IDIOPATHIC CONSTIPATION) 60 Cap 3     Allergies   Allergen Reactions    Neurontin [Gabapentin] Other (comments)     Upset stomach     Past Medical History:   Diagnosis Date    Anxiety     Arthritis     osteo-hips    Asthma     as a child only    Bilateral primary osteoarthritis of hip 7/14/2016    Broken rib april 2013    3 broken ribs s/p fall with hemothorax    Chronic pain     hips, legs and knees    Hypertension     Psychiatric disorder     anxiety     Past Surgical History:   Procedure Laterality Date    HX HERNIA REPAIR Left     HX LUMBAR DISKECTOMY      HX MOHS PROCEDURES      (R) rotator cuff     Family History   Problem Relation Age of Onset    Dementia Mother     Cancer Father 61     Social History   Substance Use Topics    Smoking status: Current Every Day Smoker     Packs/day: 0.25     Years: 30.00    Smokeless tobacco: Never Used      Comment: pt will try to quit smoking    Alcohol use No      Comment: quit 1/2016       ROS   General ROS: negative for - chills or fever  Psychological ROS: negative for - depression  Ophthalmic ROS: positive for - uses glasses  ENT ROS: negative for - headaches  Respiratory ROS: positive for - wheezing associated with asthma with symptoms twice a week  Cardiovascular ROS: negative for - chest pain  Gastrointestinal ROS: no abdominal pain, change in bowel habits, or black or bloody stools  Genito-Urinary ROS: no dysuria, trouble voiding, or hematuria  Neurological ROS: no TIA or stroke symptoms    All other systems reviewed and are negative. Objective:  Vitals:    06/09/17 1050   BP: 145/84   Pulse: 68   Resp: 20   Temp: 96.7 °F (35.9 °C)   TempSrc: Oral   SpO2: 99%   Weight: 172 lb 12.8 oz (78.4 kg)   Height: 5' 7\" (1.702 m)   PainSc:   8   PainLoc: Neck       alert, well appearing, and in no distress, oriented to person, place, and time and overweight  Mental status - alert, oriented to person, place, and time, anxious  Neck - pain with motion  Skin - dermatitis on arms and chest    Assessment/Plan:    1. Contact dermatitis due to poison ivy  Keep dry; topical corticosteroid and calamine lotion  - predniSONE (DELTASONE) 20 mg tablet; Take 3 Tabs by mouth daily (with breakfast). Dispense: 21 Tab; Refill: 0    2. Cervical pain  Pain management; stretching exercises  - predniSONE (DELTASONE) 20 mg tablet; Take 3 Tabs by mouth daily (with breakfast). Dispense: 21 Tab;  Refill: 0  - oxyCODONE-acetaminophen (PERCOCET) 5-325 mg per tablet; Take 1 Tab by mouth every four (4) hours as needed for Pain. Max Daily Amount: 6 Tabs. Dispense: 12 Tab; Refill: 0      Lab review: no lab studies available for review at time of visit      I have discussed the diagnosis with the patient and the intended plan as seen in the above orders. The patient has received an after-visit summary and questions were answered concerning future plans. I have discussed medication side effects and warnings with the patient as well. I have reviewed the plan of care with the patient, accepted their input and they are in agreement with the treatment goals. Follow-up Disposition:  Return if symptoms worsen or fail to improve.

## 2017-08-22 ENCOUNTER — OFFICE VISIT (OUTPATIENT)
Dept: FAMILY MEDICINE CLINIC | Age: 57
End: 2017-08-22

## 2017-08-22 VITALS
BODY MASS INDEX: 27.28 KG/M2 | WEIGHT: 173.8 LBS | HEIGHT: 67 IN | OXYGEN SATURATION: 98 % | HEART RATE: 62 BPM | DIASTOLIC BLOOD PRESSURE: 74 MMHG | RESPIRATION RATE: 17 BRPM | SYSTOLIC BLOOD PRESSURE: 161 MMHG

## 2017-08-22 DIAGNOSIS — J45.40 MODERATE PERSISTENT ASTHMA WITHOUT COMPLICATION: ICD-10-CM

## 2017-08-22 DIAGNOSIS — R09.89 CHEST CONGESTION: Primary | ICD-10-CM

## 2017-08-22 DIAGNOSIS — Z98.890 H/O MELANOMA EXCISION: ICD-10-CM

## 2017-08-22 DIAGNOSIS — Z85.820 H/O MELANOMA EXCISION: ICD-10-CM

## 2017-08-22 DIAGNOSIS — F17.210 CIGARETTE NICOTINE DEPENDENCE WITHOUT COMPLICATION: ICD-10-CM

## 2017-08-22 DIAGNOSIS — I10 ESSENTIAL HYPERTENSION: ICD-10-CM

## 2017-08-22 DIAGNOSIS — M54.2 CERVICAL PAIN: ICD-10-CM

## 2017-08-22 RX ORDER — GUAIFENESIN 600 MG/1
600 TABLET, EXTENDED RELEASE ORAL 2 TIMES DAILY
Qty: 20 TAB | Refills: 0 | Status: SHIPPED | OUTPATIENT
Start: 2017-08-22 | End: 2017-09-29 | Stop reason: ALTCHOICE

## 2017-08-22 RX ORDER — FLUTICASONE PROPIONATE 44 UG/1
1 AEROSOL, METERED RESPIRATORY (INHALATION) 2 TIMES DAILY
Qty: 1 INHALER | Refills: 2 | Status: ON HOLD | OUTPATIENT
Start: 2017-08-22 | End: 2018-11-20

## 2017-08-22 RX ORDER — AMLODIPINE BESYLATE 5 MG/1
TABLET ORAL
Qty: 30 TAB | Refills: 11 | Status: SHIPPED | OUTPATIENT
Start: 2017-08-22 | End: 2018-09-07 | Stop reason: SDUPTHER

## 2017-08-22 RX ORDER — ALBUTEROL SULFATE 90 UG/1
2 AEROSOL, METERED RESPIRATORY (INHALATION)
Qty: 1 INHALER | Refills: 12 | Status: SHIPPED | OUTPATIENT
Start: 2017-08-22 | End: 2018-10-08 | Stop reason: SDUPTHER

## 2017-08-22 RX ORDER — PREDNISONE 20 MG/1
60 TABLET ORAL
Qty: 21 TAB | Refills: 0 | Status: SHIPPED | OUTPATIENT
Start: 2017-08-22 | End: 2017-09-29 | Stop reason: ALTCHOICE

## 2017-08-22 RX ORDER — FLUOROURACIL 50 MG/G
CREAM TOPICAL 2 TIMES DAILY
Qty: 40 G | Refills: 0 | Status: ON HOLD | OUTPATIENT
Start: 2017-08-22 | End: 2018-11-20

## 2017-08-22 NOTE — PROGRESS NOTES
Lotus Forrest is a 61 y.o.  male and presents with    Chief Complaint   Patient presents with    Neck Pain    Chest Congestion     Subjective:  Upper Respiratory Infection  Patient complains of chest congestion. Symptoms include no other symptoms. Onset of symptoms was 3 weeks ago, unchanged since that time. He also c/o neck pain for the past several  months . He is drinking plenty of fluids. . Evaluation to date: none. Treatment to date: none. Neck Pain  Patient complains of neck pain. Onset of symptoms was several months ago, unchanged since that time. Current symptoms are pain in bilateral (squeezing in character; 5/10 in severity). Patient denies numbness, tingling, paresthesias in upper extremities. Patient denies weakness, diminished  strength, lack of coordination. Radiation of pain:  Event that precipitate these symptoms: none known. Patient has had recurrent self limited episodes of neck pain in the past.  Previous treatments include: prednisone for rash improved the pain. He has hypertension but did not take his medication today. Patient Active Problem List   Diagnosis Code    Asthma J45.909    Tobacco abuse Z72.0    GERD (gastroesophageal reflux disease) K21.9    Chronic pain G89.29    Anxiety F41.9    Bilateral primary osteoarthritis of hip M16.0    Advance care planning Z71.89     Patient Active Problem List    Diagnosis Date Noted    Advance care planning 01/26/2017    Bilateral primary osteoarthritis of hip 07/14/2016    Anxiety 05/03/2016    Chronic pain 03/07/2016    GERD (gastroesophageal reflux disease) 06/11/2014    Asthma 11/12/2013    Tobacco abuse 11/12/2013     Current Outpatient Prescriptions   Medication Sig Dispense Refill    amLODIPine (NORVASC) 5 mg tablet TAKE ONE TABLET BY MOUTH ONCE DAILY 30 Tab 11    LORazepam (ATIVAN) 1 mg tablet Take 1 Tab by mouth every eight (8) hours as needed for Anxiety.  Max Daily Amount: 3 mg. 30 Tab 2    amitriptyline (ELAVIL) 25 mg tablet Take 1 Tab by mouth nightly. 30 Tab 0    albuterol (PROVENTIL HFA, VENTOLIN HFA, PROAIR HFA) 90 mcg/actuation inhaler Take 2 Puffs by inhalation every six (6) hours as needed for Wheezing or Shortness of Breath. 1 Inhaler 12    citalopram (CELEXA) 10 mg tablet Take 1 Tab by mouth every evening. Indications: GENERALIZED ANXIETY DISORDER 90 Tab 3    celecoxib (CELEBREX) 200 mg capsule Take 1 Cap by mouth two (2) times a day. 180 Cap 3    tiZANidine (ZANAFLEX) 4 mg tablet Take 1 Tab by mouth every six (6) hours as needed. 40 Tab 3    fluticasone (FLOVENT HFA) 44 mcg/actuation inhaler Take 1 Puff by inhalation two (2) times a day. Indications: MAINTENANCE THERAPY FOR ASTHMA 1 Inhaler 2    AMITIZA 24 mcg capsule Take 1 Cap by mouth two (2) times daily (with meals). (Patient taking differently: Take 24 mcg by mouth two (2) times daily (with meals). Indications: CHRONIC IDIOPATHIC CONSTIPATION) 60 Cap 3    losartan (COZAAR) 25 mg tablet Take 1 Tab by mouth daily. Indications: HYPERTENSION 90 Tab 3    oxyCODONE-acetaminophen (PERCOCET) 5-325 mg per tablet Take 1 Tab by mouth every four (4) hours as needed for Pain. Max Daily Amount: 6 Tabs.  12 Tab 0     Allergies   Allergen Reactions    Neurontin [Gabapentin] Other (comments)     Upset stomach     Past Medical History:   Diagnosis Date    Anxiety     Arthritis     osteo-hips    Asthma     as a child only    Bilateral primary osteoarthritis of hip 7/14/2016    Broken rib april 2013    3 broken ribs s/p fall with hemothorax    Chronic pain     hips, legs and knees    Hypertension     Psychiatric disorder     anxiety     Past Surgical History:   Procedure Laterality Date    HX HERNIA REPAIR Left     HX LUMBAR DISKECTOMY      HX MOHS PROCEDURES      (R) rotator cuff     Family History   Problem Relation Age of Onset    Dementia Mother     Cancer Father 61     Social History   Substance Use Topics    Smoking status: Current Every Day Smoker     Packs/day: 0.25     Years: 30.00    Smokeless tobacco: Never Used      Comment: pt will try to quit smoking    Alcohol use No      Comment: quit 1/2016       ROS   General ROS: negative for - chills or fever  Psychological ROS: negative for - depression  Ophthalmic ROS: positive for - uses glasses  Endocrine ROS: negative for - polydipsia/polyuria or temperature intolerance  Cardiovascular ROS: positive for - chest pain after he ran out of his blood pressure medication  Gastrointestinal ROS: no abdominal pain, change in bowel habits, or black or bloody stools  Neurological ROS: negative for - numbness/tingling or weakness  Dermatological ROS: negative for - rash or skin lesion changes    All other systems reviewed and are negative. Objective:Vitals:    08/22/17 1011   BP: 161/74   Pulse: 62   Resp: 17   SpO2: 98%   Weight: 173 lb 12.8 oz (78.8 kg)   Height: 5' 7\" (1.702 m)   PainSc:   0 - No pain   PainLoc: Neck       alert, well appearing, and in no distress, oriented to person, place, and time and overweight  Mental status - normal mood, behavior, speech, dress, motor activity, and thought processes  Neck - supple, no significant adenopathy; posterior TTP  Chest - clear to auscultation, no wheezes, rales or rhonchi, symmetric air entry  Heart - normal rate, regular rhythm, normal S1, S2, no murmurs, rubs, clicks or gallops    Assessment/Plan:    1. Essential hypertension  Goal <140/90; pt did not take antihypertensive medication today; monitor at follow-up  - amLODIPine (NORVASC) 5 mg tablet; TAKE ONE TABLET BY MOUTH ONCE DAILY  Dispense: 30 Tab; Refill: 11    2. Cervical pain  Exercises provided; improved pain with prednisone  - predniSONE (DELTASONE) 20 mg tablet; Take 3 Tabs by mouth daily (with breakfast). Dispense: 21 Tab; Refill: 0    3.  Moderate persistent asthma without complication  Continue inhaled therapy  - albuterol (PROVENTIL HFA, VENTOLIN HFA, PROAIR HFA) 90 mcg/actuation inhaler; Take 2 Puffs by inhalation every six (6) hours as needed for Wheezing or Shortness of Breath. Dispense: 1 Inhaler; Refill: 12  - fluticasone (FLOVENT HFA) 44 mcg/actuation inhaler; Take 1 Puff by inhalation two (2) times a day. Indications: MAINTENANCE THERAPY FOR ASTHMA  Dispense: 1 Inhaler; Refill: 2    4. Chest congestion  Encourage smoking cessation which was discussed for >3 minutes; start guaifenesin. Lab review: no lab studies available for review at time of visit      I have discussed the diagnosis with the patient and the intended plan as seen in the above orders. The patient has received an after-visit summary and questions were answered concerning future plans. I have discussed medication side effects and warnings with the patient as well. I have reviewed the plan of care with the patient, accepted their input and they are in agreement with the treatment goals. Follow-up Disposition:  Return in about 5 months (around 1/22/2018), or if symptoms worsen or fail to improve, for medicare wellness.

## 2017-08-22 NOTE — PROGRESS NOTES
Steve Corrales is a 61 y.o male that is present in the office for a complete physical.    1. Have you been to the ER, urgent care clinic since your last visit? Hospitalized since your last visit? No    2. Have you seen or consulted any other health care providers outside of the [address] [place] [address] since your last visit? Include any pap smears or colon screening.  No

## 2017-08-22 NOTE — MR AVS SNAPSHOT
Visit Information Date & Time Provider Department Dept. Phone Encounter #  
 8/22/2017 10:15 AM Benito Fonseca, [address] [facility] [phone] [id number] Follow-up Instructions Return in about 5 months (around 1/22/2018), or if symptoms worsen or fail to improve, for medicare wellness. Upcoming Health Maintenance Date Due DTaP/Tdap/Td series (1 - Tdap) 3/14/1980 INFLUENZA AGE 9 TO ADULT 8/1/2017 COLONOSCOPY 6/11/2024 Allergies as of 8/22/2017  Review Complete On: 8/22/2017 By: Benito Fonseca MD  
  
 Severity Noted Reaction Type Reactions Neurontin [Gabapentin]  06/11/2014    Other (comments) Upset stomach Current Immunizations  Reviewed on 9/6/2016 Name Date Influenza Vaccine (Quad) PF 9/6/2016 11:52 AM, 11/6/2015  2:37 PM  
 Influenza Vaccine PF 11/14/2013  1:31 PM  
 Pneumococcal Polysaccharide (PPSV-23) 2/5/2016 Not reviewed this visit You Were Diagnosed With   
  
 Codes Comments Chest congestion    -  Primary ICD-10-CM: R09.89 ICD-9-CM: 786.9 Essential hypertension     ICD-10-CM: I10 
ICD-9-CM: 401.9 Cervical pain     ICD-10-CM: M54.2 ICD-9-CM: 723.1 Moderate persistent asthma without complication     [facility]-78-CI: J45.40 ICD-9-CM: 493.90   
 H/O melanoma excision     ICD-10-CM: Z98.890, Z85.820 ICD-9-CM: V15.29 Vitals BP Pulse Resp Height(growth percentile) Weight(growth percentile) SpO2  
 161/74 (BP 1 Location: Right arm, BP Patient Position: Sitting) 62 17 5' 7\" (1.702 m) 173 lb 12.8 oz (78.8 kg) 98% BMI Smoking Status 27.22 kg/m2 Current Every Day Smoker BMI and BSA Data Body Mass Index Body Surface Area  
 27.22 kg/m 2 1.93 m 2 Preferred Pharmacy Pharmacy Name Phone [facility] [address], [address] [place] [address] [phone] Your Updated Medication List  
  
   
This list is accurate as of: 8/22/17 11:03 AM.  Always use your most recent med list.  
  
  
  
  
 albuterol 90 mcg/actuation inhaler Commonly known as:  PROVENTIL HFA, VENTOLIN HFA, PROAIR HFA Take 2 Puffs by inhalation every six (6) hours as needed for Wheezing or Shortness of Breath. AMITIZA 24 mcg capsule Generic drug:  lubiPROStone Take 1 Cap by mouth two (2) times daily (with meals). amitriptyline 25 mg tablet Commonly known as:  ELAVIL Take 1 Tab by mouth nightly. amLODIPine 5 mg tablet Commonly known as:  Clarice João TAKE ONE TABLET BY MOUTH ONCE DAILY  
  
 celecoxib 200 mg capsule Commonly known as:  CeleBREX Take 1 Cap by mouth two (2) times a day. citalopram 10 mg tablet Commonly known as:  Donice Apt Take 1 Tab by mouth every evening. Indications: GENERALIZED ANXIETY DISORDER  
  
 fluorouracil 5 % chemo cream  
Commonly known as:  EFUDEX Apply  to affected area two (2) times a day. fluticasone 44 mcg/actuation inhaler Commonly known as:  FLOVENT HFA Take 1 Puff by inhalation two (2) times a day. Indications: MAINTENANCE THERAPY FOR ASTHMA  
  
 guaiFENesin  mg ER tablet Commonly known as:  Tony & Tony Take 1 Tab by mouth two (2) times a day. LORazepam 1 mg tablet Commonly known as:  ATIVAN Take 1 Tab by mouth every eight (8) hours as needed for Anxiety. Max Daily Amount: 3 mg.  
  
 losartan 25 mg tablet Commonly known as:  COZAAR Take 1 Tab by mouth daily. Indications: HYPERTENSION  
  
 oxyCODONE-acetaminophen 5-325 mg per tablet Commonly known as:  PERCOCET Take 1 Tab by mouth every four (4) hours as needed for Pain. Max Daily Amount: 6 Tabs. predniSONE 20 mg tablet Commonly known as:  Trina Gong Take 3 Tabs by mouth daily (with breakfast). tiZANidine 4 mg tablet Commonly known as:  Antwan Able Take 1 Tab by mouth every six (6) hours as needed. Prescriptions Sent to Pharmacy  Refills  
 amLODIPine (NORVASC) 5 mg tablet 11  
 Sig: TAKE ONE TABLET BY MOUTH ONCE DAILY Class: Normal  
 Pharmacy: [facility] [address], [address] Ph #: [phone]  
 predniSONE (DELTASONE) 20 mg tablet 0 Sig: Take 3 Tabs by mouth daily (with breakfast). Class: Normal  
 Pharmacy: [facility] [address], [address] Ph #: [phone] Route: Oral  
 albuterol (PROVENTIL HFA, VENTOLIN HFA, PROAIR HFA) 90 mcg/actuation inhaler 12 Sig: Take 2 Puffs by inhalation every six (6) hours as needed for Wheezing or Shortness of Breath. Class: Normal  
 Pharmacy: [facility] [address], [address] Ph #: [phone] Route: Inhalation  
 guaiFENesin ER (MUCINEX) 600 mg ER tablet 0 Sig: Take 1 Tab by mouth two (2) times a day. Class: Normal  
 Pharmacy: [facility] [address], [address] Ph #: [phone] Route: Oral  
 fluticasone (FLOVENT HFA) 44 mcg/actuation inhaler 2 Sig: Take 1 Puff by inhalation two (2) times a day. Indications: MAINTENANCE THERAPY FOR ASTHMA Class: Normal  
 Pharmacy: [facility] [address], [address] Ph #: [phone] Route: Inhalation  
 fluorouracil (EFUDEX) 5 % chemo cream 0 Sig: Apply  to affected area two (2) times a day. Class: Normal  
 Pharmacy: [facility] [address], [address] Ph #: [phone] Route: Topical  
  
Follow-up Instructions Return in about 5 months (around 1/22/2018), or if symptoms worsen or fail to improve, for medicare wellness. Introducing [facility]! Dear Catherine Hernadez: Thank you for requesting a [employer] account. Our records indicate that you have previously registered for a [employer] account but its currently inactive. Please call our [employer] support line at [phone]. Additional Information If you have questions, please visit the Frequently Asked Questions section of the [employer] website at [email or web address] Remember, [employer] is NOT to be used for urgent needs. For medical emergencies, dial 911. Now available from your iPhone and Android! Please provide this summary of care documentation to your next provider. Your primary care clinician is listed as Grabiel Camp. If you have any questions after today's visit, please call [phone].

## 2017-09-29 ENCOUNTER — OFFICE VISIT (OUTPATIENT)
Dept: FAMILY MEDICINE CLINIC | Age: 57
End: 2017-09-29

## 2017-09-29 VITALS
DIASTOLIC BLOOD PRESSURE: 68 MMHG | OXYGEN SATURATION: 97 % | HEIGHT: 67 IN | HEART RATE: 66 BPM | RESPIRATION RATE: 16 BRPM | SYSTOLIC BLOOD PRESSURE: 131 MMHG | WEIGHT: 178 LBS | BODY MASS INDEX: 27.94 KG/M2 | TEMPERATURE: 96.7 F

## 2017-09-29 DIAGNOSIS — M79.18 MYOFASCIAL MUSCLE PAIN: ICD-10-CM

## 2017-09-29 DIAGNOSIS — M54.2 CERVICAL PAIN: Primary | ICD-10-CM

## 2017-09-29 DIAGNOSIS — F41.9 ANXIETY: ICD-10-CM

## 2017-09-29 RX ORDER — LORAZEPAM 1 MG/1
1 TABLET ORAL
Qty: 30 TAB | Refills: 2 | Status: SHIPPED | OUTPATIENT
Start: 2017-09-29 | End: 2018-01-16 | Stop reason: SDUPTHER

## 2017-09-29 RX ORDER — TRIAMCINOLONE ACETONIDE 40 MG/ML
40 INJECTION, SUSPENSION INTRA-ARTICULAR; INTRAMUSCULAR ONCE
Qty: 1 ML | Refills: 0
Start: 2017-09-29 | End: 2017-09-29

## 2017-09-29 NOTE — PROGRESS NOTES
Juanjo Elizondo is a 61 y.o. male  Chief Complaint   Patient presents with    Neck Pain     1. Have you been to the ER, urgent care clinic since your last visit? Hospitalized since your last visit? No    2. Have you seen or consulted any other health care providers outside of the [address] [place] [address] since your last visit? Include any pap smears or colon screening.  No

## 2017-09-29 NOTE — MR AVS SNAPSHOT
Visit Information Date & Time Provider Department Dept. Phone Encounter #  
 9/29/2017 11:45 AM Jitendra Lopez, [address] [id number] [phone] Follow-up Instructions Return in about 4 months (around 1/29/2018), or if symptoms worsen or fail to improve, for medicare wellness exam. Upcoming Health Maintenance Date Due DTaP/Tdap/Td series (1 - Tdap) 3/14/1980 INFLUENZA AGE 9 TO ADULT 8/1/2017 COLONOSCOPY 6/11/2024 Allergies as of 9/29/2017  Review Complete On: 9/29/2017 By: Jitendra Lopez MD  
  
 Severity Noted Reaction Type Reactions Neurontin [Gabapentin]  06/11/2014    Other (comments) Upset stomach Current Immunizations  Reviewed on 9/6/2016 Name Date Influenza Vaccine (Quad) PF 9/6/2016 11:52 AM, 11/6/2015  2:37 PM  
 Influenza Vaccine PF 11/14/2013  1:31 PM  
 Pneumococcal Polysaccharide (PPSV-23) 2/5/2016 Not reviewed this visit You Were Diagnosed With   
  
 Codes Comments Cervical pain    -  Primary ICD-10-CM: M54.2 ICD-9-CM: 723.1 Myofascial muscle pain     ICD-10-CM: M79.1 ICD-9-CM: 729.1 Anxiety     ICD-10-CM: F41.9 ICD-9-CM: 300.00 Vitals BP Pulse Temp Resp Height(growth percentile) Weight(growth percentile) 131/68 66 96.7 °F (35.9 °C) 16 5' 7\" (1.702 m) 178 lb (80.7 kg) SpO2 BMI Smoking Status 97% 27.88 kg/m2 Current Every Day Smoker Vitals History BMI and BSA Data Body Mass Index Body Surface Area  
 27.88 kg/m 2 1.95 m 2 Preferred Pharmacy Pharmacy Name Phone [facility] [address], [address] [place] [address] [phone] Your Updated Medication List  
  
   
This list is accurate as of: 9/29/17 12:25 PM.  Always use your most recent med list.  
  
  
  
  
 albuterol 90 mcg/actuation inhaler Commonly known as:  PROVENTIL HFA, VENTOLIN HFA, PROAIR HFA  
 Take 2 Puffs by inhalation every six (6) hours as needed for Wheezing or Shortness of Breath. AMITIZA 24 mcg capsule Generic drug:  lubiPROStone Take 1 Cap by mouth two (2) times daily (with meals). amitriptyline 25 mg tablet Commonly known as:  ELAVIL Take 1 Tab by mouth nightly. amLODIPine 5 mg tablet Commonly known as:  Coleman De La Cruz TAKE ONE TABLET BY MOUTH ONCE DAILY  
  
 celecoxib 200 mg capsule Commonly known as:  CeleBREX Take 1 Cap by mouth two (2) times a day. citalopram 10 mg tablet Commonly known as:  Sindi Dusky Take 1 Tab by mouth every evening. Indications: GENERALIZED ANXIETY DISORDER  
  
 fluorouracil 5 % chemo cream  
Commonly known as:  EFUDEX Apply  to affected area two (2) times a day. fluticasone 44 mcg/actuation inhaler Commonly known as:  FLOVENT HFA Take 1 Puff by inhalation two (2) times a day. Indications: MAINTENANCE THERAPY FOR ASTHMA LORazepam 1 mg tablet Commonly known as:  ATIVAN Take 1 Tab by mouth every eight (8) hours as needed for Anxiety. Max Daily Amount: 3 mg.  
  
 losartan 25 mg tablet Commonly known as:  COZAAR Take 1 Tab by mouth daily. Indications: HYPERTENSION  
  
 tiZANidine 4 mg tablet Commonly known as:  Hosie Munda Take 1 Tab by mouth every six (6) hours as needed. triamcinolone acetonide 40 mg/mL injection Commonly known as:  KENALOG  
1 mL by IntraMUSCular route once for 1 dose. Prescriptions Printed Refills LORazepam (ATIVAN) 1 mg tablet 2 Sig: Take 1 Tab by mouth every eight (8) hours as needed for Anxiety. Max Daily Amount: 3 mg. Class: Print Route: Oral  
  
We Performed the Following INJECT TRIGGER POINT, 1 OR 2 [id number] CPT(R)] TRIAMCINOLONE ACETONIDE INJ [ [facility]] Follow-up Instructions Return in about 4 months (around 1/29/2018), or if symptoms worsen or fail to improve, for medicare wellness exam.  
  
  
Patient Instructions Neck: Exercises Your Care Instructions Here are some examples of typical rehabilitation exercises for your condition. Start each exercise slowly. Ease off the exercise if you start to have pain. Your doctor or physical therapist will tell you when you can start these exercises and which ones will work best for you. How to do the exercises Note: Stretching should make you feel a gentle stretch, but no pain. Stop any strengthening exercise that makes pain worse. Neck stretch 1. This stretch works best if you keep your shoulder down as you lean away from it. To help you remember to do this, start by relaxing your shoulders and lightly holding on to your thighs or your chair. 2. Tilt your head toward your shoulder and hold for 15 to 30 seconds. Let the weight of your head stretch your muscles. 3. If you would like a little added stretch, use your hand to gently and steadily pull your head toward your shoulder. For example, keeping your right shoulder down, lean your head to the left. 4. Repeat 2 to 4 times toward each shoulder. Diagonal neck stretch 1. Turn your head slightly toward the direction you will be stretching, and tilt your head diagonally toward your chest and hold for 15 to 30 seconds. 2. If you would like a little added stretch, use your hand to gently and steadily pull your head forward on the diagonal. 
3. Repeat 2 to 4 times toward each side. Dorsal glide stretch 1. Sit or stand tall and look straight ahead. 2. Slowly tuck your chin as you glide your head backward over your body 3. Hold for a count of 6, and then relax for up to 10 seconds. 4. Repeat 8 to 12 times. Note: The dorsal glide stretches the back of the neck. If you feel pain, do not glide so far back. Some people find this exercise easier to do while lying on their backs with an ice pack on the neck. Chest and shoulder stretch 1. Sit or stand tall and glide your head backward as in the dorsal glide stretch. 2. Raise both arms so that your hands are next to your ears. 3. Take a deep breath, and as you breathe out, lower your elbows down and behind your back. You will feel your shoulder blades slide down and together, and at the same time you will feel a stretch across your chest and the front of your shoulders. 4. Hold for about 6 seconds, and then relax for up to 10 seconds. 5. Repeat 8 to 12 times. Strengthening: Hands on head 1. Move your head backward, forward, and side to side against gentle pressure from your hands, holding each position for about 6 seconds. 2. Repeat 8 to 12 times. Follow-up care is a key part of your treatment and safety. Be sure to make and go to all appointments, and call your doctor if you are having problems. It's also a good idea to know your test results and keep a list of the medicines you take. Where can you learn more? Go to http://carolina-misbah.info/. Enter P975 in the search box to learn more about \"Neck: Exercises. \" Current as of: March 21, 2017 Content Version: 11.3 © 8781-1158 [employer]. Care instructions adapted under license by [employer] (which disclaims liability or warranty for this information). If you have questions about a medical condition or this instruction, always ask your healthcare professional. [address] any warranty or liability for your use of this information. Introducing [facility]! Dear Sincere Zuniga: Thank you for requesting a [employer] account. Our records indicate that you have previously registered for a [employer] account but its currently inactive. Please call our [employer] support line at [phone]. Additional Information If you have questions, please visit the Frequently Asked Questions section of the [employer] website at [email or web address]/. Remember, [employer] is NOT to be used for urgent needs. For medical emergencies, dial 911. Now available from your iPhone and Android! Please provide this summary of care documentation to your next provider. Your primary care clinician is listed as Kati Pitts. If you have any questions after today's visit, please call [phone].

## 2017-09-29 NOTE — PATIENT INSTRUCTIONS

## 2017-09-29 NOTE — PROGRESS NOTES
Danny Chapin is a 61 y.o.  male and presents with    Chief Complaint   Patient presents with    Neck Pain     Subjective:  Neck Pain  Patient complains of neck pain. Onset of symptoms was 2 weeks ago, gradually worsening since that time. Current symptoms are pain in bilateral neck (aching, sharp in character; 7/10 in severity). Patient denies numbness, tingling, paresthesias in upper extremities. Patient denies weakness, diminished  strength, lack of coordination. Radiation of pain:  Event that precipitate these symptoms: none known. Patient has had recurrent self limited episodes of neck pain in the past.  Previous treatments include: trigger point injections with good results. Anxiety Review:  Patient is seen for anxiety disorder. Current treatment includes Celexa, Ativan and no other therapies. Ongoing symptoms include: palpitations, dizziness, racing thoughts, psychomotor agitation, feelings of losing control, difficulty concentrating but improved. Patient denies: chest pain, suicidal ideation. Reported side effects from the treatment: none. Patient Active Problem List   Diagnosis Code    Asthma J45.909    Tobacco abuse Z72.0    GERD (gastroesophageal reflux disease) K21.9    Chronic pain G89.29    Anxiety F41.9    Bilateral primary osteoarthritis of hip M16.0    Advance care planning Z71.89     Patient Active Problem List    Diagnosis Date Noted    Advance care planning 01/26/2017    Bilateral primary osteoarthritis of hip 07/14/2016    Anxiety 05/03/2016    Chronic pain 03/07/2016    GERD (gastroesophageal reflux disease) 06/11/2014    Asthma 11/12/2013    Tobacco abuse 11/12/2013     Current Outpatient Prescriptions   Medication Sig Dispense Refill    LORazepam (ATIVAN) 1 mg tablet Take 1 Tab by mouth every eight (8) hours as needed for Anxiety.  Max Daily Amount: 3 mg. 30 Tab 2    amLODIPine (NORVASC) 5 mg tablet TAKE ONE TABLET BY MOUTH ONCE DAILY 30 Tab 11  albuterol (PROVENTIL HFA, VENTOLIN HFA, PROAIR HFA) 90 mcg/actuation inhaler Take 2 Puffs by inhalation every six (6) hours as needed for Wheezing or Shortness of Breath. 1 Inhaler 12    fluticasone (FLOVENT HFA) 44 mcg/actuation inhaler Take 1 Puff by inhalation two (2) times a day. Indications: MAINTENANCE THERAPY FOR ASTHMA 1 Inhaler 2    amitriptyline (ELAVIL) 25 mg tablet Take 1 Tab by mouth nightly. 30 Tab 0    celecoxib (CELEBREX) 200 mg capsule Take 1 Cap by mouth two (2) times a day. 180 Cap 3    tiZANidine (ZANAFLEX) 4 mg tablet Take 1 Tab by mouth every six (6) hours as needed. 40 Tab 3    losartan (COZAAR) 25 mg tablet Take 1 Tab by mouth daily. Indications: HYPERTENSION 90 Tab 3    fluorouracil (EFUDEX) 5 % chemo cream Apply  to affected area two (2) times a day. 40 g 0    citalopram (CELEXA) 10 mg tablet Take 1 Tab by mouth every evening. Indications: GENERALIZED ANXIETY DISORDER 90 Tab 3    AMITIZA 24 mcg capsule Take 1 Cap by mouth two (2) times daily (with meals). (Patient taking differently: Take 24 mcg by mouth two (2) times daily (with meals).  Indications: CHRONIC IDIOPATHIC CONSTIPATION) 60 Cap 3     Allergies   Allergen Reactions    Neurontin [Gabapentin] Other (comments)     Upset stomach     Past Medical History:   Diagnosis Date    Anxiety     Arthritis     osteo-hips    Asthma     as a child only    Bilateral primary osteoarthritis of hip 7/14/2016    Broken rib april 2013    3 broken ribs s/p fall with hemothorax    Chronic pain     hips, legs and knees    Hypertension     Psychiatric disorder     anxiety     Past Surgical History:   Procedure Laterality Date    HX HERNIA REPAIR Left     HX LUMBAR DISKECTOMY      HX MOHS PROCEDURES      (R) rotator cuff     Family History   Problem Relation Age of Onset    Dementia Mother     Cancer Father 61     Social History   Substance Use Topics    Smoking status: Current Every Day Smoker     Packs/day: 0.25     Years: 30.00  Smokeless tobacco: Never Used      Comment: pt will try to quit smoking    Alcohol use No      Comment: quit 1/2016       ROS   General ROS: negative for - chills or fever  Psychological ROS: negative for - depression  Ophthalmic ROS: positive for - uses glasses  ENT ROS: negative for - headaches  Respiratory ROS: positive for - wheezing associated with asthma with symptoms twice a week  Cardiovascular ROS: negative for - chest pain  Gastrointestinal ROS: no abdominal pain, change in bowel habits, or black or bloody stools  Genito-Urinary ROS: no dysuria, trouble voiding, or hematuria  Neurological ROS: no TIA or stroke symptoms    All other systems reviewed and are negative.       Objective:Vitals:    09/29/17 1153   BP: 131/68   Pulse: 66   Resp: 16   Temp: 96.7 °F (35.9 °C)   SpO2: 97%   Weight: 178 lb (80.7 kg)   Height: 5' 7\" (1.702 m)   PainSc:   7   PainLoc: Neck     alert, well appearing, and in no distress, oriented to person, place, and time and overweight  Mental status - normal mood, behavior, speech, dress, motor activity, and thought processes  Neck - supple, no significant adenopathy; posterior TTP  Chest - clear to auscultation, no wheezes, rales or rhonchi, symmetric air entry  Heart - normal rate, regular rhythm, normal S1, S2, no murmurs, rubs, clicks or gallops    [facility]  OFFICE PROCEDURE PROGRESS NOTE        Chart reviewed for the following:   Luis Antonio Luna MD, have reviewed the History, Physical and updated the Allergic reactions for [address] performed immediately prior to start of procedure:   Luis Antonio Luna MD, have performed the following reviews on Grayson Tejada prior to the start of the procedure:            * Patient was identified by name and date of birth   * Agreement on procedure being performed was verified  * Risks and Benefits explained to the patient  * Procedure site verified and marked as necessary  * Patient was positioned for comfort  * Understanding confirmed and consent was signed and verified     Time: .12:21 PM       Date of procedure: 9/29/2017    Procedure performed by:  Edvin Haynes MD    Provider assisted by: Dessie Gowers, LPN    Patient assisted by: self    How tolerated by patient: tolerated the procedure well with no complications    Comments: none    after obtaining informed consent the skin over bilateral cervical paraspinal muscles was prepped in sterile fashion; ethyl chloride used for topical anesthesia; 1.5 cc 1:1:1 marcaine 0.5%, lidocaine 1% without epi and kenalog 40 mg/cc injected into bilateral trigger points; EBL < 1 cc; post procedure pain 0/10      Assessment/Plan:    1. Cervical pain  Immediate relief with corticosteroid injection  - INJECT TRIGGER POINT, 1 OR 2  - TRIAMCINOLONE ACETONIDE INJ  - triamcinolone acetonide (KENALOG) 40 mg/mL injection; 1 mL by IntraMUSCular route once for 1 dose. Dispense: 1 mL; Refill: 0    2. Myofascial muscle pain  improved  - INJECT TRIGGER POINT, 1 OR 2  - TRIAMCINOLONE ACETONIDE INJ  - triamcinolone acetonide (KENALOG) 40 mg/mL injection; 1 mL by IntraMUSCular route once for 1 dose. Dispense: 1 mL; Refill: 0    3. Anxiety  Continue treatment  - LORazepam (ATIVAN) 1 mg tablet; Take 1 Tab by mouth every eight (8) hours as needed for Anxiety. Max Daily Amount: 3 mg. Dispense: 30 Tab; Refill: 2      Lab review: no lab studies available for review at time of visit      I have discussed the diagnosis with the patient and the intended plan as seen in the above orders. The patient has received an after-visit summary and questions were answered concerning future plans. I have discussed medication side effects and warnings with the patient as well. I have reviewed the plan of care with the patient, accepted their input and they are in agreement with the treatment goals.      Follow-up Disposition:  Return in about 4 months (around 1/29/2018), or if symptoms worsen or fail to improve, for medicare wellness exam.

## 2017-12-18 ENCOUNTER — OFFICE VISIT (OUTPATIENT)
Dept: FAMILY MEDICINE CLINIC | Age: 57
End: 2017-12-18

## 2017-12-18 DIAGNOSIS — Z28.21 REFUSED INFLUENZA VACCINE: ICD-10-CM

## 2017-12-18 DIAGNOSIS — J45.40 MODERATE PERSISTENT ASTHMA WITHOUT COMPLICATION: ICD-10-CM

## 2017-12-18 DIAGNOSIS — F41.9 ANXIETY: ICD-10-CM

## 2017-12-18 DIAGNOSIS — I10 ESSENTIAL HYPERTENSION: ICD-10-CM

## 2017-12-18 DIAGNOSIS — F17.210 CIGARETTE NICOTINE DEPENDENCE WITHOUT COMPLICATION: ICD-10-CM

## 2017-12-18 DIAGNOSIS — N52.9 ERECTILE DYSFUNCTION, UNSPECIFIED ERECTILE DYSFUNCTION TYPE: Primary | ICD-10-CM

## 2017-12-18 RX ORDER — SILDENAFIL 100 MG/1
100 TABLET, FILM COATED ORAL AS NEEDED
Qty: 12 TAB | Refills: 1 | Status: SHIPPED | OUTPATIENT
Start: 2017-12-18 | End: 2018-01-16 | Stop reason: SDUPTHER

## 2017-12-18 NOTE — MR AVS SNAPSHOT
Visit Information Date & Time Provider Department Dept. Phone Encounter #  
 12/18/2017  1:15 PM Katie Richardson [facility] [phone] Follow-up Instructions Return in about 30 days (around 1/17/2018) for medication evaluation . Upcoming Health Maintenance Date Due DTaP/Tdap/Td series (1 - Tdap) 3/14/1980 COLONOSCOPY 6/11/2024 Allergies as of 12/18/2017  Review Complete On: 12/18/2017 By: Katie Richardson MD  
  
 Severity Noted Reaction Type Reactions Neurontin [Gabapentin]  06/11/2014    Other (comments) Upset stomach Current Immunizations  Reviewed on 9/6/2016 Name Date Influenza Vaccine (Quad) PF 9/6/2016 11:52 AM, 11/6/2015  2:37 PM  
 Influenza Vaccine PF 11/14/2013  1:31 PM  
 Pneumococcal Polysaccharide (PPSV-23) 2/5/2016 Not reviewed this visit You Were Diagnosed With   
  
 Codes Comments Erectile dysfunction, unspecified erectile dysfunction type    -  Primary ICD-10-CM: N52.9 ICD-9-CM: 607.84 Essential hypertension     ICD-10-CM: I10 
ICD-9-CM: 401.9 Moderate persistent asthma without complication     LLW-66-DE: J45.40 ICD-9-CM: 493.90 Cigarette nicotine dependence without complication     ZDD-12-SW: F17.210 ICD-9-CM: 305.1 Anxiety     ICD-10-CM: F41.9 ICD-9-CM: 300.00 Refused influenza vaccine     ICD-10-CM: Z28.21 ICD-9-CM: V64.06 Vitals BP Pulse Temp Resp Height(growth percentile) Weight(growth percentile) 160/90 (BP 1 Location: Left arm, BP Patient Position: Sitting) (!) 59 96.7 °F (35.9 °C) (Oral) 16 5' 7\" (1.702 m) 171 lb 12.8 oz (77.9 kg) SpO2 BMI Smoking Status 98% 26.91 kg/m2 Current Every Day Smoker Vitals History BMI and BSA Data Body Mass Index Body Surface Area  
 26.91 kg/m 2 1.92 m 2 Preferred Pharmacy Pharmacy Name Phone [facility] [address], [address] [place] [address] [phone] Your Updated Medication List  
  
   
This list is accurate as of: 12/18/17  2:19 PM.  Always use your most recent med list.  
  
  
  
  
 albuterol 90 mcg/actuation inhaler Commonly known as:  PROVENTIL HFA, VENTOLIN HFA, PROAIR HFA Take 2 Puffs by inhalation every six (6) hours as needed for Wheezing or Shortness of Breath. amitriptyline 25 mg tablet Commonly known as:  ELAVIL Take 1 Tab by mouth nightly. amLODIPine 5 mg tablet Commonly known as:  Rylie Spruce TAKE ONE TABLET BY MOUTH ONCE DAILY  
  
 celecoxib 200 mg capsule Commonly known as:  CeleBREX Take 1 Cap by mouth two (2) times a day. fluorouracil 5 % chemo cream  
Commonly known as:  EFUDEX Apply  to affected area two (2) times a day. fluticasone 44 mcg/actuation inhaler Commonly known as:  FLOVENT HFA Take 1 Puff by inhalation two (2) times a day. Indications: MAINTENANCE THERAPY FOR ASTHMA LORazepam 1 mg tablet Commonly known as:  ATIVAN Take 1 Tab by mouth every eight (8) hours as needed for Anxiety. Max Daily Amount: 3 mg.  
  
 losartan 25 mg tablet Commonly known as:  COZAAR Take 1 Tab by mouth daily. Indications: HYPERTENSION  
  
 sildenafil citrate 100 mg tablet Commonly known as:  VIAGRA Take 1 Tab by mouth as needed. tiZANidine 4 mg tablet Commonly known as:  Jessika Aydlett Take 1 Tab by mouth every six (6) hours as needed. Prescriptions Sent to Pharmacy Refills  
 sildenafil citrate (VIAGRA) 100 mg tablet 1 Sig: Take 1 Tab by mouth as needed. Class: Normal  
 Pharmacy: [facility] [address], [address] Ph #: [phone] Route: Oral  
  
Follow-up Instructions Return in about 30 days (around 1/17/2018) for medication evaluation . Introducing [facility]! Dear Juanjo Sanz: Thank you for requesting a MyPrintCloud account.   Our records indicate that you have previously registered for a MyPrintCloud account but its currently inactive. Please call our CorCardia support line at [phone]. Additional Information If you have questions, please visit the Frequently Asked Questions section of the [employer] website at [email or web address] Remember, CorCardia is NOT to be used for urgent needs. For medical emergencies, dial 911. Now available from your iPhone and Android! Please provide this summary of care documentation to your next provider. Your primary care clinician is listed as Oly Boucher. If you have any questions after today's visit, please call [phone].

## 2017-12-18 NOTE — PROGRESS NOTES
Eunice Mae is a 61 y.o.  male and presents with    Chief Complaint   Patient presents with    Erectile Dysfunction     Subjective:  Pt reports that he is suffering from erectile dysfunction. He used a viagra 100 mg tablet and had good results. He denies weakness or fatigue    Anxiety Review:  Patient is seen for anxiety disorder. Current treatment includes Celexa, Ativan and no other therapies. Ongoing symptoms include: palpitations, dizziness, racing thoughts, psychomotor agitation, feelings of losing control, difficulty concentrating but improved. Patient denies: chest pain, suicidal ideation. Reported side effects from the treatment: none. Asthma  Current control: Good   Current level: moderate persistent  Current symptoms: wheezing  Current controller: flovent  Last flareup: several months ago. Number of flareups in past year:1  Current symptom relief med: Ventolin      Patient Active Problem List   Diagnosis Code    Asthma J45.909    Tobacco abuse Z72.0    GERD (gastroesophageal reflux disease) K21.9    Chronic pain G89.29    Anxiety F41.9    Bilateral primary osteoarthritis of hip M16.0    Advance care planning Z71.89     Patient Active Problem List    Diagnosis Date Noted    Advance care planning 01/26/2017    Bilateral primary osteoarthritis of hip 07/14/2016    Anxiety 05/03/2016    Chronic pain 03/07/2016    GERD (gastroesophageal reflux disease) 06/11/2014    Asthma 11/12/2013    Tobacco abuse 11/12/2013     Current Outpatient Prescriptions   Medication Sig Dispense Refill    LORazepam (ATIVAN) 1 mg tablet Take 1 Tab by mouth every eight (8) hours as needed for Anxiety. Max Daily Amount: 3 mg. 30 Tab 2    amLODIPine (NORVASC) 5 mg tablet TAKE ONE TABLET BY MOUTH ONCE DAILY 30 Tab 11    albuterol (PROVENTIL HFA, VENTOLIN HFA, PROAIR HFA) 90 mcg/actuation inhaler Take 2 Puffs by inhalation every six (6) hours as needed for Wheezing or Shortness of Breath.  1 Inhaler 12    fluticasone (FLOVENT HFA) 44 mcg/actuation inhaler Take 1 Puff by inhalation two (2) times a day. Indications: MAINTENANCE THERAPY FOR ASTHMA 1 Inhaler 2    fluorouracil (EFUDEX) 5 % chemo cream Apply  to affected area two (2) times a day. 40 g 0    amitriptyline (ELAVIL) 25 mg tablet Take 1 Tab by mouth nightly. 30 Tab 0    citalopram (CELEXA) 10 mg tablet Take 1 Tab by mouth every evening. Indications: GENERALIZED ANXIETY DISORDER 90 Tab 3    celecoxib (CELEBREX) 200 mg capsule Take 1 Cap by mouth two (2) times a day. 180 Cap 3    tiZANidine (ZANAFLEX) 4 mg tablet Take 1 Tab by mouth every six (6) hours as needed. 40 Tab 3    AMITIZA 24 mcg capsule Take 1 Cap by mouth two (2) times daily (with meals). (Patient taking differently: Take 24 mcg by mouth two (2) times daily (with meals). Indications: CHRONIC IDIOPATHIC CONSTIPATION) 60 Cap 3    losartan (COZAAR) 25 mg tablet Take 1 Tab by mouth daily.  Indications: HYPERTENSION 90 Tab 3     Allergies   Allergen Reactions    Neurontin [Gabapentin] Other (comments)     Upset stomach     Past Medical History:   Diagnosis Date    Anxiety     Arthritis     osteo-hips    Asthma     as a child only    Bilateral primary osteoarthritis of hip 7/14/2016    Broken rib april 2013    3 broken ribs s/p fall with hemothorax    Chronic pain     hips, legs and knees    Hypertension     Psychiatric disorder     anxiety     Past Surgical History:   Procedure Laterality Date    HX HERNIA REPAIR Left     HX LUMBAR DISKECTOMY      HX MOHS PROCEDURES      (R) rotator cuff     Family History   Problem Relation Age of Onset    Dementia Mother     Cancer Father 61     Social History   Substance Use Topics    Smoking status: Current Every Day Smoker     Packs/day: 0.25     Years: 30.00    Smokeless tobacco: Never Used      Comment: pt will try to quit smoking    Alcohol use No      Comment: quit 1/2016       ROS   General ROS: negative for - chills or fever  Psychological ROS: negative for - depression  Ophthalmic ROS: positive for - uses glasses  ENT ROS: negative for - headaches  Respiratory ROS: positive for - wheezing associated with asthma with symptoms twice a week  Cardiovascular ROS: negative for - chest pain  Gastrointestinal ROS: no abdominal pain, change in bowel habits, or black or bloody stools  Genito-Urinary ROS: no dysuria, trouble voiding, or hematuria  Neurological ROS: no TIA or stroke symptoms    All other systems reviewed and are negative. Objective:  Vitals:    12/18/17 1343   BP: 160/90   Pulse: (!) 59   Resp: 16   Temp: 96.7 °F (35.9 °C)   TempSrc: Oral   SpO2: 98%   Weight: 171 lb 12.8 oz (77.9 kg)   Height: 5' 7\" (1.702 m)   PainSc:   0 - No pain       alert, well appearing, and in no distress, oriented to person, place, and time and normal appearing weight  Mental status - normal mood, behavior, speech, dress, motor activity, and thought processes  Chest - clear to auscultation, no wheezes, rales or rhonchi, symmetric air entry  Heart - normal rate, regular rhythm, normal S1, S2, no murmurs, rubs, clicks or gallops  Neurological - cranial nerves II through XII intact, normal gait and station    Assessment/Plan:    1. Erectile dysfunction, unspecified erectile dysfunction type  Start sildenafil; pt had good results with previous dose  - sildenafil citrate (VIAGRA) 100 mg tablet; Take 1 Tab by mouth as needed. Dispense: 12 Tab; Refill: 1    2. Essential hypertension  Goal <130/80; encourage healthy diet and exercise    3. Moderate persistent asthma without complication  Continue inhaled therapy    4. Cigarette nicotine dependence without complication  Counseled on smoking cessation    5. Anxiety  Continue current medications; symptoms greatly improved      Lab review: no lab studies available for review at time of visit      I have discussed the diagnosis with the patient and the intended plan as seen in the above orders.   The patient has received an after-visit summary and questions were answered concerning future plans. I have discussed medication side effects and warnings with the patient as well. I have reviewed the plan of care with the patient, accepted their input and they are in agreement with the treatment goals. Follow-up Disposition:  Return in about 30 days (around 1/17/2018) for medication evaluation .

## 2017-12-18 NOTE — PROGRESS NOTES
Glenny Carrero is a 61 y.o. male  Chief Complaint   Patient presents with    Complete Physical     1. Have you been to the ER, urgent care clinic since your last visit? Hospitalized since your last visit? No    2. Have you seen or consulted any other health care providers outside of the [address] [place] [address] since your last visit? Include any pap smears or colon screening.  No

## 2017-12-19 VITALS
RESPIRATION RATE: 16 BRPM | HEIGHT: 67 IN | TEMPERATURE: 96.7 F | HEART RATE: 59 BPM | DIASTOLIC BLOOD PRESSURE: 90 MMHG | OXYGEN SATURATION: 98 % | BODY MASS INDEX: 26.97 KG/M2 | SYSTOLIC BLOOD PRESSURE: 160 MMHG | WEIGHT: 171.8 LBS

## 2018-01-16 ENCOUNTER — OFFICE VISIT (OUTPATIENT)
Dept: FAMILY MEDICINE CLINIC | Age: 58
End: 2018-01-16

## 2018-01-16 VITALS
OXYGEN SATURATION: 99 % | HEIGHT: 67 IN | HEART RATE: 62 BPM | WEIGHT: 171 LBS | BODY MASS INDEX: 26.84 KG/M2 | DIASTOLIC BLOOD PRESSURE: 70 MMHG | TEMPERATURE: 96.4 F | RESPIRATION RATE: 18 BRPM | SYSTOLIC BLOOD PRESSURE: 140 MMHG

## 2018-01-16 DIAGNOSIS — N52.9 ERECTILE DYSFUNCTION, UNSPECIFIED ERECTILE DYSFUNCTION TYPE: ICD-10-CM

## 2018-01-16 DIAGNOSIS — I10 ESSENTIAL HYPERTENSION: ICD-10-CM

## 2018-01-16 DIAGNOSIS — F17.210 CIGARETTE NICOTINE DEPENDENCE WITHOUT COMPLICATION: ICD-10-CM

## 2018-01-16 DIAGNOSIS — J45.40 MODERATE PERSISTENT ASTHMA WITHOUT COMPLICATION: ICD-10-CM

## 2018-01-16 DIAGNOSIS — F41.9 ANXIETY: Primary | ICD-10-CM

## 2018-01-16 RX ORDER — BUSPIRONE HYDROCHLORIDE 7.5 MG/1
7.5 TABLET ORAL 3 TIMES DAILY
Qty: 90 TAB | Refills: 0 | Status: SHIPPED | OUTPATIENT
Start: 2018-01-16 | End: 2018-04-16 | Stop reason: SINTOL

## 2018-01-16 RX ORDER — LORAZEPAM 1 MG/1
1 TABLET ORAL
Qty: 30 TAB | Refills: 2 | Status: SHIPPED | OUTPATIENT
Start: 2018-01-16 | End: 2018-04-16 | Stop reason: SDUPTHER

## 2018-01-16 RX ORDER — SILDENAFIL 100 MG/1
100 TABLET, FILM COATED ORAL AS NEEDED
Qty: 12 TAB | Refills: 1 | Status: SHIPPED | OUTPATIENT
Start: 2018-01-16 | End: 2020-02-10

## 2018-01-16 NOTE — PROGRESS NOTES
Alonso Bryson is a 61 y.o. male  Chief Complaint   Patient presents with    Medication Evaluation     1. Have you been to the ER, urgent care clinic since your last visit? Hospitalized since your last visit? No    2. Have you seen or consulted any other health care providers outside of the [address] [place] [address] since your last visit? Include any pap smears or colon screening.  No

## 2018-01-16 NOTE — PROGRESS NOTES
Karen Duncan is a 61 y.o.  male and presents with    Chief Complaint   Patient presents with    Medication Evaluation    Anxiety     Subjective: Anxiety Review:  Patient is seen for anxiety disorder. Current treatment includes Celexa, Ativan and no other therapies. Ongoing symptoms include: palpitations, dizziness, racing thoughts, psychomotor agitation, feelings of losing control, difficulty concentrating but improved. Patient denies: chest pain, suicidal ideation. Reported side effects from the treatment: none.     Asthma  Current control: Good   Current level: moderate persistent  Current symptoms: wheezing  Current controller: flovent  Last flareup: several months ago. Number of flareups in past year:1  Current symptom relief med: Ventolin      Patient Active Problem List   Diagnosis Code    Asthma J45.909    Tobacco abuse Z72.0    GERD (gastroesophageal reflux disease) K21.9    Chronic pain G89.29    Anxiety F41.9    Bilateral primary osteoarthritis of hip M16.0    Advance care planning Z71.89     Patient Active Problem List    Diagnosis Date Noted    Advance care planning 01/26/2017    Bilateral primary osteoarthritis of hip 07/14/2016    Anxiety 05/03/2016    Chronic pain 03/07/2016    GERD (gastroesophageal reflux disease) 06/11/2014    Asthma 11/12/2013    Tobacco abuse 11/12/2013     Current Outpatient Prescriptions   Medication Sig Dispense Refill    sildenafil citrate (VIAGRA) 100 mg tablet Take 1 Tab by mouth as needed. 12 Tab 1    LORazepam (ATIVAN) 1 mg tablet Take 1 Tab by mouth every eight (8) hours as needed for Anxiety. Max Daily Amount: 3 mg. 30 Tab 2    amLODIPine (NORVASC) 5 mg tablet TAKE ONE TABLET BY MOUTH ONCE DAILY 30 Tab 11    albuterol (PROVENTIL HFA, VENTOLIN HFA, PROAIR HFA) 90 mcg/actuation inhaler Take 2 Puffs by inhalation every six (6) hours as needed for Wheezing or Shortness of Breath.  1 Inhaler 12    fluticasone (FLOVENT HFA) 44 mcg/actuation inhaler Take 1 Puff by inhalation two (2) times a day. Indications: MAINTENANCE THERAPY FOR ASTHMA 1 Inhaler 2    celecoxib (CELEBREX) 200 mg capsule Take 1 Cap by mouth two (2) times a day. 180 Cap 3    tiZANidine (ZANAFLEX) 4 mg tablet Take 1 Tab by mouth every six (6) hours as needed. 40 Tab 3    fluorouracil (EFUDEX) 5 % chemo cream Apply  to affected area two (2) times a day. 40 g 0    amitriptyline (ELAVIL) 25 mg tablet Take 1 Tab by mouth nightly. 30 Tab 0    losartan (COZAAR) 25 mg tablet Take 1 Tab by mouth daily.  Indications: HYPERTENSION 90 Tab 3     Allergies   Allergen Reactions    Neurontin [Gabapentin] Other (comments)     Upset stomach     Past Medical History:   Diagnosis Date    Anxiety     Arthritis     osteo-hips    Asthma     as a child only    Bilateral primary osteoarthritis of hip 7/14/2016    Broken rib april 2013    3 broken ribs s/p fall with hemothorax    Chronic pain     hips, legs and knees    Hypertension     Psychiatric disorder     anxiety     Past Surgical History:   Procedure Laterality Date    HX HERNIA REPAIR Left     HX LUMBAR DISKECTOMY      HX MOHS PROCEDURES      (R) rotator cuff     Family History   Problem Relation Age of Onset    Dementia Mother     Cancer Father 61     Social History   Substance Use Topics    Smoking status: Current Every Day Smoker     Packs/day: 0.25     Years: 30.00    Smokeless tobacco: Never Used      Comment: pt will try to quit smoking    Alcohol use No      Comment: quit 1/2016       ROS   General ROS: negative for - chills or fever  Psychological ROS: negative for - depression  Ophthalmic ROS: positive for - uses glasses  ENT ROS: negative for - headaches  Respiratory ROS: positive for - wheezing associated with asthma with symptoms twice a week  Cardiovascular ROS: negative for - chest pain  Gastrointestinal ROS: no abdominal pain, change in bowel habits, or black or bloody stools  Genito-Urinary ROS: no dysuria, trouble voiding, or hematuria  Neurological ROS: no TIA or stroke symptoms    All other systems reviewed and are negative. Objective:  Vitals:    01/16/18 1337   BP: 140/70   Pulse: 62   Resp: 18   Temp: 96.4 °F (35.8 °C)   TempSrc: Oral   SpO2: 99%   Weight: 171 lb (77.6 kg)   Height: 5' 7\" (1.702 m)   PainSc:   6   PainLoc: Back     alert, well appearing, and in no distress, oriented to person, place, and time and normal appearing weight  Mental status - anxious, normal behavior, speech, dress, motor activity, and thought processes  Chest - clear to auscultation, no wheezes, rales or rhonchi, symmetric air entry  Heart - normal rate, regular rhythm, normal S1, S2, no murmurs, rubs, clicks or gallops  Neurological - cranial nerves II through XII intact, normal gait and station     Assessment/Plan:    1. Anxiety  Discussed relaxation techniques and alternate treatment to avoid excessive use of BZD; start buspirone  - LORazepam (ATIVAN) 1 mg tablet; Take 1 Tab by mouth every eight (8) hours as needed for Anxiety. Max Daily Amount: 3 mg. Dispense: 30 Tab; Refill: 2  - busPIRone (BUSPAR) 7.5 mg tablet; Take 1 Tab by mouth three (3) times daily. Dispense: 90 Tab; Refill: 0    2. Essential hypertension  Goal <130/80; borderline controlled with current treatment    3. Moderate persistent asthma without complication  Continue inhaled therapy    4. Cigarette nicotine dependence without complication  Encourage smoking cessation; he is now using 1/2 per day    5. Erectile dysfunction, unspecified erectile dysfunction type  Trial of sildenafil  - sildenafil citrate (VIAGRA) 100 mg tablet; Take 1 Tab by mouth as needed. Dispense: 12 Tab; Refill: 1    Lab review: no lab studies available for review at time of visit      I have discussed the diagnosis with the patient and the intended plan as seen in the above orders.   The patient has received an after-visit summary and questions were answered concerning future plans.  I have discussed medication side effects and warnings with the patient as well. I have reviewed the plan of care with the patient, accepted their input and they are in agreement with the treatment goals. Follow-up Disposition:  Return in about 30 days (around 2/15/2018) for medication evaluation.

## 2018-01-16 NOTE — MR AVS SNAPSHOT
[address] [address] 
[phone] Patient: El Freedman MRN: [id number] DBN:[id number] Visit Information Date & Time Provider Department Dept. Phone Encounter #  
 1/16/2018  1:30 PM Elie Miller, [address] [phone] Follow-up Instructions Return in about 30 days (around 2/15/2018) for medication evaluation. Follow-up and Disposition History Upcoming Health Maintenance Date Due DTaP/Tdap/Td series (1 - Tdap) 3/14/1980 COLONOSCOPY 6/11/2024 Allergies as of 1/16/2018  Review Complete On: 1/16/2018 By: Elie Miller MD  
  
 Severity Noted Reaction Type Reactions Neurontin [Gabapentin]  06/11/2014    Other (comments) Upset stomach Current Immunizations  Reviewed on 9/6/2016 Name Date Influenza Vaccine (Quad) PF 9/6/2016 11:52 AM, 11/6/2015  2:37 PM  
 Influenza Vaccine PF 11/14/2013  1:31 PM  
 Pneumococcal Polysaccharide (PPSV-23) 2/5/2016 Not reviewed this visit You Were Diagnosed With   
  
 Codes Comments Anxiety    -  Primary ICD-10-CM: F41.9 ICD-9-CM: 300.00 Essential hypertension     ICD-10-CM: I10 
ICD-9-CM: 401.9 Moderate persistent asthma without complication     RAOUL-24-NW: J45.40 ICD-9-CM: 493.90 Cigarette nicotine dependence without complication     [place]-84-MB: F17.210 ICD-9-CM: 305.1 Erectile dysfunction, unspecified erectile dysfunction type     ICD-10-CM: N52.9 ICD-9-CM: 607.84 Vitals BP Pulse Temp Resp Height(growth percentile) Weight(growth percentile) 140/70 (BP 1 Location: Left arm, BP Patient Position: Sitting) 62 96.4 °F (35.8 °C) (Oral) 18 5' 7\" (1.702 m) 171 lb (77.6 kg) SpO2 BMI Smoking Status 99% 26.78 kg/m2 Current Every Day Smoker Vitals History BMI and BSA Data Body Mass Index Body Surface Area  
 26.78 kg/m 2 1.92 m 2 Preferred Pharmacy Pharmacy Name Phone [facility] [address], [address] [phone] Your Updated Medication List  
  
   
This list is accurate as of: 1/16/18  1:58 PM.  Always use your most recent med list.  
  
  
  
  
 albuterol 90 mcg/actuation inhaler Commonly known as:  PROVENTIL HFA, VENTOLIN HFA, PROAIR HFA Take 2 Puffs by inhalation every six (6) hours as needed for Wheezing or Shortness of Breath. amitriptyline 25 mg tablet Commonly known as:  ELAVIL Take 1 Tab by mouth nightly. amLODIPine 5 mg tablet Commonly known as:  Senora Missy TAKE ONE TABLET BY MOUTH ONCE DAILY  
  
 busPIRone 7.5 mg tablet Commonly known as:  BUSPAR Take 1 Tab by mouth three (3) times daily. celecoxib 200 mg capsule Commonly known as:  CeleBREX Take 1 Cap by mouth two (2) times a day. fluorouracil 5 % chemo cream  
Commonly known as:  EFUDEX Apply  to affected area two (2) times a day. fluticasone 44 mcg/actuation inhaler Commonly known as:  FLOVENT HFA Take 1 Puff by inhalation two (2) times a day. Indications: MAINTENANCE THERAPY FOR ASTHMA LORazepam 1 mg tablet Commonly known as:  ATIVAN Take 1 Tab by mouth every eight (8) hours as needed for Anxiety. Max Daily Amount: 3 mg.  
  
 losartan 25 mg tablet Commonly known as:  COZAAR Take 1 Tab by mouth daily. Indications: HYPERTENSION  
  
 sildenafil citrate 100 mg tablet Commonly known as:  VIAGRA Take 1 Tab by mouth as needed. tiZANidine 4 mg tablet Commonly known as:  Edel Ray Take 1 Tab by mouth every six (6) hours as needed. Prescriptions Printed Refills  
 sildenafil citrate (VIAGRA) 100 mg tablet 1 Sig: Take 1 Tab by mouth as needed. Class: Print Route: Oral  
 LORazepam (ATIVAN) 1 mg tablet 2 Sig: Take 1 Tab by mouth every eight (8) hours as needed for Anxiety. Max Daily Amount: 3 mg. Class: Print  Route: Oral  
  
 Prescriptions Sent to Pharmacy Refills  
 busPIRone (BUSPAR) 7.5 mg tablet 0 Sig: Take 1 Tab by mouth three (3) times daily. Class: Normal  
 Pharmacy: [facility] [address] ALLY MCGARRY [address], [address]  #: [phone] Route: Oral  
  
Follow-up Instructions Return in about 30 days (around 2/15/2018) for medication evaluation. Introducing [address]! Dear Lay Lehman: Thank you for requesting a [employer] account. Our records indicate that you have previously registered for a [employer] account but its currently inactive. Please call our [employer] support line at [phone]. Additional Information If you have questions, please visit the Frequently Asked Questions section of the [employer] website at [email or web address] Remember, [employer] is NOT to be used for urgent needs. For medical emergencies, dial 911. Now available from your iPhone and Android! Please provide this summary of care documentation to your next provider. Your primary care clinician is listed as Cloteal Broeligio. If you have any questions after today's visit, please call [phone].

## 2018-01-25 ENCOUNTER — DOCUMENTATION ONLY (OUTPATIENT)
Dept: FAMILY MEDICINE CLINIC | Age: 58
End: 2018-01-25

## 2018-01-25 NOTE — LETTER
1/25/2018 Samantha Harris [address] [facility] [id number] Dear Mr. Samantha Harris, We had an appointment reserved for you 1/15/2018 and were concerned when you did not show or call within 24 hours to cancel the appointment. Our policy is to call patients two days prior to their appointment to remind them of the date and time. We perform these calls as a courtesy to our patients and to allow us the opportunity to rebook the time slot should the appointment not be necessary. Recognizing that everyones time is valuable and that appointment time is limited, we ask that you provide 24 hours notice if you are unable to keep your appointment. Please call us at your earliest convenience to reschedule your appointment as your provider felt it was important to see you. Thank you for your anticipated cooperation. The scheduling staff: 
 
[address] [place] [address],[address] [id number] [phone]

## 2018-02-09 DIAGNOSIS — M54.2 CERVICAL PAIN: ICD-10-CM

## 2018-02-09 RX ORDER — TIZANIDINE 4 MG/1
TABLET ORAL
Qty: 40 TAB | Refills: 3 | Status: SHIPPED | OUTPATIENT
Start: 2018-02-09 | End: 2018-04-16 | Stop reason: SDUPTHER

## 2018-04-16 ENCOUNTER — OFFICE VISIT (OUTPATIENT)
Dept: FAMILY MEDICINE CLINIC | Age: 58
End: 2018-04-16

## 2018-04-16 VITALS
HEART RATE: 61 BPM | HEIGHT: 67 IN | RESPIRATION RATE: 17 BRPM | TEMPERATURE: 96.1 F | DIASTOLIC BLOOD PRESSURE: 80 MMHG | OXYGEN SATURATION: 97 % | BODY MASS INDEX: 26.06 KG/M2 | WEIGHT: 166 LBS | SYSTOLIC BLOOD PRESSURE: 140 MMHG

## 2018-04-16 DIAGNOSIS — Z00.00 MEDICARE ANNUAL WELLNESS VISIT, SUBSEQUENT: Primary | ICD-10-CM

## 2018-04-16 DIAGNOSIS — Z71.89 ADVANCE CARE PLANNING: ICD-10-CM

## 2018-04-16 DIAGNOSIS — M54.50 CHRONIC LEFT-SIDED LOW BACK PAIN WITHOUT SCIATICA: ICD-10-CM

## 2018-04-16 DIAGNOSIS — G89.29 CHRONIC LEFT-SIDED LOW BACK PAIN WITHOUT SCIATICA: ICD-10-CM

## 2018-04-16 DIAGNOSIS — F17.210 CIGARETTE NICOTINE DEPENDENCE WITHOUT COMPLICATION: ICD-10-CM

## 2018-04-16 DIAGNOSIS — F41.9 ANXIETY: ICD-10-CM

## 2018-04-16 DIAGNOSIS — M54.2 CERVICAL PAIN: ICD-10-CM

## 2018-04-16 RX ORDER — LORAZEPAM 1 MG/1
1 TABLET ORAL
Qty: 30 TAB | Refills: 2 | Status: SHIPPED | OUTPATIENT
Start: 2018-04-16 | End: 2018-09-07 | Stop reason: SDUPTHER

## 2018-04-16 RX ORDER — TIZANIDINE 4 MG/1
TABLET ORAL
Qty: 40 TAB | Refills: 3 | Status: SHIPPED | OUTPATIENT
Start: 2018-04-16 | End: 2018-10-08 | Stop reason: SDUPTHER

## 2018-04-16 RX ORDER — CELECOXIB 200 MG/1
200 CAPSULE ORAL 2 TIMES DAILY
Qty: 180 CAP | Refills: 3 | Status: SHIPPED | OUTPATIENT
Start: 2018-04-16 | End: 2018-09-07

## 2018-04-16 NOTE — PATIENT INSTRUCTIONS
Advance Directives: Care Instructions  Your Care Instructions  An advance directive is a legal way to state your wishes at the end of your life. It tells your family and your doctor what to do if you can no longer say what you want. There are two main types of advance directives. You can change them any time that your wishes change. · A living will tells your family and your doctor your wishes about life support and other treatment. · A durable power of  for health care lets you name a person to make treatment decisions for you when you can't speak for yourself. This person is called a health care agent. If you do not have an advance directive, decisions about your medical care may be made by a doctor or a  who doesn't know you. It may help to think of an advance directive as a gift to the people who care for you. If you have one, they won't have to make tough decisions by themselves. Follow-up care is a key part of your treatment and safety. Be sure to make and go to all appointments, and call your doctor if you are having problems. It's also a good idea to know your test results and keep a list of the medicines you take. How can you care for yourself at home? · Discuss your wishes with your loved ones and your doctor. This way, there are no surprises. · Many states have a unique form. Or you might use a universal form that has been approved by many states. This kind of form can sometimes be completed and stored online. Your electronic copy will then be available wherever you have a connection to the Internet. In most cases, doctors will respect your wishes even if you have a form from a different state. · You don't need a  to do an advance directive. But you may want to get legal advice. · Think about these questions when you prepare an advance directive:  ¨ Who do you want to make decisions about your medical care if you are not able to?  Many people choose a family member or close friend. ¨ Do you know enough about life support methods that might be used? If not, talk to your doctor so you understand. ¨ What are you most afraid of that might happen? You might be afraid of having pain, losing your independence, or being kept alive by machines. ¨ Where would you prefer to die? Choices include your home, a hospital, or a nursing home. ¨ Would you like to have information about hospice care to support you and your family? ¨ Do you want to donate organs when you die? ¨ Do you want certain Protestant practices performed before you die? If so, put your wishes in the advance directive. · Read your advance directive every year, and make changes as needed. When should you call for help? Be sure to contact your doctor if you have any questions. Where can you learn more? Go to http://[email or web address]/. Enter R264 in the search box to learn more about \"Advance Directives: Care Instructions. \"  Current as of: September 24, 2016  Content Version: 11.4  © 8814-0419 [employer]. Care instructions adapted under license by [employer] (which disclaims liability or warranty for this information). If you have questions about a medical condition or this instruction, always ask your healthcare professional. [place] [address] any warranty or liability for your use of this information. Back Stretches: Exercises  Your Care Instructions  Here are some examples of exercises for stretching your back. Start each exercise slowly. Ease off the exercise if you start to have pain. Your doctor or physical therapist will tell you when you can start these exercises and which ones will work best for you. How to do the exercises  Overhead stretch    1. Stand comfortably with your feet shoulder-width apart. 2. Looking straight ahead, raise both arms over your head and reach toward the ceiling. Do not allow your head to tilt back.   3. Hold for 15 to 30 seconds, then lower your arms to your sides. 4. Repeat 2 to 4 times. Side stretch    1. Stand comfortably with your feet shoulder-width apart. 2. Raise one arm over your head, and then lean to the other side. 3. Slide your hand down your leg as you let the weight of your arm gently stretch your side muscles. Hold for 15 to 30 seconds. 4. Repeat 2 to 4 times on each side. Press-up    1. Lie on your stomach, supporting your body with your forearms. 2. Press your elbows down into the floor to raise your upper back. As you do this, relax your stomach muscles and allow your back to arch without using your back muscles. As your press up, do not let your hips or pelvis come off the floor. 3. Hold for 15 to 30 seconds, then relax. 4. Repeat 2 to 4 times. Relax and rest    1. Lie on your back with a rolled towel under your neck and a pillow under your knees. Extend your arms comfortably to your sides. 2. Relax and breathe normally. 3. Remain in this position for about 10 minutes. 4. If you can, do this 2 or 3 times each day. Follow-up care is a key part of your treatment and safety. Be sure to make and go to all appointments, and call your doctor if you are having problems. It's also a good idea to know your test results and keep a list of the medicines you take. Where can you learn more? Go to http://carolina-misbah.info/. Enter W784 in the search box to learn more about \"Back Stretches: Exercises. \"  Current as of: March 21, 2017  Content Version: 11.4  © 2768-8114 [employer]. Care instructions adapted under license by [employer] (which disclaims liability or warranty for this information). If you have questions about a medical condition or this instruction, always ask your healthcare professional. [place] [address] any warranty or liability for your use of this information.        Well Visit, Men 48 to 72: Care Instructions  Your Care Instructions    Physical exams can help you stay healthy. Your doctor has checked your overall health and may have suggested ways to take good care of yourself. He or she also may have recommended tests. At home, you can help prevent illness with healthy eating, regular exercise, and other steps. Follow-up care is a key part of your treatment and safety. Be sure to make and go to all appointments, and call your doctor if you are having problems. It's also a good idea to know your test results and keep a list of the medicines you take. How can you care for yourself at home? · Reach and stay at a healthy weight. This will lower your risk for many problems, such as obesity, diabetes, heart disease, and high blood pressure. · Get at least 30 minutes of exercise on most days of the week. Walking is a good choice. You also may want to do other activities, such as running, swimming, cycling, or playing tennis or team sports. · Do not smoke. Smoking can make health problems worse. If you need help quitting, talk to your doctor about stop-smoking programs and medicines. These can increase your chances of quitting for good. · Protect your skin from too much sun. When you're outdoors from 10 a.m. to 4 p.m., stay in the shade or cover up with clothing and a hat with a wide brim. Wear sunglasses that block UV rays. Even when it's cloudy, put broad-spectrum sunscreen (SPF 30 or higher) on any exposed skin. · See a dentist one or two times a year for checkups and to have your teeth cleaned. · Wear a seat belt in the car. · Limit alcohol to 2 drinks a day. Too much alcohol can cause health problems. Follow your doctor's advice about when to have certain tests. These tests can spot problems early. · Cholesterol. Your doctor will tell you how often to have this done based on your overall health and other things that can increase your risk for heart attack and stroke. · Blood pressure.  Have your blood pressure checked during a routine doctor visit. Your doctor will tell you how often to check your blood pressure based on your age, your blood pressure results, and other factors. · Prostate exam. Talk to your doctor about whether you should have a blood test (called a PSA test) for prostate cancer. Experts disagree on whether men should have this test. Some experts recommend that you discuss the benefits and risks of the test with your doctor. · Diabetes. Ask your doctor whether you should have tests for diabetes. · Vision. Some experts recommend that you have yearly exams for glaucoma and other age-related eye problems starting at age 48. · Hearing. Tell your doctor if you notice any change in your hearing. You can have tests to find out how well you hear. · Colon cancer. You should begin tests for colon cancer at age 48. You may have one of several tests. Your doctor will tell you how often to have tests based on your age and risk. Risks include whether you already had a precancerous polyp removed from your colon or whether your parent, brother, sister, or child has had colon cancer. · Heart attack and stroke risk. At least every 4 to 6 years, you should have your risk for heart attack and stroke assessed. Your doctor uses factors such as your age, blood pressure, cholesterol, and whether you smoke or have diabetes to show what your risk for a heart attack or stroke is over the next 10 years. · Abdominal aortic aneurysm. Ask your doctor whether you should have a test to check for an aneurysm. You may need a test if you ever smoked or if your parent, brother, sister, or child has had an aneurysm. When should you call for help? Watch closely for changes in your health, and be sure to contact your doctor if you have any problems or symptoms that concern you. Where can you learn more? Go to http://carolina-misbah.info/.   Enter R900 in the search box to learn more about \"Well Visit, Men 48 to 72: Care Instructions. \"  Current as of: May 12, 2017  Content Version: 11.4  © 9391-7164 [employer]. Care instructions adapted under license by [employer] (which disclaims liability or warranty for this information). If you have questions about a medical condition or this instruction, always ask your healthcare professional. [address] any warranty or liability for your use of this information. Stopping Smoking: Care Instructions  Your Care Instructions    Cigarette smokers crave the nicotine in cigarettes. Giving it up is much harder than simply changing a habit. Your body has to stop craving the nicotine. It is hard to quit, but you can do it. There are many tools that people use to quit smoking. You may find that combining tools works best for you. There are several steps to quitting. First you get ready to quit. Then you get support to help you. After that, you learn new skills and behaviors to become a nonsmoker. For many people, a necessary step is getting and using medicine. Your doctor will help you set up the plan that best meets your needs. You may want to attend a smoking cessation program to help you quit smoking. When you choose a program, look for one that has proven success. Ask your doctor for ideas. You will greatly increase your chances of success if you take medicine as well as get counseling or join a cessation program.  Some of the changes you feel when you first quit tobacco are uncomfortable. Your body will miss the nicotine at first, and you may feel short-tempered and grumpy. You may have trouble sleeping or concentrating. Medicine can help you deal with these symptoms. You may struggle with changing your smoking habits and rituals. The last step is the tricky one: Be prepared for the smoking urge to continue for a time. This is a lot to deal with, but keep at it. You will feel better. Follow-up care is a key part of your treatment and safety. Be sure to make and go to all appointments, and call your doctor if you are having problems. It's also a good idea to know your test results and keep a list of the medicines you take. How can you care for yourself at home? · Ask your family, friends, and coworkers for support. You have a better chance of quitting if you have help and support. · Join a support group, such as Nicotine Anonymous, for people who are trying to quit smoking. · Consider signing up for a smoking cessation program, such as the American Lung Association's Freedom from Smoking program.  · Set a quit date. Pick your date carefully so that it is not right in the middle of a big deadline or stressful time. Once you quit, do not even take a puff. Get rid of all ashtrays and lighters after your last cigarette. Clean your house and your clothes so that they do not smell of smoke. · Learn how to be a nonsmoker. Think about ways you can avoid those things that make you reach for a cigarette. ¨ Avoid situations that put you at greatest risk for smoking. For some people, it is hard to have a drink with friends without smoking. For others, they might skip a coffee break with coworkers who smoke. ¨ Change your daily routine. Take a different route to work or eat a meal in a different place. · Cut down on stress. Calm yourself or release tension by doing an activity you enjoy, such as reading a book, taking a hot bath, or gardening. · Talk to your doctor or pharmacist about nicotine replacement therapy, which replaces the nicotine in your body. You still get nicotine but you do not use tobacco. Nicotine replacement products help you slowly reduce the amount of nicotine you need. These products come in several forms, many of them available over-the-counter:  ¨ Nicotine patches  ¨ Nicotine gum and lozenges  ¨ Nicotine inhaler  · Ask your doctor about bupropion (Wellbutrin) or varenicline (Chantix), which are prescription medicines.  They do not contain nicotine. They help you by reducing withdrawal symptoms, such as stress and anxiety. · Some people find hypnosis, acupuncture, and massage helpful for ending the smoking habit. · Eat a healthy diet and get regular exercise. Having healthy habits will help your body move past its craving for nicotine. · Be prepared to keep trying. Most people are not successful the first few times they try to quit. Do not get mad at yourself if you smoke again. Make a list of things you learned and think about when you want to try again, such as next week, next month, or next year. Where can you learn more? Go to http://[email or web address].[email or web address]/. Enter H819 in the search box to learn more about \"Stopping Smoking: Care Instructions. \"  Current as of: March 20, 2017  Content Version: 11.4  © 6379-5753 [employer]. Care instructions adapted under license by [employer] (which disclaims liability or warranty for this information). If you have questions about a medical condition or this instruction, always ask your healthcare professional. [address] any warranty or liability for your use of this information.
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
## 2018-04-16 NOTE — MR AVS SNAPSHOT
[address] [facility] [address] [place] [address] [phone] 
[phone] Patient: Anna Salcedo MRN: [id number] ECI:5[birth year]03[birth year] Visit Information Date & Time Provider Department Dept. Phone Encounter #  
 4/16/2018  9:00 AM Jody Middleton, [facility]  Follow-up Instructions Return in about 1 month (around 5/16/2018) for medication evaluation and hypertension. Upcoming Health Maintenance Date Due DTaP/Tdap/Td series (1 - Tdap) 3/14/1980 MEDICARE YEARLY EXAM 3/14/2018 COLONOSCOPY 6/11/2024 Allergies as of 4/16/2018  Review Complete On: 4/16/2018 By: Jody Middleton MD  
  
 Severity Noted Reaction Type Reactions Neurontin [Gabapentin]  06/11/2014    Other (comments) Upset stomach Current Immunizations  Reviewed on 9/6/2016 Name Date Influenza Vaccine (Quad) PF 9/6/2016 11:52 AM, 11/6/2015  2:37 PM  
 Influenza Vaccine PF 11/14/2013  1:31 PM  
 Pneumococcal Polysaccharide (PPSV-23) 2/5/2016 Not reviewed this visit You Were Diagnosed With   
  
 Codes Comments Medicare annual wellness visit, subsequent    -  Primary ICD-10-CM: Z00.00 ICD-9-CM: V70.0 Anxiety     ICD-10-CM: F41.9 ICD-9-CM: 300.00 Advance care planning     ICD-10-CM: Z71.89 ICD-9-CM: V65.49 Cigarette nicotine dependence without complication     MRI-08-ME: F17.210 ICD-9-CM: 305.1 Cervical pain     ICD-10-CM: M54.2 ICD-9-CM: 723.1 Chronic left-sided low back pain without sciatica     ICD-10-CM: M54.5, G89.29 ICD-9-CM: 724.2, 338.29 Vitals BP Pulse Temp Resp Height(growth percentile) Weight(growth percentile) 140/80 (BP 1 Location: Left arm, BP Patient Position: Sitting) 61 96.1 °F (35.6 °C) (Oral) 17 5' 7\" (1.702 m) 166 lb (75.3 kg) SpO2 BMI Smoking Status 97% 26 kg/m2 Current Every Day Smoker Vitals History BMI and BSA Data Body Mass Index Body Surface Area  
 26 kg/m 2 1.89 m 2 Preferred Pharmacy Pharmacy Name Phone [address], [address] [phone] Your Updated Medication List  
  
   
This list is accurate as of 4/16/18  9:39 AM.  Always use your most recent med list.  
  
  
  
  
 albuterol 90 mcg/actuation inhaler Commonly known as:  PROVENTIL HFA, VENTOLIN HFA, PROAIR HFA Take 2 Puffs by inhalation every six (6) hours as needed for Wheezing or Shortness of Breath. amitriptyline 25 mg tablet Commonly known as:  ELAVIL Take 1 Tab by mouth nightly. amLODIPine 5 mg tablet Commonly known as:  Job Dub TAKE ONE TABLET BY MOUTH ONCE DAILY  
  
 celecoxib 200 mg capsule Commonly known as:  CeleBREX Take 1 Cap by mouth two (2) times a day. fluorouracil 5 % chemo cream  
Commonly known as:  EFUDEX Apply  to affected area two (2) times a day. fluticasone 44 mcg/actuation inhaler Commonly known as:  FLOVENT HFA Take 1 Puff by inhalation two (2) times a day. Indications: MAINTENANCE THERAPY FOR ASTHMA LORazepam 1 mg tablet Commonly known as:  ATIVAN Take 1 Tab by mouth every eight (8) hours as needed for Anxiety. Max Daily Amount: 3 mg.  
  
 losartan 25 mg tablet Commonly known as:  COZAAR Take 1 Tab by mouth daily. Indications: HYPERTENSION  
  
 sildenafil citrate 100 mg tablet Commonly known as:  VIAGRA Take 1 Tab by mouth as needed. tiZANidine 4 mg tablet Commonly known as:  Ann Marie Ozzy TAKE ONE TABLET BY MOUTH EVERY 6 HOURS AS NEEDED Prescriptions Printed Refills LORazepam (ATIVAN) 1 mg tablet 2 Sig: Take 1 Tab by mouth every eight (8) hours as needed for Anxiety. Max Daily Amount: 3 mg. Class: Print Route: Oral  
  
Prescriptions Sent to Pharmacy Refills  
 celecoxib (CELEBREX) 200 mg capsule 3 Sig: Take 1 Cap by mouth two (2) times a day.   
 Class: Normal  
 Pharmacy: [facility] DR ALLY MCGARRY [address], [address] Ph #: [phone] Route: Oral  
 tiZANidine (ZANAFLEX) 4 mg tablet 3 Sig: TAKE ONE TABLET BY MOUTH EVERY 6 HOURS AS NEEDED Class: Normal  
 Pharmacy: [facility] DR ALLY HENRIQUEZ ZEFERINO [address], [address] Ph #: [phone] We Performed the Following ADVANCE CARE PLANNING FIRST 30 MINS [15809 CPT(R)] 48764 Tunepresto [ [facility] [place]] NV SMOKING AND TOBACCO USE CESSATION 3 - 10 MINUTES [83703 CPT(R)] Follow-up Instructions Return in about 1 month (around 5/16/2018) for medication evaluation and hypertension. To-Do List   
 04/16/2018 Lab:  HEMOGLOBIN A1C WITH EAG   
  
 04/16/2018 Lab:  LIPID PANEL Patient Instructions Advance Directives: Care Instructions Your Care Instructions An advance directive is a legal way to state your wishes at the end of your life. It tells your family and your doctor what to do if you can no longer say what you want. There are two main types of advance directives. You can change them any time that your wishes change. · A living will tells your family and your doctor your wishes about life support and other treatment. · A durable power of  for health care lets you name a person to make treatment decisions for you when you can't speak for yourself. This person is called a health care agent. If you do not have an advance directive, decisions about your medical care may be made by a doctor or a  who doesn't know you. It may help to think of an advance directive as a gift to the people who care for you. If you have one, they won't have to make tough decisions by themselves. Follow-up care is a key part of your treatment and safety. Be sure to make and go to all appointments, and call your doctor if you are having problems. It's also a good idea to know your test results and keep a list of the medicines you take. How can you care for yourself at home? · Discuss your wishes with your loved ones and your doctor. This way, there are no surprises. · Many states have a unique form. Or you might use a universal form that has been approved by many states. This kind of form can sometimes be completed and stored online. Your electronic copy will then be available wherever you have a connection to the Internet. In most cases, doctors will respect your wishes even if you have a form from a different state. · You don't need a  to do an advance directive. But you may want to get legal advice. · Think about these questions when you prepare an advance directive: ¨ Who do you want to make decisions about your medical care if you are not able to? Many people choose a family member or close friend. ¨ Do you know enough about life support methods that might be used? If not, talk to your doctor so you understand. ¨ What are you most afraid of that might happen? You might be afraid of having pain, losing your independence, or being kept alive by machines. ¨ Where would you prefer to die? Choices include your home, a hospital, or a nursing home. ¨ Would you like to have information about hospice care to support you and your family? ¨ Do you want to donate organs when you die? ¨ Do you want certain [employer] practices performed before you die? If so, put your wishes in the advance directive. · Read your advance directive every year, and make changes as needed. When should you call for help? Be sure to contact your doctor if you have any questions. Where can you learn more? Go to http://carolina-misbah.info/. Enter R264 in the search box to learn more about \"Advance Directives: Care Instructions. \" Current as of: September 24, 2016 Content Version: 11.4 © 5433-2629 SkyJam.  Care instructions adapted under license by [employer] (which disclaims liability or warranty for this information). If you have questions about a medical condition or this instruction, always ask your healthcare professional. [address] any warranty or liability for your use of this information. Back Stretches: Exercises Your Care Instructions Here are some examples of exercises for stretching your back. Start each exercise slowly. Ease off the exercise if you start to have pain. Your doctor or physical therapist will tell you when you can start these exercises and which ones will work best for you. How to do the exercises Overhead stretch 1. Stand comfortably with your feet shoulder-width apart. 2. Looking straight ahead, raise both arms over your head and reach toward the ceiling. Do not allow your head to tilt back. 3. Hold for 15 to 30 seconds, then lower your arms to your sides. 4. Repeat 2 to 4 times. Side stretch 1. Stand comfortably with your feet shoulder-width apart. 2. Raise one arm over your head, and then lean to the other side. 3. Slide your hand down your leg as you let the weight of your arm gently stretch your side muscles. Hold for 15 to 30 seconds. 4. Repeat 2 to 4 times on each side. Press-up 1. Lie on your stomach, supporting your body with your forearms. 2. Press your elbows down into the floor to raise your upper back. As you do this, relax your stomach muscles and allow your back to arch without using your back muscles. As your press up, do not let your hips or pelvis come off the floor. 3. Hold for 15 to 30 seconds, then relax. 4. Repeat 2 to 4 times. Relax and rest 
 
1. Lie on your back with a rolled towel under your neck and a pillow under your knees. Extend your arms comfortably to your sides. 2. Relax and breathe normally. 3. Remain in this position for about 10 minutes. 4. If you can, do this 2 or 3 times each day. Follow-up care is a key part of your treatment and safety.  Be sure to make and go to all appointments, and call your doctor if you are having problems. It's also a good idea to know your test results and keep a list of the medicines you take. Where can you learn more? Go to http://carolina-misbah.info/. Enter Z701 in the search box to learn more about \"Back Stretches: Exercises. \" Current as of: March 21, 2017 Content Version: 11.4 © 5597-5982 [employer]. Care instructions adapted under license by [employer] (which disclaims liability or warranty for this information). If you have questions about a medical condition or this instruction, always ask your healthcare professional. [place] [address] any warranty or liability for your use of this information. Well Visit, Men 48 to 72: Care Instructions Your Care Instructions Physical exams can help you stay healthy. Your doctor has checked your overall health and may have suggested ways to take good care of yourself. He or she also may have recommended tests. At home, you can help prevent illness with healthy eating, regular exercise, and other steps. Follow-up care is a key part of your treatment and safety. Be sure to make and go to all appointments, and call your doctor if you are having problems. It's also a good idea to know your test results and keep a list of the medicines you take. How can you care for yourself at home? · Reach and stay at a healthy weight. This will lower your risk for many problems, such as obesity, diabetes, heart disease, and high blood pressure. · Get at least 30 minutes of exercise on most days of the week. Walking is a good choice. You also may want to do other activities, such as running, swimming, cycling, or playing tennis or team sports. · Do not smoke. Smoking can make health problems worse. If you need help quitting, talk to your doctor about stop-smoking programs and medicines. These can increase your chances of quitting for good. · Protect your skin from too much sun. When you're outdoors from 10 a.m. to 4 p.m., stay in the shade or cover up with clothing and a hat with a wide brim. Wear sunglasses that block UV rays. Even when it's cloudy, put broad-spectrum sunscreen (SPF 30 or higher) on any exposed skin. · See a dentist one or two times a year for checkups and to have your teeth cleaned. · Wear a seat belt in the car. · Limit alcohol to 2 drinks a day. Too much alcohol can cause health problems. Follow your doctor's advice about when to have certain tests. These tests can spot problems early. · Cholesterol. Your doctor will tell you how often to have this done based on your overall health and other things that can increase your risk for heart attack and stroke. · Blood pressure. Have your blood pressure checked during a routine doctor visit. Your doctor will tell you how often to check your blood pressure based on your age, your blood pressure results, and other factors. · Prostate exam. Talk to your doctor about whether you should have a blood test (called a PSA test) for prostate cancer. Experts disagree on whether men should have this test. Some experts recommend that you discuss the benefits and risks of the test with your doctor. · Diabetes. Ask your doctor whether you should have tests for diabetes. · Vision. Some experts recommend that you have yearly exams for glaucoma and other age-related eye problems starting at age 48. · Hearing. Tell your doctor if you notice any change in your hearing. You can have tests to find out how well you hear. · Colon cancer. You should begin tests for colon cancer at age 48. You may have one of several tests. Your doctor will tell you how often to have tests based on your age and risk.  Risks include whether you already had a precancerous polyp removed from your colon or whether your parent, brother, sister, or child has had colon cancer. · Heart attack and stroke risk. At least every 4 to 6 years, you should have your risk for heart attack and stroke assessed. Your doctor uses factors such as your age, blood pressure, cholesterol, and whether you smoke or have diabetes to show what your risk for a heart attack or stroke is over the next 10 years. · Abdominal aortic aneurysm. Ask your doctor whether you should have a test to check for an aneurysm. You may need a test if you ever smoked or if your parent, brother, sister, or child has had an aneurysm. When should you call for help? Watch closely for changes in your health, and be sure to contact your doctor if you have any problems or symptoms that concern you. Where can you learn more? Go to http://carolina-misbah.info/. Enter U482 in the search box to learn more about \"Well Visit, Men 48 to 72: Care Instructions. \" Current as of: May 12, 2017 Content Version: 11.4 © 4323-9361 EnerMotion. Care instructions adapted under license by [employer] (which disclaims liability or warranty for this information). If you have questions about a medical condition or this instruction, always ask your healthcare professional. [place] [address] any warranty or liability for your use of this information. Stopping Smoking: Care Instructions Your Care Instructions Cigarette smokers crave the nicotine in cigarettes. Giving it up is much harder than simply changing a habit. Your body has to stop craving the nicotine. It is hard to quit, but you can do it. There are many tools that people use to quit smoking. You may find that combining tools works best for you. There are several steps to quitting. First you get ready to quit. Then you get support to help you. After that, you learn new skills and behaviors to become a nonsmoker. For many people, a necessary step is getting and using medicine. Your doctor will help you set up the plan that best meets your needs. You may want to attend a smoking cessation program to help you quit smoking. When you choose a program, look for one that has proven success. Ask your doctor for ideas. You will greatly increase your chances of success if you take medicine as well as get counseling or join a cessation program. 
Some of the changes you feel when you first quit tobacco are uncomfortable. Your body will miss the nicotine at first, and you may feel short-tempered and grumpy. You may have trouble sleeping or concentrating. Medicine can help you deal with these symptoms. You may struggle with changing your smoking habits and rituals. The last step is the tricky one: Be prepared for the smoking urge to continue for a time. This is a lot to deal with, but keep at it. You will feel better. Follow-up care is a key part of your treatment and safety. Be sure to make and go to all appointments, and call your doctor if you are having problems. It's also a good idea to know your test results and keep a list of the medicines you take. How can you care for yourself at home? · Ask your family, friends, and coworkers for support. You have a better chance of quitting if you have help and support. · Join a support group, such as Nicotine Anonymous, for people who are trying to quit smoking. · Consider signing up for a smoking cessation program, such as the American Lung Association's Freedom from Smoking program. 
· Set a quit date. Pick your date carefully so that it is not right in the middle of a big deadline or stressful time. Once you quit, do not even take a puff. Get rid of all ashtrays and lighters after your last cigarette. Clean your house and your clothes so that they do not smell of smoke. · Learn how to be a nonsmoker. Think about ways you can avoid those things that make you reach for a cigarette. ¨ Avoid situations that put you at greatest risk for smoking. For some people, it is hard to have a drink with friends without smoking. For others, they might skip a coffee break with coworkers who smoke. ¨ Change your daily routine. Take a different route to work or eat a meal in a different place. · Cut down on stress. Calm yourself or release tension by doing an activity you enjoy, such as reading a book, taking a hot bath, or gardening. · Talk to your doctor or pharmacist about nicotine replacement therapy, which replaces the nicotine in your body. You still get nicotine but you do not use tobacco. Nicotine replacement products help you slowly reduce the amount of nicotine you need. These products come in several forms, many of them available over-the-counter: ¨ Nicotine patches ¨ Nicotine gum and lozenges ¨ Nicotine inhaler · Ask your doctor about bupropion (Wellbutrin) or varenicline (Chantix), which are prescription medicines. They do not contain nicotine. They help you by reducing withdrawal symptoms, such as stress and anxiety. · Some people find hypnosis, acupuncture, and massage helpful for ending the smoking habit. · Eat a healthy diet and get regular exercise. Having healthy habits will help your body move past its craving for nicotine. · Be prepared to keep trying. Most people are not successful the first few times they try to quit. Do not get mad at yourself if you smoke again. Make a list of things you learned and think about when you want to try again, such as next week, next month, or next year. Where can you learn more? Go to http://carolina-misbah.info/. Enter Y325 in the search box to learn more about \"Stopping Smoking: Care Instructions. \" Current as of: March 20, 2017 Content Version: 11.4 © 0135-0655 Healthwise, Pikimal.  Care instructions adapted under license by [employer] (which disclaims liability or warranty for this information). If you have questions about a medical condition or this instruction, always ask your healthcare professional. [address] any warranty or liability for your use of this information. Introducing [facility]! Dear Roxanne Moreau: Thank you for requesting a Duetto account. Our records indicate that you have previously registered for a Duetto account but its currently inactive. Please call our Duetto support line at [phone]. Additional Information If you have questions, please visit the Frequently Asked Questions section of the Duetto website at [email or web address] Remember, Duetto is NOT to be used for urgent needs. For medical emergencies, dial 911. Now available from your iPhone and Android! Please provide this summary of care documentation to your next provider. Your primary care clinician is listed as Shelia Starr. If you have any questions after today's visit, please call [phone].

## 2018-04-21 NOTE — ACP (ADVANCE CARE PLANNING)
Advance Care Planning (ACP) Provider Note - Comprehensive     Date of ACP Conversation: 04/16/18  Persons included in Conversation:  patient  Length of ACP Conversation in minutes:  16 minutes    Authorized Decision Maker (if patient is incapable of making informed decisions): This person is:  his sister          General ACP for ALL Patients with Decision Making Capacity:   Importance of advance care planning, including choosing a healthcare agent to communicate patient's healthcare decisions if patient lost the ability to make decisions, such as after a sudden illness or accident  Understanding of the healthcare agent role was assessed and information provided  Exploration of values, goals, and preferences if recovery is not expected, even with continued medical treatment in the event of: Imminent death  Severe, permanent brain injury  \"In these circumstances, what matters most to you? \"  Care focused more on comfort or quality of life. \"What, if any, treatments would you want to avoid? \" none  Opportunity offered to explore how cultural, Nondenominational, spiritual, or personal beliefs would affect decisions for future care     Review of Existing Advance Directive:  What information were you given about medical decisions to consider before completing your advance directive? planning form and scenarios    For Serious or Chronic Illness:  Understanding of medical condition      Interventions Provided:  Reviewed existing Advance Directive

## 2018-09-07 ENCOUNTER — OFFICE VISIT (OUTPATIENT)
Dept: FAMILY MEDICINE CLINIC | Age: 58
End: 2018-09-07

## 2018-09-07 VITALS
TEMPERATURE: 96.5 F | HEART RATE: 63 BPM | WEIGHT: 160 LBS | RESPIRATION RATE: 20 BRPM | HEIGHT: 67 IN | DIASTOLIC BLOOD PRESSURE: 71 MMHG | SYSTOLIC BLOOD PRESSURE: 142 MMHG | OXYGEN SATURATION: 97 % | BODY MASS INDEX: 25.11 KG/M2

## 2018-09-07 DIAGNOSIS — F41.9 ANXIETY: Primary | ICD-10-CM

## 2018-09-07 DIAGNOSIS — I10 ESSENTIAL HYPERTENSION: ICD-10-CM

## 2018-09-07 DIAGNOSIS — Z23 ENCOUNTER FOR IMMUNIZATION: ICD-10-CM

## 2018-09-07 DIAGNOSIS — M79.601 RIGHT ARM PAIN: ICD-10-CM

## 2018-09-07 DIAGNOSIS — Z96.641 STATUS POST RIGHT HIP REPLACEMENT: ICD-10-CM

## 2018-09-07 RX ORDER — LORAZEPAM 1 MG/1
1 TABLET ORAL
Qty: 30 TAB | Refills: 2 | Status: SHIPPED | OUTPATIENT
Start: 2018-09-07 | End: 2018-11-26 | Stop reason: SDUPTHER

## 2018-09-07 RX ORDER — LOSARTAN POTASSIUM AND HYDROCHLOROTHIAZIDE 12.5; 5 MG/1; MG/1
1 TABLET ORAL DAILY
Qty: 30 TAB | Refills: 11 | Status: SHIPPED | OUTPATIENT
Start: 2018-09-07 | End: 2018-10-08 | Stop reason: SINTOL

## 2018-09-07 RX ORDER — AMLODIPINE BESYLATE 5 MG/1
TABLET ORAL
Qty: 30 TAB | Refills: 11 | Status: SHIPPED | OUTPATIENT
Start: 2018-09-07 | End: 2018-10-08 | Stop reason: SDUPTHER

## 2018-09-07 RX ORDER — CITALOPRAM 10 MG/1
10 TABLET ORAL EVERY EVENING
Qty: 90 TAB | Refills: 3 | Status: ON HOLD | OUTPATIENT
Start: 2018-09-07 | End: 2018-11-20

## 2018-09-07 NOTE — PROGRESS NOTES
Alexander Alcantara is a 60 y.o. male who presents for routine immunizations. He denies any symptoms , reactions or allergies that would exclude them from being immunized today. Risks and adverse reactions were discussed and the VIS was given to them. All questions were addressed. He was observed for 15 min post injection. There were no reactions observed.     Celia Blas LPN
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
## 2018-09-07 NOTE — PROGRESS NOTES
Room #      SUBJECTIVE:    Nicol Terry is a 60 y.o. male who presents today for follow up for anxiety and medication refill    1. Have you been to the ER, urgent care clinic since your last visit? Hospitalized since your last visit? NO    2. Have you seen or consulted any other health care providers outside of the [facility] since your last visit? Include any pap smears or colon screening. NO  When :  Reason:    Health Maintenance reviewed  Yes    Health Maintenance Due   Topic Date Due    DTaP/Tdap/Td series (1 - Tdap) 03/14/1980    Influenza Age 5 to Adult  08/01/2018

## 2018-09-07 NOTE — PATIENT INSTRUCTIONS
Shoulder Bursitis: Exercises  Your Care Instructions  Here are some examples of typical rehabilitation exercises for your condition. Start each exercise slowly. Ease off the exercise if you start to have pain. Your doctor or physical therapist will tell you when you can start these exercises and which ones will work best for you. How to do the exercises  Posterior stretching exercise    1. Hold the elbow of your injured arm with your other hand. 2. Use your hand to pull your injured arm gently up and across your body. You will feel a gentle stretch across the back of your injured shoulder. 3. Hold for at least 15 to 30 seconds. Then slowly lower your arm. 4. Repeat 2 to 4 times. Up-the-back stretch    Your doctor or physical therapist may want you to wait to do this stretch until you have regained most of your range of motion and strength. You can do this stretch in different ways. Hold any of these stretches for at least 15 to 30 seconds. Repeat them 2 to 4 times. 1. Put your hand in your back pocket. Let it rest there to stretch your shoulder. 2. With your other hand, hold your injured arm (palm outward) behind your back by the wrist. Pull your arm up gently to stretch your shoulder. 3. Next, put a towel over your other shoulder. Put the hand of your injured arm behind your back. Now hold the back end of the towel. With the other hand, hold the front end of the towel in front of your body. Pull gently on the front end of the towel. This will bring your hand farther up your back to stretch your shoulder. Overhead stretch    1. Standing about an arm's length away, grasp onto a solid surface. You could use a countertop, a doorknob, or the back of a sturdy chair. 2. With your knees slightly bent, bend forward with your arms straight. Lower your upper body, and let your shoulders stretch. 3. As your shoulders are able to stretch farther, you may need to take a step or two backward.   4. Hold for at least 15 to 30 seconds. Then stand up and relax. If you had stepped back during your stretch, step forward so you can keep your hands on the solid surface. 5. Repeat 2 to 4 times. Shoulder flexion (lying down)    To make a wand for this exercise, use a piece of PVC pipe or a broom handle with the broom removed. Make the wand about a foot wider than your shoulders. 1. Lie on your back, holding a wand with both hands. Your palms should face down as you hold the wand. 2. Keeping your elbows straight, slowly raise your arms over your head. Raise them until you feel a stretch in your shoulders, upper back, and chest.  3. Hold for 15 to 30 seconds. 4. Repeat 2 to 4 times. Shoulder rotation (lying down)    To make a wand for this exercise, use a piece of PVC pipe or a broom handle with the broom removed. Make the wand about a foot wider than your shoulders. 1. Lie on your back. Hold a wand with both hands with your elbows bent and palms up. 2. Keep your elbows close to your body, and move the wand across your body toward the sore arm. 3. Hold for 8 to 12 seconds. 4. Repeat 2 to 4 times. Shoulder blade squeeze    1. Stand with your arms at your sides, and squeeze your shoulder blades together. Do not raise your shoulders up as you squeeze. 2. Hold 6 seconds. 3. Repeat 8 to 12 times. Shoulder flexor and extensor exercise    These are isometric exercises. That means you contract your muscles without actually moving. 1. Push forward (flex): Stand facing a wall or doorjamb, about 6 inches or less back. Hold your injured arm against your body. Make a closed fist with your thumb on top. Then gently push your hand forward into the wall with about 25% to 50% of your strength. Don't let your body move backward as you push. Hold for about 6 seconds. Relax for a few seconds. Repeat 8 to 12 times. 2. Push backward (extend): Stand with your back flat against a wall.  Your upper arm should be against the wall, with your elbow bent 90 degrees (your hand straight ahead). Push your elbow gently back against the wall with about 25% to 50% of your strength. Don't let your body move forward as you push. Hold for about 6 seconds. Relax for a few seconds. Repeat 8 to 12 times. Scapular exercise: Wall push-ups    This exercise is best done with your fingers somewhat turned out, rather than straight up and down. 1. Stand facing a wall, about 12 inches to 18 inches away. 2. Place your hands on the wall at shoulder height. 3. Slowly bend your elbows and bring your face to the wall. Keep your back and hips straight. 4. Push back to where you started. 5. Repeat 8 to 12 times. 6. When you can do this exercise against a wall comfortably, you can try it against a counter. You can then slowly progress to the end of a couch, then to a sturdy chair, and finally to the floor. Scapular exercise: Retraction    For this exercise, you will need elastic exercise material, such as surgical tubing or Thera-Band. 1. Put the band around a solid object at about waist level. (A bedpost will work well.) Each hand should hold an end of the band. 2. With your elbows at your sides and bent to 90 degrees, pull the band back. Your shoulder blades should move toward each other. Then move your arms back where you started. 3. Repeat 8 to 12 times. 4. If you have good range of motion in your shoulders, try this exercise with your arms lifted out to the sides. Keep your elbows at a 90-degree angle. Raise the elastic band up to about shoulder level. Pull the band back to move your shoulder blades toward each other. Then move your arms back where you started. Internal rotator strengthening exercise    1. Start by tying a piece of elastic exercise material to a doorknob. You can use surgical tubing or Thera-Band. 2. Stand or sit with your shoulder relaxed and your elbow bent 90 degrees. Your upper arm should rest comfortably against your side.  Squeeze a rolled towel between your elbow and your body for comfort. This will help keep your arm at your side. 3. Hold one end of the elastic band in the hand of the painful arm. 4. Slowly rotate your forearm toward your body until it touches your belly. Slowly move it back to where you started. 5. Keep your elbow and upper arm firmly tucked against the towel roll or at your side. 6. Repeat 8 to 12 times. External rotator strengthening exercise    1. Start by tying a piece of elastic exercise material to a doorknob. You can use surgical tubing or Thera-Band. (You may also hold one end of the band in each hand.)  2. Stand or sit with your shoulder relaxed and your elbow bent 90 degrees. Your upper arm should rest comfortably against your side. Squeeze a rolled towel between your elbow and your body for comfort. This will help keep your arm at your side. 3. Hold one end of the elastic band with the hand of the painful arm. 4. Start with your forearm across your belly. Slowly rotate the forearm out away from your body. Keep your elbow and upper arm tucked against the towel roll or the side of your body until you begin to feel tightness in your shoulder. Slowly move your arm back to where you started. 5. Repeat 8 to 12 times. Follow-up care is a key part of your treatment and safety. Be sure to make and go to all appointments, and call your doctor if you are having problems. It's also a good idea to know your test results and keep a list of the medicines you take. Where can you learn more? Go to http://car[facility]-misbah.info/. Enter U682 in the search box to learn more about \"Shoulder Bursitis: Exercises. \"  Current as of: November 29, 2017  Content Version: 11.7  © 3406-9877 [employer], [employer]. Care instructions adapted under license by [employer] (which disclaims liability or warranty for this information).  If you have questions about a medical condition or this instruction, always ask your healthcare professional. [address] any warranty or liability for your use of this information.

## 2018-09-07 NOTE — MR AVS SNAPSHOT
[address] [place] [id number] 
[phone] Patient: Leonard Alcantara MRN: [id number] TVN:[id number] Visit Information Date & Time Provider Department Dept. Phone Encounter #  
 9/7/2018  8:45 AM Samantha Martinez, [phone] Follow-up Instructions Return in about 4 weeks (around 10/8/2018) for medication evalution. Upcoming Health Maintenance Date Due DTaP/Tdap/Td series (1 - Tdap) 3/14/1980 Influenza Age 5 to Adult 8/1/2018 MEDICARE YEARLY EXAM 4/17/2019 COLONOSCOPY 6/11/2024 Allergies as of 9/7/2018  Review Complete On: 9/7/2018 By: Jasmin Torres LPN Severity Noted Reaction Type Reactions Neurontin [Gabapentin]  06/11/2014    Other (comments) Upset stomach Current Immunizations  Reviewed on 9/6/2016 Name Date Influenza Vaccine (Quad) PF  Incomplete, 9/6/2016 11:52 AM, 11/6/2015  2:37 PM  
 Influenza Vaccine PF 11/14/2013  1:31 PM  
 Pneumococcal Polysaccharide (PPSV-23) 2/5/2016 Not reviewed this visit You Were Diagnosed With   
  
 Codes Comments Anxiety    -  Primary ICD-10-CM: F41.9 ICD-9-CM: 300.00 Encounter for immunization     ICD-10-CM: W86 ICD-9-CM: V03.89 Essential hypertension     ICD-10-CM: I10 
ICD-9-CM: 401.9 Status post right hip replacement     ICD-10-CM: A97.730 ICD-9-CM: V43.64 Right arm pain     ICD-10-CM: Y45.444 ICD-9-CM: 729.5 Vitals BP Pulse Temp Resp Height(growth percentile) Weight(growth percentile) 142/71 (BP 1 Location: Right arm, BP Patient Position: Sitting) 63 96.5 °F (35.8 °C) (Oral) 20 5' 7\" (1.702 m) 160 lb (72.6 kg) SpO2 BMI Smoking Status 97% 25.06 kg/m2 Current Every Day Smoker Vitals History BMI and BSA Data Body Mass Index Body Surface Area 25.06 kg/m 2 1.85 m 2 Preferred Pharmacy Pharmacy Name Phone [facility] [address], [address] [facility] [phone] Your Updated Medication List  
  
   
This list is accurate as of 9/7/18  9:31 AM.  Always use your most recent med list.  
  
  
  
  
 albuterol 90 mcg/actuation inhaler Commonly known as:  PROVENTIL HFA, VENTOLIN HFA, PROAIR HFA Take 2 Puffs by inhalation every six (6) hours as needed for Wheezing or Shortness of Breath. amLODIPine 5 mg tablet Commonly known as:  [address] TAKE ONE TABLET BY MOUTH ONCE DAILY  
  
 citalopram 10 mg tablet Commonly known as:  Algernon Kotyk Take 1 Tab by mouth every evening. Indications: Generalized Anxiety Disorder  
  
 fluorouracil 5 % chemo cream  
Commonly known as:  EFUDEX Apply  to affected area two (2) times a day. fluticasone 44 mcg/actuation inhaler Commonly known as:  FLOVENT HFA Take 1 Puff by inhalation two (2) times a day. Indications: MAINTENANCE THERAPY FOR ASTHMA LORazepam 1 mg tablet Commonly known as:  ATIVAN Take 1 Tab by mouth every eight (8) hours as needed for Anxiety. Max Daily Amount: 3 mg.  
  
 losartan-hydroCHLOROthiazide 50-12.5 mg per tablet Commonly known as:  HYZAAR Take 1 Tab by mouth daily. sildenafil citrate 100 mg tablet Commonly known as:  VIAGRA Take 1 Tab by mouth as needed. tiZANidine 4 mg tablet Commonly known as:  Selinda Lovings TAKE ONE TABLET BY MOUTH EVERY 6 HOURS AS NEEDED Prescriptions Printed Refills LORazepam (ATIVAN) 1 mg tablet 2 Sig: Take 1 Tab by mouth every eight (8) hours as needed for Anxiety. Max Daily Amount: 3 mg. Class: Print Route: Oral  
  
Prescriptions Sent to Pharmacy Refills  
 citalopram (CELEXA) 10 mg tablet 3 Sig: Take 1 Tab by mouth every evening. Indications: Generalized Anxiety Disorder Class: Normal  
 Pharmacy: [address], [address] Ph #: [phone]  Route: Oral  
 amLODIPine (NORVASC) 5 mg tablet 11 Sig: TAKE ONE TABLET BY MOUTH ONCE DAILY Class: Normal  
 Pharmacy: [facility] [address] ALLY MCGARRY [address], [address] Ph #: [phone]  
 losartan-hydroCHLOROthiazide (HYZAAR) 50-12.5 mg per tablet 11 Sig: Take 1 Tab by mouth daily. Class: Normal  
 Pharmacy: [facility] DR ALLY ARTNAYAARELIS [address], [address] Ph #: [phone] Route: Oral  
  
We Performed the Following INFLUENZA VIRUS VAC QUAD,SPLIT,PRESV FREE SYRINGE IM [id number] CPT(R)] Follow-up Instructions Return in about 4 weeks (around 10/8/2018) for medication evalution. Patient Instructions Shoulder Bursitis: Exercises Your Care Instructions Here are some examples of typical rehabilitation exercises for your condition. Start each exercise slowly. Ease off the exercise if you start to have pain. Your doctor or physical therapist will tell you when you can start these exercises and which ones will work best for you. How to do the exercises Posterior stretching exercise 1. Hold the elbow of your injured arm with your other hand. 2. Use your hand to pull your injured arm gently up and across your body. You will feel a gentle stretch across the back of your injured shoulder. 3. Hold for at least 15 to 30 seconds. Then slowly lower your arm. 4. Repeat 2 to 4 times. Up-the-back stretch Your doctor or physical therapist may want you to wait to do this stretch until you have regained most of your range of motion and strength. You can do this stretch in different ways. Hold any of these stretches for at least 15 to 30 seconds. Repeat them 2 to 4 times. 1. Put your hand in your back pocket. Let it rest there to stretch your shoulder. 2. With your other hand, hold your injured arm (palm outward) behind your back by the wrist. Pull your arm up gently to stretch your shoulder. 3. Next, put a towel over your other shoulder.  Put the hand of your injured arm behind your back. Now hold the back end of the towel. With the other hand, hold the front end of the towel in front of your body. Pull gently on the front end of the towel. This will bring your hand farther up your back to stretch your shoulder. Overhead stretch 1. Standing about an arm's length away, grasp onto a solid surface. You could use a countertop, a doorknob, or the back of a sturdy chair. 2. With your knees slightly bent, bend forward with your arms straight. Lower your upper body, and let your shoulders stretch. 3. As your shoulders are able to stretch farther, you may need to take a step or two backward. 4. Hold for at least 15 to 30 seconds. Then stand up and relax. If you had stepped back during your stretch, step forward so you can keep your hands on the solid surface. 5. Repeat 2 to 4 times. Shoulder flexion (lying down) To make a wand for this exercise, use a piece of PVC pipe or a broom handle with the broom removed. Make the wand about a foot wider than your shoulders. 1. Lie on your back, holding a wand with both hands. Your palms should face down as you hold the wand. 2. Keeping your elbows straight, slowly raise your arms over your head. Raise them until you feel a stretch in your shoulders, upper back, and chest. 
3. Hold for 15 to 30 seconds. 4. Repeat 2 to 4 times. Shoulder rotation (lying down) To make a wand for this exercise, use a piece of PVC pipe or a broom handle with the broom removed. Make the wand about a foot wider than your shoulders. 1. Lie on your back. Hold a wand with both hands with your elbows bent and palms up. 2. Keep your elbows close to your body, and move the wand across your body toward the sore arm. 3. Hold for 8 to 12 seconds. 4. Repeat 2 to 4 times. Shoulder blade squeeze 1. Stand with your arms at your sides, and squeeze your shoulder blades together. Do not raise your shoulders up as you squeeze. 2. Hold 6 seconds. 3. Repeat 8 to 12 times. Shoulder flexor and extensor exercise These are isometric exercises. That means you contract your muscles without actually moving. 1. Push forward (flex): Stand facing a wall or doorjamb, about 6 inches or less back. Hold your injured arm against your body. Make a closed fist with your thumb on top. Then gently push your hand forward into the wall with about 25% to 50% of your strength. Don't let your body move backward as you push. Hold for about 6 seconds. Relax for a few seconds. Repeat 8 to 12 times. 2. Push backward (extend): Stand with your back flat against a wall. Your upper arm should be against the wall, with your elbow bent 90 degrees (your hand straight ahead). Push your elbow gently back against the wall with about 25% to 50% of your strength. Don't let your body move forward as you push. Hold for about 6 seconds. Relax for a few seconds. Repeat 8 to 12 times. Scapular exercise: Wall push-ups This exercise is best done with your fingers somewhat turned out, rather than straight up and down. 1. Stand facing a wall, about 12 inches to 18 inches away. 2. Place your hands on the wall at shoulder height. 3. Slowly bend your elbows and bring your face to the wall. Keep your back and hips straight. 4. Push back to where you started. 5. Repeat 8 to 12 times. 6. When you can do this exercise against a wall comfortably, you can try it against a counter. You can then slowly progress to the end of a couch, then to a sturdy chair, and finally to the floor. Scapular exercise: Retraction For this exercise, you will need elastic exercise material, such as surgical tubing or Thera-Band. 1. Put the band around a solid object at about waist level. (A bedpost will work well.) Each hand should hold an end of the band. 2. With your elbows at your sides and bent to 90 degrees, pull the band back. Your shoulder blades should move toward each other.  Then move your arms back where you started. 3. Repeat 8 to 12 times. 4. If you have good range of motion in your shoulders, try this exercise with your arms lifted out to the sides. Keep your elbows at a 90-degree angle. Raise the elastic band up to about shoulder level. Pull the band back to move your shoulder blades toward each other. Then move your arms back where you started. Internal rotator strengthening exercise 1. Start by tying a piece of elastic exercise material to a doorknob. You can use surgical tubing or Thera-Band. 2. Stand or sit with your shoulder relaxed and your elbow bent 90 degrees. Your upper arm should rest comfortably against your side. Squeeze a rolled towel between your elbow and your body for comfort. This will help keep your arm at your side. 3. Hold one end of the elastic band in the hand of the painful arm. 4. Slowly rotate your forearm toward your body until it touches your belly. Slowly move it back to where you started. 5. Keep your elbow and upper arm firmly tucked against the towel roll or at your side. 6. Repeat 8 to 12 times. External rotator strengthening exercise 1. Start by tying a piece of elastic exercise material to a doorknob. You can use surgical tubing or Thera-Band. (You may also hold one end of the band in each hand.) 2. Stand or sit with your shoulder relaxed and your elbow bent 90 degrees. Your upper arm should rest comfortably against your side. Squeeze a rolled towel between your elbow and your body for comfort. This will help keep your arm at your side. 3. Hold one end of the elastic band with the hand of the painful arm. 4. Start with your forearm across your belly. Slowly rotate the forearm out away from your body. Keep your elbow and upper arm tucked against the towel roll or the side of your body until you begin to feel tightness in your shoulder. Slowly move your arm back to where you started. 5. Repeat 8 to 12 times. Follow-up care is a key part of your treatment and safety. Be sure to make and go to all appointments, and call your doctor if you are having problems. It's also a good idea to know your test results and keep a list of the medicines you take. Where can you learn more? Go to http://carolina-misbah.info/. Enter L087 in the search box to learn more about \"Shoulder Bursitis: Exercises. \" Current as of: November 29, 2017 Content Version: 11.7 © 3737-1425 [employer] Website. Care instructions adapted under license by [employer] (which disclaims liability or warranty for this information). If you have questions about a medical condition or this instruction, always ask your healthcare professional. [address] any warranty or liability for your use of this information. Introducing [facility] & HEALTH SERVICES! [employer] introduces Personal Style Finder patient portal. Now you can access parts of your medical record, email your doctor's office, and request medication refills online. 1. In your internet browser, go to [email or web address] 2. Click on the First Time User? Click Here link in the Sign In box. You will see the New Member Sign Up page. 3. Enter your Personal Style Finder Access Code exactly as it appears below. You will not need to use this code after youve completed the sign-up process. If you do not sign up before the expiration date, you must request a new code. · Personal Style Finder Access Code: [email or web address] Expires: 12/6/2018  8:46 AM 
 
4. Enter the last four digits of your Social Security Number (xxxx) and Date of Birth (mm/dd/yyyy) as indicated and click Submit. You will be taken to the next sign-up page. 5. Create a OttoLikes Labst ID. This will be your Personal Style Finder login ID and cannot be changed, so think of one that is secure and easy to remember. 6. Create a [employer] Labst password. You can change your password at any time. 7. Enter your Password Reset Question and Answer. This can be used at a later time if you forget your password. 8. Enter your e-mail address. You will receive e-mail notification when new information is available in [address] 9. Click Sign Up. You can now view and download portions of your medical record. 10. Click the Download Summary menu link to download a portable copy of your medical information. If you have questions, please visit the Frequently Asked Questions section of the uma information technology website. Remember, uma information technology is NOT to be used for urgent needs. For medical emergencies, dial 911. Now available from your iPhone and Android! Please provide this summary of care documentation to your next provider. Your primary care clinician is listed as Samantha Martinez. If you have any questions after today's visit, please call [phone].

## 2018-10-08 ENCOUNTER — OFFICE VISIT (OUTPATIENT)
Dept: FAMILY MEDICINE CLINIC | Age: 58
End: 2018-10-08

## 2018-10-08 VITALS
WEIGHT: 161.4 LBS | OXYGEN SATURATION: 96 % | RESPIRATION RATE: 17 BRPM | HEART RATE: 61 BPM | DIASTOLIC BLOOD PRESSURE: 63 MMHG | HEIGHT: 67 IN | SYSTOLIC BLOOD PRESSURE: 134 MMHG | BODY MASS INDEX: 25.33 KG/M2

## 2018-10-08 DIAGNOSIS — J45.40 MODERATE PERSISTENT ASTHMA WITHOUT COMPLICATION: ICD-10-CM

## 2018-10-08 DIAGNOSIS — M54.2 CERVICAL PAIN: ICD-10-CM

## 2018-10-08 DIAGNOSIS — F41.9 ANXIETY: ICD-10-CM

## 2018-10-08 DIAGNOSIS — I10 ESSENTIAL HYPERTENSION: ICD-10-CM

## 2018-10-08 DIAGNOSIS — L30.1 DYSHIDROSIS: Primary | ICD-10-CM

## 2018-10-08 RX ORDER — LORAZEPAM 1 MG/1
1 TABLET ORAL
Qty: 30 TAB | Refills: 2 | Status: CANCELLED | OUTPATIENT
Start: 2018-10-08

## 2018-10-08 RX ORDER — TIZANIDINE 4 MG/1
TABLET ORAL
Qty: 40 TAB | Refills: 3 | Status: ON HOLD | OUTPATIENT
Start: 2018-10-08 | End: 2018-11-20

## 2018-10-08 RX ORDER — FLUOCINONIDE 0.5 MG/G
OINTMENT TOPICAL 2 TIMES DAILY
Qty: 30 G | Refills: 0 | Status: ON HOLD | OUTPATIENT
Start: 2018-10-08 | End: 2018-11-20

## 2018-10-08 RX ORDER — ALBUTEROL SULFATE 90 UG/1
2 AEROSOL, METERED RESPIRATORY (INHALATION)
Qty: 1 INHALER | Refills: 12 | Status: SHIPPED | OUTPATIENT
Start: 2018-10-08 | End: 2020-02-03

## 2018-10-08 RX ORDER — AMLODIPINE BESYLATE 5 MG/1
TABLET ORAL
Qty: 30 TAB | Refills: 11 | Status: SHIPPED | OUTPATIENT
Start: 2018-10-08 | End: 2019-10-21 | Stop reason: SDUPTHER

## 2018-10-08 NOTE — MR AVS SNAPSHOT
[address] [place] [id number] 
[phone] Patient: Marni Purdy MRN: [id number] [id number] Visit Information Date & Time Provider Department Dept. Phone Encounter #  
 10/8/2018 11:45 AM Minerva Aguilar, [address] [facility] [phone] Follow-up Instructions Return in about 2 months (around 12/8/2018) for medication evaluation. Upcoming Health Maintenance Date Due DTaP/Tdap/Td series (1 - Tdap) 3/14/1980 Shingrix Vaccine Age 50> (1 of 2) 3/14/2009 MEDICARE YEARLY EXAM 4/17/2019 COLONOSCOPY 6/11/2024 Allergies as of 10/8/2018  Review Complete On: 10/8/2018 By: Minerva Aguilar MD  
  
 Severity Noted Reaction Type Reactions Neurontin [Gabapentin]  06/11/2014    Other (comments) Upset stomach Current Immunizations  Reviewed on 9/6/2016 Name Date Influenza Vaccine (Quad) PF 9/7/2018, 9/6/2016 11:52 AM, 11/6/2015  2:37 PM  
 Influenza Vaccine PF 11/14/2013  1:31 PM  
 Pneumococcal Polysaccharide (PPSV-23) 2/5/2016 Not reviewed this visit You Were Diagnosed With   
  
 Codes Comments Dyshidrosis    -  Primary ICD-10-CM: L30.1 ICD-9-CM: 705.81 Essential hypertension     ICD-10-CM: I10 
ICD-9-CM: 401.9 Moderate persistent asthma without complication     KID-56-YQ: J45.40 ICD-9-CM: 493.90 Anxiety     ICD-10-CM: F41.9 ICD-9-CM: 300.00 Cervical pain     ICD-10-CM: M54.2 ICD-9-CM: 723.1 Vitals BP Pulse Resp Height(growth percentile) Weight(growth percentile) SpO2  
 134/63 (BP 1 Location: Right arm, BP Patient Position: Sitting) 61 17 5' 7\" (1.702 m) 161 lb 6.4 oz (73.2 kg) 96% BMI Smoking Status 25.28 kg/m2 Current Every Day Smoker BMI and BSA Data Body Mass Index Body Surface Area  
 25.28 kg/m 2 1.86 m 2 Preferred Pharmacy Pharmacy Name Phone [address], [address] [phone] Your Updated Medication List  
  
   
This list is accurate as of 10/8/18 12:50 PM.  Always use your most recent med list.  
  
  
  
  
 albuterol 90 mcg/actuation inhaler Commonly known as:  PROVENTIL HFA, VENTOLIN HFA, PROAIR HFA Take 2 Puffs by inhalation every six (6) hours as needed for Wheezing or Shortness of Breath. amLODIPine 5 mg tablet Commonly known as:  Lona Pageramin TAKE ONE TABLET BY MOUTH ONCE DAILY  
  
 citalopram 10 mg tablet Commonly known as:  Carmelo Ferguson Take 1 Tab by mouth every evening. Indications: Generalized Anxiety Disorder  
  
 fluocinoNIDE 0.05 % ointment Commonly known as:  LIDEX Apply  to affected area two (2) times a day. fluorouracil 5 % chemo cream  
Commonly known as:  EFUDEX Apply  to affected area two (2) times a day. fluticasone 44 mcg/actuation inhaler Commonly known as:  FLOVENT HFA Take 1 Puff by inhalation two (2) times a day. Indications: MAINTENANCE THERAPY FOR ASTHMA LORazepam 1 mg tablet Commonly known as:  ATIVAN Take 1 Tab by mouth every eight (8) hours as needed for Anxiety. Max Daily Amount: 3 mg.  
  
 sildenafil citrate 100 mg tablet Commonly known as:  VIAGRA Take 1 Tab by mouth as needed. tiZANidine 4 mg tablet Commonly known as:  Johnson Settle TAKE ONE TABLET BY MOUTH EVERY 6 HOURS AS NEEDED Prescriptions Sent to Pharmacy Refills  
 amLODIPine (NORVASC) 5 mg tablet 11 Sig: TAKE ONE TABLET BY MOUTH ONCE DAILY Class: Normal  
 Pharmacy: [facility] [address] ALLY MCGARRY [address], [address] Ph #: [phone]  
 albuterol (PROVENTIL HFA, VENTOLIN HFA, PROAIR HFA) 90 mcg/actuation inhaler 12 Sig: Take 2 Puffs by inhalation every six (6) hours as needed for Wheezing or Shortness of Breath. Class: Normal  
 Pharmacy: [facility] [address]CG[address]Y [address], [address] Ph #: [phone] Route: Inhalation  
 tiZANidine (ZANAFLEX) 4 mg tablet 3 Sig: TAKE ONE TABLET BY MOUTH EVERY 6 HOURS AS NEEDED Class: Normal  
 Pharmacy: [facility] [address], [address] Ph #: [phone]  
 fluocinoNIDE (LIDEX) 0.05 % ointment 0 Sig: Apply  to affected area two (2) times a day. Class: Normal  
 Pharmacy: [facility] [address], [address] Ph #: [phone] Route: Topical  
  
Follow-up Instructions Return in about 2 months (around 12/8/2018) for medication evaluation. Introducing [facility] & HEALTH SERVICES! [employer] introduces ElationEMR patient portal. Now you can access parts of your medical record, email your doctor's office, and request medication refills online. 1. In your internet browser, go to [email or web address] 2. Click on the First Time User? Click Here link in the Sign In box. You will see the New Member Sign Up page. 3. Enter your ElationEMR Access Code exactly as it appears below. You will not need to use this code after youve completed the sign-up process. If you do not sign up before the expiration date, you must request a new code. · ElationEMR Access Code: [email or web address] Expires: 12/6/2018  8:46 AM 
 
4. Enter the last four digits of your Social Security Number (xxxx) and Date of Birth (mm/dd/yyyy) as indicated and click Submit. You will be taken to the next sign-up page. 5. Create a TÃ£ Em BÃ©t ID. This will be your ElationEMR login ID and cannot be changed, so think of one that is secure and easy to remember. 6. Create a ElationEMR password. You can change your password at any time. 7. Enter your Password Reset Question and Answer. This can be used at a later time if you forget your password. 8. Enter your e-mail address. You will receive e-mail notification when new information is available in [address] 9. Click Sign Up. You can now view and download portions of your medical record. 10. Click the Download Summary menu link to download a portable copy of your medical information. If you have questions, please visit the Frequently Asked Questions section of the Fruit[employer]ll website. Remember, Fruitfulll is NOT to be used for urgent needs. For medical emergencies, dial 911. Now available from your iPhone and Android! Please provide this summary of care documentation to your next provider. Your primary care clinician is listed as Obdulia Wright. If you have any questions after today's visit, please call [phone].

## 2018-10-08 NOTE — PROGRESS NOTES
Ina Herrera is a 60 y.o male that is present in the office for a routine appointment for medication evaluations. Patient stated that he discontinued the losartan due to medication causing dizziness. 1. Have you been to the ER, urgent care clinic since your last visit? Hospitalized since your last visit? No    2. Have you seen or consulted any other health care providers outside of the [address] [place] [address] since your last visit? Include any pap smears or colon screening.  No     Health Maintenance Due   Topic Date Due    DTaP/Tdap/Td series (1 - Tdap) 03/14/1980    Shingrix Vaccine Age 50> (1 of 2) 03/14/2009

## 2018-10-08 NOTE — PROGRESS NOTES
Na Abdul is a 60 y.o.  male and presents with    Chief Complaint   Patient presents with    Medication Evaluation     Subjective: Anxiety Review:  Mr. Danay Castañeda is seen for anxiety disorder, panic attacks. Current treatment includes buspar, Ativan and no other therapies. Ongoing symptoms include: palpitations, sweating, shortness of breath, dizziness, paresthesias, insomnia, racing thoughts, psychomotor agitation, feelings of losing control, difficulty concentrating. Patient denies: suicidal ideation. Reported side effects from the treatment: GI disturbancewith his buspar.     He is also here for f/u hypertension. he reports that his blood pressure medication caused dizziness. He is taking medication as prescribed. He has low back pain on the right side. His hips cause pain intermittently. He c/o rash between his fingers which has been present for 2 weeks. He has been working in trees. He has not used any treatment other than petroleum jelly. Patient Active Problem List   Diagnosis Code    Asthma J45.909    Chronic pain G89.29    Anxiety F41.9    Bilateral primary osteoarthritis of hip M16.0    Advance care planning Z71.89    Essential hypertension I10    Status post right hip replacement Z96.641     Patient Active Problem List    Diagnosis Date Noted    Essential hypertension 09/07/2018    Status post right hip replacement 09/07/2018    Advance care planning 01/26/2017    Bilateral primary osteoarthritis of hip 07/14/2016    Anxiety 05/03/2016    Chronic pain 03/07/2016    Asthma 11/12/2013     Current Outpatient Prescriptions   Medication Sig Dispense Refill    citalopram (CELEXA) 10 mg tablet Take 1 Tab by mouth every evening. Indications: Generalized Anxiety Disorder 90 Tab 3    LORazepam (ATIVAN) 1 mg tablet Take 1 Tab by mouth every eight (8) hours as needed for Anxiety.  Max Daily Amount: 3 mg. 30 Tab 2    amLODIPine (NORVASC) 5 mg tablet TAKE ONE TABLET BY MOUTH ONCE DAILY 30 Tab 11    tiZANidine (ZANAFLEX) 4 mg tablet TAKE ONE TABLET BY MOUTH EVERY 6 HOURS AS NEEDED 40 Tab 3    sildenafil citrate (VIAGRA) 100 mg tablet Take 1 Tab by mouth as needed. 12 Tab 1    albuterol (PROVENTIL HFA, VENTOLIN HFA, PROAIR HFA) 90 mcg/actuation inhaler Take 2 Puffs by inhalation every six (6) hours as needed for Wheezing or Shortness of Breath. 1 Inhaler 12    fluticasone (FLOVENT HFA) 44 mcg/actuation inhaler Take 1 Puff by inhalation two (2) times a day. Indications: MAINTENANCE THERAPY FOR ASTHMA 1 Inhaler 2    fluorouracil (EFUDEX) 5 % chemo cream Apply  to affected area two (2) times a day. 40 g 0    losartan-hydroCHLOROthiazide (HYZAAR) 50-12.5 mg per tablet Take 1 Tab by mouth daily.  30 Tab 11     Allergies   Allergen Reactions    Neurontin [Gabapentin] Other (comments)     Upset stomach     Past Medical History:   Diagnosis Date    Anxiety     Arthritis     osteo-hips    Asthma     as a child only    Bilateral primary osteoarthritis of hip 7/14/2016    Broken rib april 2013    3 broken ribs s/p fall with hemothorax    Chronic pain     hips, legs and knees    Hypertension     Psychiatric disorder     anxiety     Past Surgical History:   Procedure Laterality Date    HX HERNIA REPAIR Left     HX LUMBAR DISKECTOMY      HX MOHS PROCEDURES      (R) rotator cuff     Family History   Problem Relation Age of Onset    Dementia Mother     Cancer Father 61     Social History   Substance Use Topics    Smoking status: Current Every Day Smoker     Packs/day: 0.25     Years: 30.00    Smokeless tobacco: Never Used      Comment: pt will try to quit smoking    Alcohol use No      Comment: quit 1/2016       ROS   General ROS: negative for - chills or fever  Ophthalmic ROS: positive for - uses glasses  ENT ROS: negative for - headaches, nasal discharge or sore throat  Endocrine ROS: negative for - polydipsia/polyuria or temperature intolerance  Respiratory ROS: no cough, shortness of breath, or wheezing  Cardiovascular ROS: no chest pain or dyspnea on exertion  Gastrointestinal ROS: no abdominal pain, change in bowel habits, or black or bloody stools  Genito-Urinary ROS: no dysuria, trouble voiding, or hematuria  Musculoskeletal ROS: positive for - pain in arm - left  Neurological ROS: negative for - numbness/tingling or weakness  Dermatological ROS: negative for - rash or skin lesion changes       All other systems reviewed and are negative. Objective:  Vitals:    10/08/18 1159   BP: 134/63   Pulse: 61   Resp: 17   SpO2: 96%   Weight: 161 lb 6.4 oz (73.2 kg)   Height: 5' 7\" (1.702 m)   PainSc:   0 - No pain     alert, well appearing, and in no distress, oriented to person, place, and time and normal appearing weight  Mental status - normal mood, behavior, speech, dress, motor activity, and thought processes  Chest - clear to auscultation, no wheezes, rales or rhonchi, symmetric air entry  Heart - normal rate, regular rhythm, normal S1, S2, no murmurs, rubs, clicks or gallops  Neurological - cranial nerves II through XII intact,  normal gait and station  Musculoskeletal - abnormal exam of left arm with ttp around bicep    Assessment/Plan:    1. Essential hypertension  Goal <130/80; borderline controlled; continue treatment  - amLODIPine (NORVASC) 5 mg tablet; TAKE ONE TABLET BY MOUTH ONCE DAILY  Dispense: 30 Tab; Refill: 11    2. Moderate persistent asthma without complication  Continue inhaled therapy; congratulated pt on smoking cessation  - albuterol (PROVENTIL HFA, VENTOLIN HFA, PROAIR HFA) 90 mcg/actuation inhaler; Take 2 Puffs by inhalation every six (6) hours as needed for Wheezing or Shortness of Breath. Dispense: 1 Inhaler; Refill: 12    3. Anxiety  Continue bzd with caution    4.  Cervical pain  Muscle relaxer prescribed and exercises demonstrated  - tiZANidine (ZANAFLEX) 4 mg tablet; TAKE ONE TABLET BY MOUTH EVERY 6 HOURS AS NEEDED  Dispense: 40 Tab; Refill: 3      Lab review: no lab studies available for review at time of visit      I have discussed the diagnosis with the patient and the intended plan as seen in the above orders. The patient has received an after-visit summary and questions were answered concerning future plans. I have discussed medication side effects and warnings with the patient as well. I have reviewed the plan of care with the patient, accepted their input and they are in agreement with the treatment goals. Follow-up Disposition:  Return in about 2 months (around 12/8/2018) for medication evaluation.

## 2018-11-19 ENCOUNTER — APPOINTMENT (OUTPATIENT)
Dept: MRI IMAGING | Age: 58
DRG: 066 | End: 2018-11-19
Attending: EMERGENCY MEDICINE
Payer: MEDICARE

## 2018-11-19 ENCOUNTER — APPOINTMENT (OUTPATIENT)
Dept: CT IMAGING | Age: 58
DRG: 066 | End: 2018-11-19
Attending: EMERGENCY MEDICINE
Payer: MEDICARE

## 2018-11-19 ENCOUNTER — APPOINTMENT (OUTPATIENT)
Dept: GENERAL RADIOLOGY | Age: 58
DRG: 066 | End: 2018-11-19
Attending: EMERGENCY MEDICINE
Payer: MEDICARE

## 2018-11-19 ENCOUNTER — HOSPITAL ENCOUNTER (INPATIENT)
Age: 58
LOS: 1 days | Discharge: HOME | DRG: 066 | End: 2018-11-20
Attending: EMERGENCY MEDICINE | Admitting: HOSPITALIST
Payer: MEDICARE

## 2018-11-19 DIAGNOSIS — R47.02 DYSPHASIA: Primary | ICD-10-CM

## 2018-11-19 PROBLEM — G45.9 TIA (TRANSIENT ISCHEMIC ATTACK): Status: ACTIVE | Noted: 2018-11-19

## 2018-11-19 LAB
AMPHET UR QL SCN: NEGATIVE
ANION GAP SERPL CALC-SCNC: 6 MMOL/L (ref 3–18)
APPEARANCE UR: CLEAR
BARBITURATES UR QL SCN: NEGATIVE
BASOPHILS # BLD: 0.1 K/UL (ref 0–0.1)
BASOPHILS NFR BLD: 1 % (ref 0–2)
BENZODIAZ UR QL: NEGATIVE
BILIRUB UR QL: NEGATIVE
BUN SERPL-MCNC: 20 MG/DL (ref 7–18)
BUN/CREAT SERPL: 24 (ref 12–20)
CALCIUM SERPL-MCNC: 9.1 MG/DL (ref 8.5–10.1)
CANNABINOIDS UR QL SCN: NEGATIVE
CHLORIDE SERPL-SCNC: 108 MMOL/L (ref 100–108)
CO2 SERPL-SCNC: 23 MMOL/L (ref 21–32)
COCAINE UR QL SCN: NEGATIVE
COLOR UR: YELLOW
CREAT SERPL-MCNC: 0.82 MG/DL (ref 0.6–1.3)
DIFFERENTIAL METHOD BLD: ABNORMAL
EOSINOPHIL # BLD: 0.1 K/UL (ref 0–0.4)
EOSINOPHIL NFR BLD: 1 % (ref 0–5)
ERYTHROCYTE [DISTWIDTH] IN BLOOD BY AUTOMATED COUNT: 14.2 % (ref 11.6–14.5)
GLUCOSE BLD STRIP.AUTO-MCNC: 176 MG/DL (ref 70–110)
GLUCOSE SERPL-MCNC: 102 MG/DL (ref 74–99)
GLUCOSE UR STRIP.AUTO-MCNC: NEGATIVE MG/DL
HCT VFR BLD AUTO: 54.6 % (ref 36–48)
HDSCOM,HDSCOM: NORMAL
HGB BLD-MCNC: 18.6 G/DL (ref 13–16)
HGB UR QL STRIP: NEGATIVE
INR PPP: 1 (ref 0.8–1.2)
KETONES UR QL STRIP.AUTO: NEGATIVE MG/DL
LEUKOCYTE ESTERASE UR QL STRIP.AUTO: NEGATIVE
LYMPHOCYTES # BLD: 2 K/UL (ref 0.9–3.6)
LYMPHOCYTES NFR BLD: 24 % (ref 21–52)
MCH RBC QN AUTO: 32.7 PG (ref 24–34)
MCHC RBC AUTO-ENTMCNC: 34.1 G/DL (ref 31–37)
MCV RBC AUTO: 96.1 FL (ref 74–97)
METHADONE UR QL: NEGATIVE
MONOCYTES # BLD: 0.7 K/UL (ref 0.05–1.2)
MONOCYTES NFR BLD: 8 % (ref 3–10)
NEUTS SEG # BLD: 5.3 K/UL (ref 1.8–8)
NEUTS SEG NFR BLD: 66 % (ref 40–73)
NITRITE UR QL STRIP.AUTO: NEGATIVE
OPIATES UR QL: NEGATIVE
PCP UR QL: NEGATIVE
PH UR STRIP: 5 [PH] (ref 5–8)
PLATELET # BLD AUTO: 206 K/UL (ref 135–420)
PMV BLD AUTO: 10.1 FL (ref 9.2–11.8)
POTASSIUM SERPL-SCNC: 4.2 MMOL/L (ref 3.5–5.5)
PROT UR STRIP-MCNC: NEGATIVE MG/DL
PROTHROMBIN TIME: 12.5 SEC (ref 11.5–15.2)
RBC # BLD AUTO: 5.68 M/UL (ref 4.7–5.5)
SODIUM SERPL-SCNC: 137 MMOL/L (ref 136–145)
SP GR UR REFRACTOMETRY: 1.01 (ref 1–1.03)
THROMBIN TIME: 17.6 SECS (ref 13.8–18.2)
UROBILINOGEN UR QL STRIP.AUTO: 0.2 EU/DL (ref 0.2–1)
WBC # BLD AUTO: 8.1 K/UL (ref 4.6–13.2)

## 2018-11-19 PROCEDURE — 80307 DRUG TEST PRSMV CHEM ANLYZR: CPT

## 2018-11-19 PROCEDURE — 82962 GLUCOSE BLOOD TEST: CPT

## 2018-11-19 PROCEDURE — 86141 C-REACTIVE PROTEIN HS: CPT

## 2018-11-19 PROCEDURE — 74011000258 HC RX REV CODE- 258: Performed by: HOSPITALIST

## 2018-11-19 PROCEDURE — 84439 ASSAY OF FREE THYROXINE: CPT

## 2018-11-19 PROCEDURE — 74011250636 HC RX REV CODE- 250/636: Performed by: EMERGENCY MEDICINE

## 2018-11-19 PROCEDURE — 93005 ELECTROCARDIOGRAM TRACING: CPT

## 2018-11-19 PROCEDURE — 84443 ASSAY THYROID STIM HORMONE: CPT

## 2018-11-19 PROCEDURE — 85652 RBC SED RATE AUTOMATED: CPT

## 2018-11-19 PROCEDURE — 85025 COMPLETE CBC W/AUTO DIFF WBC: CPT

## 2018-11-19 PROCEDURE — 84481 FREE ASSAY (FT-3): CPT

## 2018-11-19 PROCEDURE — 74011250637 HC RX REV CODE- 250/637: Performed by: HOSPITALIST

## 2018-11-19 PROCEDURE — 70544 MR ANGIOGRAPHY HEAD W/O DYE: CPT

## 2018-11-19 PROCEDURE — 80061 LIPID PANEL: CPT

## 2018-11-19 PROCEDURE — 65660000000 HC RM CCU STEPDOWN

## 2018-11-19 PROCEDURE — 70450 CT HEAD/BRAIN W/O DYE: CPT

## 2018-11-19 PROCEDURE — 85610 PROTHROMBIN TIME: CPT

## 2018-11-19 PROCEDURE — 83036 HEMOGLOBIN GLYCOSYLATED A1C: CPT

## 2018-11-19 PROCEDURE — 70551 MRI BRAIN STEM W/O DYE: CPT

## 2018-11-19 PROCEDURE — 74011250637 HC RX REV CODE- 250/637: Performed by: EMERGENCY MEDICINE

## 2018-11-19 PROCEDURE — 85670 THROMBIN TIME PLASMA: CPT

## 2018-11-19 PROCEDURE — 94762 N-INVAS EAR/PLS OXIMTRY CONT: CPT

## 2018-11-19 PROCEDURE — 81003 URINALYSIS AUTO W/O SCOPE: CPT

## 2018-11-19 PROCEDURE — 99285 EMERGENCY DEPT VISIT HI MDM: CPT

## 2018-11-19 PROCEDURE — 71045 X-RAY EXAM CHEST 1 VIEW: CPT

## 2018-11-19 PROCEDURE — 70547 MR ANGIOGRAPHY NECK W/O DYE: CPT

## 2018-11-19 PROCEDURE — 80048 BASIC METABOLIC PNL TOTAL CA: CPT

## 2018-11-19 RX ORDER — PANTOPRAZOLE SODIUM 40 MG/1
40 GRANULE, DELAYED RELEASE ORAL EVERY 12 HOURS
Status: DISCONTINUED | OUTPATIENT
Start: 2018-11-19 | End: 2018-11-20 | Stop reason: HOSPADM

## 2018-11-19 RX ORDER — ONDANSETRON 2 MG/ML
2 INJECTION INTRAMUSCULAR; INTRAVENOUS
Status: DISCONTINUED | OUTPATIENT
Start: 2018-11-19 | End: 2018-11-20 | Stop reason: HOSPADM

## 2018-11-19 RX ORDER — METHADONE HYDROCHLORIDE 10 MG/1
80 TABLET ORAL
COMMUNITY
End: 2018-11-19 | Stop reason: CLARIF

## 2018-11-19 RX ORDER — GUAIFENESIN 100 MG/5ML
324 LIQUID (ML) ORAL
Status: COMPLETED | OUTPATIENT
Start: 2018-11-19 | End: 2018-11-19

## 2018-11-19 RX ORDER — AMOXICILLIN 250 MG
2 CAPSULE ORAL
Status: DISCONTINUED | OUTPATIENT
Start: 2018-11-19 | End: 2018-11-20 | Stop reason: HOSPADM

## 2018-11-19 RX ORDER — ASPIRIN 325 MG
325 TABLET ORAL DAILY
Status: DISCONTINUED | OUTPATIENT
Start: 2018-11-20 | End: 2018-11-20 | Stop reason: HOSPADM

## 2018-11-19 RX ORDER — ATORVASTATIN CALCIUM 40 MG/1
80 TABLET, FILM COATED ORAL
Status: DISCONTINUED | OUTPATIENT
Start: 2018-11-19 | End: 2018-11-20 | Stop reason: HOSPADM

## 2018-11-19 RX ORDER — CITALOPRAM 20 MG/1
10 TABLET, FILM COATED ORAL EVERY EVENING
Status: DISCONTINUED | OUTPATIENT
Start: 2018-11-19 | End: 2018-11-20 | Stop reason: HOSPADM

## 2018-11-19 RX ORDER — AMLODIPINE BESYLATE 5 MG/1
5 TABLET ORAL DAILY
Status: DISCONTINUED | OUTPATIENT
Start: 2018-11-20 | End: 2018-11-20 | Stop reason: HOSPADM

## 2018-11-19 RX ORDER — DEXTROSE MONOHYDRATE AND SODIUM CHLORIDE 5; .9 G/100ML; G/100ML
0.75 INJECTION, SOLUTION INTRAVENOUS CONTINUOUS
Status: DISCONTINUED | OUTPATIENT
Start: 2018-11-19 | End: 2018-11-20 | Stop reason: HOSPADM

## 2018-11-19 RX ORDER — PANTOPRAZOLE SODIUM 40 MG/1
40 TABLET, DELAYED RELEASE ORAL EVERY 12 HOURS
Status: DISCONTINUED | OUTPATIENT
Start: 2018-11-19 | End: 2018-11-20 | Stop reason: HOSPADM

## 2018-11-19 RX ORDER — ACETAMINOPHEN 325 MG/1
650 TABLET ORAL
Status: DISCONTINUED | OUTPATIENT
Start: 2018-11-19 | End: 2018-11-20 | Stop reason: HOSPADM

## 2018-11-19 RX ORDER — LORAZEPAM 1 MG/1
1 TABLET ORAL
Status: DISCONTINUED | OUTPATIENT
Start: 2018-11-19 | End: 2018-11-20 | Stop reason: HOSPADM

## 2018-11-19 RX ORDER — ALBUTEROL SULFATE 0.83 MG/ML
2.5 SOLUTION RESPIRATORY (INHALATION)
Status: DISCONTINUED | OUTPATIENT
Start: 2018-11-19 | End: 2018-11-20 | Stop reason: HOSPADM

## 2018-11-19 RX ORDER — ACETAMINOPHEN 650 MG/1
650 SUPPOSITORY RECTAL
Status: DISCONTINUED | OUTPATIENT
Start: 2018-11-19 | End: 2018-11-20 | Stop reason: HOSPADM

## 2018-11-19 RX ORDER — BUDESONIDE 0.5 MG/2ML
500 INHALANT ORAL
Status: DISCONTINUED | OUTPATIENT
Start: 2018-11-19 | End: 2018-11-20 | Stop reason: HOSPADM

## 2018-11-19 RX ORDER — SODIUM CHLORIDE 9 MG/ML
100 INJECTION, SOLUTION INTRAVENOUS CONTINUOUS
Status: DISCONTINUED | OUTPATIENT
Start: 2018-11-19 | End: 2018-11-20 | Stop reason: HOSPADM

## 2018-11-19 RX ORDER — TIZANIDINE 4 MG/1
4 TABLET ORAL 4 TIMES DAILY
Status: DISCONTINUED | OUTPATIENT
Start: 2018-11-19 | End: 2018-11-20 | Stop reason: HOSPADM

## 2018-11-19 RX ADMIN — CITALOPRAM HYDROBROMIDE 10 MG: 20 TABLET ORAL at 22:45

## 2018-11-19 RX ADMIN — PANTOPRAZOLE SODIUM 40 MG: 40 TABLET, DELAYED RELEASE ORAL at 22:45

## 2018-11-19 RX ADMIN — SODIUM CHLORIDE 100 ML/HR: 900 INJECTION, SOLUTION INTRAVENOUS at 17:20

## 2018-11-19 RX ADMIN — ATORVASTATIN CALCIUM 80 MG: 40 TABLET, FILM COATED ORAL at 22:45

## 2018-11-19 RX ADMIN — ASPIRIN 81 MG 324 MG: 81 TABLET ORAL at 17:20

## 2018-11-19 RX ADMIN — TIZANIDINE 4 MG: 4 TABLET ORAL at 22:45

## 2018-11-19 RX ADMIN — DEXTROSE MONOHYDRATE AND SODIUM CHLORIDE 0.75 ML/KG/HR: 5; .9 INJECTION, SOLUTION INTRAVENOUS at 22:50

## 2018-11-19 NOTE — H&P
History and Physical 
 
Patient: Arvind Fraser               Sex: male          DOA: 2018 YOB: 1960      Age:  60 y.o.        LOS:  LOS: 1 day HPI:  
 
Arvind Fraser is a 60 y.o. male who presented to the ER, \"becasue I was dizzy and I was bumping into stuff. I also have trouble getting my words out. \"  The difficulty with speech began today. The dizziness has been over several days but seemed to be getting worse. He was seen in [address] [facility] in the ER and he was not a candidate for tPA. His dizziness improved, \"but I still have trouble with my words. It's not normal yet. \"  He will be admitted for ongoing management. Past Medical History:  
Diagnosis Date  Anxiety  Arthritis   
 osteo-hips  Asthma   
 as a child only  Bilateral primary osteoarthritis of hip 2016  Broken rib 2013  
 3 broken ribs s/p fall with hemothorax  Chronic pain   
 hips, legs and knees  Hypertension  Psychiatric disorder   
 anxiety Social History: 
 Tobacco use:  Patient has smoked for many years Alcohol use:  Patient uses alcohol occasionally Occupation:  Patient is on disability. He worked previously as a [employer]. Family History: Mother and Father both  with Cancer Review of Systems Constitutional:  No fever or weight loss HEENT:  No headache or visual changes Cardiovascular:  No chest pain or diaphoresis Respiratory:  No coughing, wheezing, or shortness of breath. GI:  No nausea or vomitting. No diarrhea :  No hematuria or dysuria Skin:  No rashes or moles Neuro:  Difficulty with speech and with muscle strength as above. Hematological:  No bruising or bleeding Endocrine:  No diabetes or thyroid disease Physical Exam:  
  
Visit Vitals /77 (BP 1 Location: Left arm, BP Patient Position: At rest) Pulse 60 Temp 97.6 °F (36.4 °C) Resp 16 Ht 5' 7\" (1.702 m) Wt 74.8 kg (165 lb) SpO2 96% BMI 25.84 kg/m² Physical Exam: 
 
Gen:  No distress, alert HEENT:  Normal cephalic atraumatic, extra-occular movements are intact. Neck:  Supple, No JVD Lungs:  Clear bilaterally, no wheeze, no rales, normal effort Heart:  Regular Rate and Rhythm, normal S1 and S2, no edema Abdomen:  Soft, non tender, normal bowel sounds, no guarding. Extremities:  Well perfused, no cyanosis or edema Neurological:  Speech seems close to normal.  Awake and alert, CN's are intact, normal strength throughout extremities Skin:  No rashes or moles Mental Status:  Normal thought process, does not appear anxious Laboratory Studies: 
 
BMP:  
Lab Results Component Value Date/Time  11/19/2018 03:05 PM  
 K 4.2 11/19/2018 03:05 PM  
  11/19/2018 03:05 PM  
 CO2 23 11/19/2018 03:05 PM  
 AGAP 6 11/19/2018 03:05 PM  
  (H) 11/19/2018 03:05 PM  
 BUN 20 (H) 11/19/2018 03:05 PM  
 CREA 0.82 11/19/2018 03:05 PM  
 GFRAA >60 11/19/2018 03:05 PM  
 GFRNA >60 11/19/2018 03:05 PM  
 
CBC:  
Lab Results Component Value Date/Time WBC 6.7 11/20/2018 01:30 AM  
 HGB 17.5 (H) 11/20/2018 01:30 AM  
 HCT 50.2 (H) 11/20/2018 01:30 AM  
  11/20/2018 01:30 AM  
 
 
Assessment/Plan Principal Problem: 
  TIA (transient ischemic attack) (11/19/2018) Active Problems: Anxiety (5/3/2016) Bilateral primary osteoarthritis of hip (7/14/2016) Essential hypertension (9/7/2018) PLAN: 
 
Will follow stroke workup BP control Follow mental status Assess plan for risk factor modification after workup compelte DVT prophylaxis. Cheryl Lemon

## 2018-11-19 NOTE — ED NOTES
Patient brought to room from results waiting. Patient states stumbling and problems with speech since waking up this morning at 10am.  Patient also states right arm tingling for the last month. Cyndie Elmore MD brought to bedside and code s level 2 called. Patient taken to CT on monitor and returned to room without incident.

## 2018-11-19 NOTE — ED NOTES
TRANSFER - ED to INPATIENT REPORT: 
 
SBAR report made available to receiving floor on this patient being transferred to [address] [place] [address] ([facility])  for routine progression of care Admitting diagnosis TIA (transient ischemic attack) Information from the following report(s) SBAR, Kardex, ED Summary, [facility] and Recent Results was made available to receiving floor. Lines:  
Peripheral IV 11/19/18 Right Antecubital (Active) Site Assessment Clean, dry, & intact 11/19/2018  3:15 PM  
Phlebitis Assessment 0 11/19/2018  3:15 PM  
Infiltration Assessment 0 11/19/2018  3:15 PM  
Dressing Status Clean, dry, & intact 11/19/2018  3:15 PM  
Dressing Type Transparent 11/19/2018  3:15 PM  
Hub Color/Line Status Flushed 11/19/2018  3:15 PM  
  
 
Medication list confirmed with patient Opportunity for questions and clarification was provided. Patient is oriented to time, place, person and situation Last NIH 1 Patient is  continent and ambulatory without assist 
  
Valuables transported with patient Patient transported with: 
 Monitor Registered Nurse [place] Vitals:  
 11/19/18 1610 11/19/18 1615 11/19/18 1625 11/19/18 1630 BP: 153/83 162/78 152/68 140/79 Pulse: 63 60 64 64 Resp: 19 16 16 18 Temp:      
SpO2: 97% 97% 97% 96% Weight:      
Height:

## 2018-11-19 NOTE — ED PROVIDER NOTES
EMERGENCY DEPARTMENT HISTORY AND PHYSICAL EXAM 
 
3:40 PM 
 
 
Date: 11/19/2018 Patient Name: Edith Chilel History of Presenting Illness Chief Complaint Patient presents with  Dysarthria History Provided By: Patient Chief Complaint: Dysarthria Duration: This morning Timing:  Acute Location: Neuro Quality: Not obtained Severity: Moderate Modifying Factors: There are no modifying factors Associated Symptoms: Right arm tingling Additional History (Context): 3:41 PM Edith Chilel is a 60 y.o. male with h/o anxiety, HTN, and is a current smoker who presents to ED complaining of acute moderate dysarthria onset this morning. Pt says that around 10:00 this morning the patient started having difficulty with speech. He says that he currently feels \"staggered\" and it's hard to get words out. He reports being normal yesterday. He says his speech is a little off right now but not at bad as it was this morning. The patient reports drinking last night but he did not fall or pass out. He denies any head pain. No other concerns or symptoms at this time. PCP: Gerry Franks MD 
 
Current Facility-Administered Medications Medication Dose Route Frequency Provider Last Rate Last Dose  aspirin chewable tablet 324 mg  324 mg Oral NOW Kena Lakhani MD      
 0.9% sodium chloride infusion  100 mL/hr IntraVENous CONTINUOUS Kena Lakhani MD      
 
Current Outpatient Medications Medication Sig Dispense Refill  amLODIPine (NORVASC) 5 mg tablet TAKE ONE TABLET BY MOUTH ONCE DAILY 30 Tab 11  
 albuterol (PROVENTIL HFA, VENTOLIN HFA, PROAIR HFA) 90 mcg/actuation inhaler Take 2 Puffs by inhalation every six (6) hours as needed for Wheezing or Shortness of Breath.  1 Inhaler 12  
 tiZANidine (ZANAFLEX) 4 mg tablet TAKE ONE TABLET BY MOUTH EVERY 6 HOURS AS NEEDED 40 Tab 3  
 fluocinoNIDE (LIDEX) 0.05 % ointment Apply  to affected area two (2) times a day. 30 g 0  
 citalopram (CELEXA) 10 mg tablet Take 1 Tab by mouth every evening. Indications: Generalized Anxiety Disorder 90 Tab 3  
 LORazepam (ATIVAN) 1 mg tablet Take 1 Tab by mouth every eight (8) hours as needed for Anxiety. Max Daily Amount: 3 mg. 30 Tab 2  
 sildenafil citrate (VIAGRA) 100 mg tablet Take 1 Tab by mouth as needed. 12 Tab 1  
 fluticasone (FLOVENT HFA) 44 mcg/actuation inhaler Take 1 Puff by inhalation two (2) times a day. Indications: MAINTENANCE THERAPY FOR ASTHMA 1 Inhaler 2  
 fluorouracil (EFUDEX) 5 % chemo cream Apply  to affected area two (2) times a day. 40 g 0 Past History Past Medical History: 
Past Medical History:  
Diagnosis Date  Anxiety  Arthritis   
 osteo-hips  Asthma   
 as a child only  Bilateral primary osteoarthritis of hip 7/14/2016  Broken rib april 2013  
 3 broken ribs s/p fall with hemothorax  Chronic pain   
 hips, legs and knees  Hypertension  Psychiatric disorder   
 anxiety Past Surgical History: 
Past Surgical History:  
Procedure Laterality Date  HX HERNIA REPAIR Left  HX LUMBAR DISKECTOMY  HX MOHS PROCEDURES    
 (R) rotator cuff Family History: 
Family History Problem Relation Age of Onset  Dementia Mother  Cancer Father 61 Social History: 
Social History Tobacco Use  Smoking status: Current Every Day Smoker Packs/day: 0.25 Years: 30.00 Pack years: 7.50  Smokeless tobacco: Never Used  Tobacco comment: pt will try to quit smoking Substance Use Topics  Alcohol use: No  
  Alcohol/week: 0.0 oz  
  Comment: quit 1/2016  Drug use: No  
 
 
Allergies: Allergies Allergen Reactions  Neurontin [Gabapentin] Other (comments) Upset stomach Review of Systems Review of Systems Constitutional: Negative for fever. Respiratory: Negative for cough. Neurological: Positive for speech difficulty.  Negative for syncope and headaches. Right arm tingling All other systems reviewed and are negative. Physical Exam  
 
Visit Vitals /79 Pulse 64 Temp 97.5 °F (36.4 °C) Resp 18 Ht 5' 7\" (1.702 m) Wt 74.8 kg (165 lb) SpO2 96% BMI 25.84 kg/m² Physical Exam  
Constitutional: He is oriented to person, place, and time. He appears well-developed and well-nourished. No distress. HENT:  
Head: Normocephalic and atraumatic. Mouth/Throat: Oropharynx is clear and moist.  
Eyes: Conjunctivae and EOM are normal. Pupils are equal, round, and reactive to light. No scleral icterus. Neck: Normal range of motion. Neck supple. Cardiovascular: Normal rate, regular rhythm and normal heart sounds. No murmur heard. Pulmonary/Chest: Effort normal and breath sounds normal. No respiratory distress. Abdominal: Soft. Bowel sounds are normal. He exhibits no distension. There is no tenderness. Musculoskeletal: He exhibits no edema. Lymphadenopathy:  
  He has no cervical adenopathy. Neurological: He is alert and oriented to person, place, and time. Coordination normal.  
Skin: Skin is warm and dry. No rash noted. Psychiatric: He has a normal mood and affect. His behavior is normal.  
Nursing note and vitals reviewed. Diagnostic Study Results Labs - Recent Results (from the past 12 hour(s)) CBC WITH AUTOMATED DIFF Collection Time: 11/19/18  3:05 PM  
Result Value Ref Range WBC 8.1 4.6 - 13.2 K/uL  
 RBC 5.68 (H) 4.70 - 5.50 M/uL  
 HGB 18.6 (H) 13.0 - 16.0 g/dL HCT 54.6 (H) 36.0 - 48.0 % MCV 96.1 74.0 - 97.0 FL  
 MCH 32.7 24.0 - 34.0 PG  
 MCHC 34.1 31.0 - 37.0 g/dL  
 RDW 14.2 11.6 - 14.5 % PLATELET 112 903 - 998 K/uL MPV 10.1 9.2 - 11.8 FL  
 NEUTROPHILS 66 40 - 73 % LYMPHOCYTES 24 21 - 52 % MONOCYTES 8 3 - 10 % EOSINOPHILS 1 0 - 5 % BASOPHILS 1 0 - 2 %  
 ABS. NEUTROPHILS 5.3 1.8 - 8.0 K/UL  
 ABS. LYMPHOCYTES 2.0 0.9 - 3.6 K/UL ABS. MONOCYTES 0.7 0.05 - 1.2 K/UL  
 ABS. EOSINOPHILS 0.1 0.0 - 0.4 K/UL  
 ABS. BASOPHILS 0.1 0.0 - 0.1 K/UL  
 DF AUTOMATED METABOLIC PANEL, BASIC Collection Time: 11/19/18  3:05 PM  
Result Value Ref Range Sodium 137 136 - 145 mmol/L Potassium 4.2 3.5 - 5.5 mmol/L Chloride 108 100 - 108 mmol/L  
 CO2 23 21 - 32 mmol/L Anion gap 6 3.0 - 18 mmol/L Glucose 102 (H) 74 - 99 mg/dL BUN 20 (H) 7.0 - 18 MG/DL Creatinine 0.82 0.6 - 1.3 MG/DL  
 BUN/Creatinine ratio 24 (H) 12 - 20 GFR est AA >60 >60 ml/min/1.73m2 GFR est non-AA >60 >60 ml/min/1.73m2 Calcium 9.1 8.5 - 10.1 MG/DL PROTHROMBIN TIME + INR Collection Time: 11/19/18  3:05 PM  
Result Value Ref Range Prothrombin time 12.5 11.5 - 15.2 sec INR 1.0 0.8 - 1.2 THROMBIN TIME Collection Time: 11/19/18  3:05 PM  
Result Value Ref Range Thrombin time 17.6 13.8 - 18.2 SECS  
EKG, 12 LEAD, INITIAL Collection Time: 11/19/18  4:21 PM  
Result Value Ref Range Ventricular Rate 60 BPM  
 Atrial Rate 60 BPM  
 P-R Interval 168 ms QRS Duration 98 ms Q-T Interval 380 ms QTC Calculation (Bezet) 380 ms Calculated P Axis 57 degrees Calculated R Axis 38 degrees Calculated T Axis 26 degrees Diagnosis Normal sinus rhythm Incomplete right bundle branch block Borderline ECG When compared with ECG of 05-JUL-2016 13:40, No significant change was found Radiologic Studies -  
XR CHEST PORT Final Result CT HEAD WO CONT Final Result MRI BRAIN WO CONT    (Results Pending) MRA NECK WO CONT    (Results Pending) MRA BRAIN WO CONT    (Results Pending) Medical Decision Making I am the first provider for this patient. I reviewed the vital signs, available nursing notes, past medical history, past surgical history, family history and social history. Vital Signs-Reviewed the patient's vital signs. Pulse Oximetry Analysis -  97% on room air (Interpretation) WNL Cardiac Monitor: 
Rate: 66 bpm  
Rhythm:  Normal Sinus Rhythm EKG: Interpreted by the EP. Time Interpreted: 16:21 Rate: 60 bpm 
 Rhythm: Normal Sinus Rhythm Interpretation: RBBB Records Reviewed: Nursing Notes and Old Medical Records (Time of Review: 3:40 PM) ED Course: Progress Notes, Reevaluation, and Consults: 
3:47PM 
Patient was not code S initially on arrival due to the triage note. Code S was called upon seeing the patient. Consult:  Discussed care with Dr. Jolene Linder, Specialty: Patronus  Standard discussion; including history of patients chief complaint, available diagnostic results, and treatment course. Discussed whether patient was having a stroke or not. 3:54 PM, 11/19/2018 Consult:  Discussed care with Dr. Jolene Linder, Specialty: Patronus  Standard discussion; including history of patients chief complaint, available diagnostic results, and treatment course. Recommends admission and stroke work up.  
4:11 PM, 11/19/2018 Consult:  Discussed care with Dr. Zack Tinsley, Specialty: Hospitalist  Standard discussion; including history of patients chief complaint, available diagnostic results, and treatment course. Agrees to admit 4:38 PM, 11/19/2018 Provider Notes (Medical Decision Making): MDM Number of Diagnoses or Management Options Dysphasia:  
Diagnosis management comments: Slurred speech and ataxic gait with last time normal 10 am now back to baseline ? TIA code S called Asa given fluids started mri/mra ordered Amount and/or Complexity of Data Reviewed Clinical lab tests: ordered and reviewed Tests in the radiology section of CPT®: ordered and reviewed Discuss the patient with other providers: yes Independent visualization of images, tracings, or specimens: yes Risk of Complications, Morbidity, and/or Mortality Presenting problems: high Diagnostic procedures: moderate Management options: moderate Critical Care Time: 
 The services I provided to this patient were to treat and/or prevent clinically significant deterioration that could result in the failure of one or more body systems and/or organ systems due to stroke. Services included the following: 
-reviewing nursing notes and old charts 
-vital sign assessments 
-direct patient care 
-medication orders and management 
-interpreting and reviewing diagnostic studies/labs 
-re-evaluations 
-documentation time Aggregate critical care time was 30 minutes, which includes only time during which I was engaged in work directly related to the patient's care as described above, whether I was at bedside or elsewhere in the Emergency Department. It did not include time spent performing other reported procedures or the services of residents, students, nurses, or advance practice providers. Jessica Gee MD 
 
4:18 PM 
 
 
For Hospitalized Patients: 
 
1. Hospitalization Decision Time: The decision to hospitalize the patient was made by Dr. Caroline Ayon at [facility] PM on 11/19/2018 2. Aspirin: Aspirin was given Diagnosis Clinical Impression: 1. Dysphasia Disposition: Admitted Follow-up Information None Medication List  
  
ASK your doctor about these medications   
albuterol 90 mcg/actuation inhaler Commonly known as:  PROVENTIL HFA, VENTOLIN HFA, PROAIR HFA Take 2 Puffs by inhalation every six (6) hours as needed for Wheezing or Shortness of Breath. amLODIPine 5 mg tablet Commonly known as:  Lesia Paget TAKE ONE TABLET BY MOUTH ONCE DAILY 
  
citalopram 10 mg tablet Commonly known as:  Carmelo Rhodseley Take 1 Tab by mouth every evening. Indications: Generalized Anxiety Disorder 
  
fluocinoNIDE 0.05 % ointment Commonly known as:  LIDEX Apply  to affected area two (2) times a day. fluorouracil 5 % chemo cream 
Commonly known as:  EFUDEX Apply  to affected area two (2) times a day. fluticasone 44 mcg/actuation inhaler Commonly known as:  FLOVENT HFA Take 1 Puff by inhalation two (2) times a day. Indications: MAINTENANCE THERAPY FOR ASTHMA LORazepam 1 mg tablet Commonly known as:  ATIVAN Take 1 Tab by mouth every eight (8) hours as needed for Anxiety. Max Daily Amount: 3 mg. 
  
sildenafil citrate 100 mg tablet Commonly known as:  VIAGRA Take 1 Tab by mouth as needed. tiZANidine 4 mg tablet Commonly known as:  Donnia Grate TAKE ONE TABLET BY MOUTH EVERY 6 HOURS AS NEEDED 
  
  
 
_______________________________ Attestations: 
Scribe Attestation Dayana Eric acting as a scribe for and in the presence of Fabiana Bingham MD     
November 19, 2018 at 3:40 PM 
    
Provider Attestation:     
I personally performed the services described in the documentation, reviewed the documentation, as recorded by the scribe in my presence, and it accurately and completely records my words and actions. November 19, 2018 at 3:40 PM - Fabiana Bingham MD   
_______________________________

## 2018-11-19 NOTE — ED NOTES
Patient eating and drinking at this time. Patient asked to remain NPO at this time, family aware as well.

## 2018-11-20 VITALS
WEIGHT: 165 LBS | BODY MASS INDEX: 25.9 KG/M2 | TEMPERATURE: 97.6 F | HEART RATE: 61 BPM | DIASTOLIC BLOOD PRESSURE: 77 MMHG | SYSTOLIC BLOOD PRESSURE: 142 MMHG | HEIGHT: 67 IN | RESPIRATION RATE: 15 BRPM | OXYGEN SATURATION: 96 %

## 2018-11-20 PROBLEM — E78.5 HYPERLIPIDEMIA: Status: ACTIVE | Noted: 2018-11-20

## 2018-11-20 LAB
ABO + RH BLD: NORMAL
ATRIAL RATE: 60 BPM
BASOPHILS # BLD: 0.1 K/UL (ref 0–0.1)
BASOPHILS NFR BLD: 1 % (ref 0–2)
BLOOD GROUP ANTIBODIES SERPL: NORMAL
CALCULATED P AXIS, ECG09: 57 DEGREES
CALCULATED R AXIS, ECG10: 38 DEGREES
CALCULATED T AXIS, ECG11: 26 DEGREES
CHOLEST SERPL-MCNC: 172 MG/DL
DIAGNOSIS, 93000: NORMAL
DIFFERENTIAL METHOD BLD: ABNORMAL
EOSINOPHIL # BLD: 0.4 K/UL (ref 0–0.4)
EOSINOPHIL NFR BLD: 6 % (ref 0–5)
ERYTHROCYTE [DISTWIDTH] IN BLOOD BY AUTOMATED COUNT: 14.2 % (ref 11.6–14.5)
ERYTHROCYTE [SEDIMENTATION RATE] IN BLOOD: 1 MM/HR (ref 0–20)
EST. AVERAGE GLUCOSE BLD GHB EST-MCNC: 111 MG/DL
GLUCOSE BLD STRIP.AUTO-MCNC: 105 MG/DL (ref 70–110)
GLUCOSE BLD STRIP.AUTO-MCNC: 114 MG/DL (ref 70–110)
GLUCOSE BLD STRIP.AUTO-MCNC: 121 MG/DL (ref 70–110)
HBA1C MFR BLD: 5.5 % (ref 4.2–5.6)
HCT VFR BLD AUTO: 50.2 % (ref 36–48)
HDLC SERPL-MCNC: 66 MG/DL (ref 40–60)
HDLC SERPL: 2.6 {RATIO} (ref 0–5)
HGB BLD-MCNC: 17.5 G/DL (ref 13–16)
LDLC SERPL CALC-MCNC: 84.4 MG/DL (ref 0–100)
LIPID PROFILE,FLP: ABNORMAL
LYMPHOCYTES # BLD: 2.8 K/UL (ref 0.9–3.6)
LYMPHOCYTES NFR BLD: 41 % (ref 21–52)
MCH RBC QN AUTO: 33.6 PG (ref 24–34)
MCHC RBC AUTO-ENTMCNC: 34.9 G/DL (ref 31–37)
MCV RBC AUTO: 96.4 FL (ref 74–97)
MONOCYTES # BLD: 0.8 K/UL (ref 0.05–1.2)
MONOCYTES NFR BLD: 12 % (ref 3–10)
NEUTS SEG # BLD: 2.7 K/UL (ref 1.8–8)
NEUTS SEG NFR BLD: 40 % (ref 40–73)
P-R INTERVAL, ECG05: 168 MS
PLATELET # BLD AUTO: 191 K/UL (ref 135–420)
PMV BLD AUTO: 10.7 FL (ref 9.2–11.8)
Q-T INTERVAL, ECG07: 380 MS
QRS DURATION, ECG06: 98 MS
QTC CALCULATION (BEZET), ECG08: 380 MS
RBC # BLD AUTO: 5.21 M/UL (ref 4.7–5.5)
SPECIMEN EXP DATE BLD: NORMAL
T3FREE SERPL-MCNC: 2.9 PG/ML (ref 2.18–3.98)
T4 FREE SERPL-MCNC: 1 NG/DL (ref 0.7–1.5)
TRIGL SERPL-MCNC: 108 MG/DL (ref ?–150)
TSH SERPL DL<=0.05 MIU/L-ACNC: 0.58 UIU/ML (ref 0.36–3.74)
VENTRICULAR RATE, ECG03: 60 BPM
VLDLC SERPL CALC-MCNC: 21.6 MG/DL
WBC # BLD AUTO: 6.7 K/UL (ref 4.6–13.2)

## 2018-11-20 PROCEDURE — 85025 COMPLETE CBC W/AUTO DIFF WBC: CPT

## 2018-11-20 PROCEDURE — 36415 COLL VENOUS BLD VENIPUNCTURE: CPT

## 2018-11-20 PROCEDURE — C9113 INJ PANTOPRAZOLE SODIUM, VIA: HCPCS | Performed by: HOSPITALIST

## 2018-11-20 PROCEDURE — 82962 GLUCOSE BLOOD TEST: CPT

## 2018-11-20 PROCEDURE — 74011000250 HC RX REV CODE- 250: Performed by: HOSPITALIST

## 2018-11-20 PROCEDURE — 74011250636 HC RX REV CODE- 250/636: Performed by: HOSPITALIST

## 2018-11-20 PROCEDURE — 74011250637 HC RX REV CODE- 250/637: Performed by: HOSPITALIST

## 2018-11-20 PROCEDURE — 86901 BLOOD TYPING SEROLOGIC RH(D): CPT

## 2018-11-20 RX ORDER — ASPIRIN 325 MG/1
100 TABLET, FILM COATED ORAL DAILY
Qty: 30 TAB | Refills: 0 | Status: SHIPPED | OUTPATIENT
Start: 2018-11-21 | End: 2018-12-27 | Stop reason: SDUPTHER

## 2018-11-20 RX ORDER — INSULIN LISPRO 100 [IU]/ML
INJECTION, SOLUTION INTRAVENOUS; SUBCUTANEOUS
Status: DISCONTINUED | OUTPATIENT
Start: 2018-11-20 | End: 2018-11-20 | Stop reason: HOSPADM

## 2018-11-20 RX ORDER — FOLIC ACID 1 MG/1
1 TABLET ORAL DAILY
Status: DISCONTINUED | OUTPATIENT
Start: 2018-11-20 | End: 2018-11-20 | Stop reason: HOSPADM

## 2018-11-20 RX ORDER — ATORVASTATIN CALCIUM 80 MG/1
80 TABLET, FILM COATED ORAL
Qty: 30 TAB | Refills: 0 | Status: SHIPPED | OUTPATIENT
Start: 2018-11-20 | End: 2019-01-14 | Stop reason: SDUPTHER

## 2018-11-20 RX ORDER — FOLIC ACID 1 MG/1
1 TABLET ORAL DAILY
Qty: 30 TAB | Refills: 0 | Status: SHIPPED | OUTPATIENT
Start: 2018-11-21 | End: 2018-12-27 | Stop reason: SDUPTHER

## 2018-11-20 RX ORDER — ASPIRIN 325 MG
325 TABLET ORAL DAILY
Qty: 30 TAB | Refills: 1 | Status: SHIPPED | OUTPATIENT
Start: 2018-11-21

## 2018-11-20 RX ORDER — ASPIRIN 325 MG/1
100 TABLET, FILM COATED ORAL DAILY
Status: DISCONTINUED | OUTPATIENT
Start: 2018-11-20 | End: 2018-11-20 | Stop reason: HOSPADM

## 2018-11-20 RX ADMIN — TIZANIDINE 4 MG: 4 TABLET ORAL at 09:03

## 2018-11-20 RX ADMIN — AMLODIPINE BESYLATE 5 MG: 5 TABLET ORAL at 09:03

## 2018-11-20 RX ADMIN — FOLIC ACID 1 MG: 1 TABLET ORAL at 09:15

## 2018-11-20 RX ADMIN — Medication 100 MG: at 09:15

## 2018-11-20 RX ADMIN — SODIUM CHLORIDE 40 MG: 9 INJECTION INTRAMUSCULAR; INTRAVENOUS; SUBCUTANEOUS at 09:03

## 2018-11-20 RX ADMIN — ASPIRIN 325 MG ORAL TABLET 325 MG: 325 PILL ORAL at 09:03

## 2018-11-20 NOTE — PROGRESS NOTES
2015: Admit pt from ED. Pt arrived via stretcher. Received pt alert and oriented x 4. Denies any pain at this time, no signs of distress. Dual NIH done with MAXIME Ma. Bed on lowest position, wheels locked, call bell within reach. 11-20-18 
 
0830: Bedside and Verbal shift change report given to Elaine Guadalupe (oncoming nurse) by Sari Rubio 
 (offgoing nurse). Report included the following information SBAR, Intake/Output, MAR and Recent Results. Pt's PIV infiltrated, attempted to insert new IV was unsuccessful. Pt stated it's ok if somebody else will try to insert new IV later. This nurse verbalized understanding.

## 2018-11-20 NOTE — PROGRESS NOTES
Nutrition initial assessment/Plan of care RECOMMENDATIONS:  
1. 2g Na Diet 2. Monitor weight, labs and PO intake 3. RD to follow GOALS:  
1. PO intake meets >75% of protein/calorie needs by 11/27 2. Weight Maintenance (+/- 1-2 lb) by 11/27 ASSESSMENT:  
Wt status is classified as overweight per BMI of 25.8. Unsure of PO intake. Labs noted. BG range (114-176) over the past 24 hours; A1c (5.5). Lipid panel WNL. Nutrition recommendations listed. RD to follow. Nutrition Diagnoses:  
Overweight related to excessive energy intake PTA as evidenced by a BMI of 25.8 and 111% IBW. Nutrition Risk:  [] High  [] Moderate [x]  Low SUBJECTIVE/OBJECTIVE:  
 Pt admitted for TIA. PMHx including HTN and HLD. Pt seen in room; appears well nourished. Denies having any food allergies or problems chewing/swallowing and -170 lb. Reports having a good appetite. Discussed some nutrition recommendations for low sat fat, low Na, CHO choices and portion control; handouts provided. Information Obtained from:  
 [x] Chart Review [x] Patient 
 [] Family/Caregiver 
 [] Nurse/Physician 
 [] Interdisciplinary Meeting/Rounds Diet: Regular Diet Medications: [x] Reviewed Norvasc, Lipitor, Folvite, Vit B1 Allergies: [x] Reviewed Encounter Diagnoses ICD-10-CM ICD-9-CM 1. Dysphasia R47.02 784.59 Past Medical History:  
Diagnosis Date  Anxiety  Arthritis   
 osteo-hips  Asthma   
 as a child only  Bilateral primary osteoarthritis of hip 7/14/2016  Broken rib april 2013  
 3 broken ribs s/p fall with hemothorax  Chronic pain   
 hips, legs and knees  Hypertension  Psychiatric disorder   
 anxiety Labs:   
Lab Results Component Value Date/Time  Sodium 137 11/19/2018 03:05 PM  
 Potassium 4.2 11/19/2018 03:05 PM  
 Chloride 108 11/19/2018 03:05 PM  
 CO2 23 11/19/2018 03:05 PM  
 Anion gap 6 11/19/2018 03:05 PM  
 Glucose 102 (H) 11/19/2018 03:05 PM  
 BUN 20 (H) 11/19/2018 03:05 PM  
 Creatinine 0.82 11/19/2018 03:05 PM  
 Calcium 9.1 11/19/2018 03:05 PM  
 Albumin 4.2 07/05/2016 01:57 PM  
 
Lab Results Component Value Date/Time Cholesterol, total 172 11/19/2018 03:05 PM  
 HDL Cholesterol 66 (H) 11/19/2018 03:05 PM  
 LDL, calculated 84.4 11/19/2018 03:05 PM  
 VLDL, calculated 21.6 11/19/2018 03:05 PM  
 Triglyceride 108 11/19/2018 03:05 PM  
 CHOL/HDL Ratio 2.6 11/19/2018 03:05 PM  
 
 
Anthropometrics: BMI (calculated): 25.8 Last 3 Recorded Weights in this Encounter 11/19/18 1450 Weight: 74.8 kg (165 lb) Ht Readings from Last 1 Encounters:  
11/19/18 5' 7\" (1.702 m) Weight Metrics 11/19/2018 10/8/2018 9/7/2018 4/16/2018 1/16/2018 12/18/2017 9/29/2017 Weight 165 lb 161 lb 6.4 oz 160 lb 166 lb 171 lb 171 lb 12.8 oz 178 lb BMI 25.84 kg/m2 25.28 kg/m2 25.06 kg/m2 26 kg/m2 26.78 kg/m2 26.91 kg/m2 27.88 kg/m2 No data found. IBW: 148 lb %IBW: 111% UBW: 165-170 lb  
[] Weight Loss [] Weight Gain [x] Weight Stable Estimated Nutrition Needs: [x] MSJ  [] Other: 
Calories: 4799-9454 kcal Based on:   [x] Actual BW   
Protein:   75-90 g Based on:   [x] Actual BW Fluid:       6355-8986 ml Based on:   [x] Actual BW  
 
 [x] No Cultural, Druze or ethnic dietary need identified. [] Cultural, Druze and ethnic food preferences identified and addressed Wt Status:  [] Normal (18.6 - 24.9) [] Underweight (<18.5) [x] Overweight (25 - 29.9) [] Mild Obesity (30 - 34.9)  [] Moderate Obesity (35 - 39.9) [] Morbid Obesity (40+) Nutrition Problems Identified:  
[] Suboptimal PO intake  
[] Food Allergies [] Difficulty chewing/swallowing/poor dentition 
[] Constipation/Diarrhea  
[] Nausea/Vomiting  
[x] None 
[] Other:  
 
Plan:  
[x] Therapeutic Diet 
[]  Obtained/adjusted food preferences/tolerances and/or snacks options  
[]  Supplements added  
[] Occupational therapy following for feeding techniques []  HS snack added []  Modify diet texture  
[]  Modify diet for food allergies [x]  Educate patient  
[]  Assist with menu selection  
[x]  Monitor PO intake on meal rounds  
[x]  Continue inpatient monitoring and intervention  
[]  Participated in discharge planning/Interdisciplinary rounds/Team meetings  
[]  Other:  
 
Education Needs: 
 [] Not appropriate for teaching at this time due to: 
 [x] Identified and addressed Nutrition Monitoring and Evaluation: 
[x] Continue ongoing monitoring and intervention 
[] Other Tiara Manners

## 2018-11-20 NOTE — PROGRESS NOTES
Problem: Falls - Risk of 
Goal: *Absence of Falls Document Bob Mancini Fall Risk and appropriate interventions in the flowsheet. Outcome: Progressing Towards Goal 
Fall Risk Interventions: 
  
 
  
 
Medication Interventions: Patient to call before getting OOB

## 2018-11-20 NOTE — DISCHARGE INSTRUCTIONS
DISCHARGE SUMMARY from Nurse    PATIENT INSTRUCTIONS:    After general anesthesia or intravenous sedation, for 24 hours or while taking prescription Narcotics:  · Limit your activities  · Do not drive and operate hazardous machinery  · Do not make important personal or business decisions  · Do  not drink alcoholic beverages  · If you have not urinated within 8 hours after discharge, please contact your surgeon on call. Report the following to your surgeon:  · Excessive pain, swelling, redness or odor of or around the surgical area  · Temperature over 100.5  · Nausea and vomiting lasting longer than 4 hours or if unable to take medications  · Any signs of decreased circulation or nerve impairment to extremity: change in color, persistent  numbness, tingling, coldness or increase pain  · Any questions    What to do at Home:  Recommended activity: Activity as tolerated    If you experience any of the following symptoms listed under Signs and Symptoms of A Stroke or Warning Signs of a Heart Attack , please follow up with your PCP and/or call 911. *  Please give a list of your current medications to your Primary Care Provider. *  Please update this list whenever your medications are discontinued, doses are      changed, or new medications (including over-the-counter products) are added. *  Please carry medication information at all times in case of emergency situations. These are general instructions for a healthy lifestyle:    No smoking/ No tobacco products/ Avoid exposure to second hand smoke  Surgeon General's Warning:  Quitting smoking now greatly reduces serious risk to your health.     Obesity, smoking, and sedentary lifestyle greatly increases your risk for illness    A healthy diet, regular physical exercise & weight monitoring are important for maintaining a healthy lifestyle    You may be retaining fluid if you have a history of heart failure or if you experience any of the following symptoms: Weight gain of 3 pounds or more overnight or 5 pounds in a week, increased swelling in our hands or feet or shortness of breath while lying flat in bed. Please call your doctor as soon as you notice any of these symptoms; do not wait until your next office visit. Recognize signs and symptoms of STROKE:    F-face looks uneven    A-arms unable to move or move unevenly    S-speech slurred or non-existent    T-time-call 911 as soon as signs and symptoms begin-DO NOT go       Back to bed or wait to see if you get better-TIME IS BRAIN. Warning Signs of HEART ATTACK     Call 911 if you have these symptoms:   Chest discomfort. Most heart attacks involve discomfort in the center of the chest that lasts more than a few minutes, or that goes away and comes back. It can feel like uncomfortable pressure, squeezing, fullness, or pain.  Discomfort in other areas of the upper body. Symptoms can include pain or discomfort in one or both arms, the back, neck, jaw, or stomach.  Shortness of breath with or without chest discomfort.  Other signs may include breaking out in a cold sweat, nausea, or lightheadedness. Don't wait more than five minutes to call 911 - MINUTES MATTER! Fast action can save your life. Calling 911 is almost always the fastest way to get lifesaving treatment. Emergency Medical Services staff can begin treatment when they arrive -- up to an hour sooner than if someone gets to the hospital by car. The discharge information has been reviewed with the patient. The patient verbalized understanding. Discharge medications reviewed with the patient and appropriate educational materials and side effects teaching were provided.   ___________________________________________________________________________________________________________________________________     Transient Ischemic Attack: Care Instructions  Your Care Instructions    A transient ischemic attack (TIA) is when blood flow to a part of your brain is blocked for a short time. A TIA is like a stroke but usually lasts only a few minutes. A TIA does not cause lasting brain damage. Any vision problems, slurred speech, or other symptoms usually go away in 10 to 20 minutes. But they may last for up to 24 hours. TIAs are often warning signs of a stroke. Some people who have a TIA may have a stroke in the future. A stroke can cause symptoms like those of a TIA. But a stroke causes lasting damage to your brain. You can take steps to help prevent a stroke. One thing you can do is get early treatment. If you have other new symptoms, or if your symptoms do not get better, go back to the emergency room or call your doctor right away. Getting treatment right away may prevent long-term brain damage caused by a stroke. The doctor has checked you carefully, but problems can develop later. If you notice any problems or new symptoms, get medical treatment right away. Follow-up care is a key part of your treatment and safety. Be sure to make and go to all appointments, and call your doctor if you are having problems. It's also a good idea to know your test results and keep a list of the medicines you take. How can you care for yourself at home? Medicines    · Be safe with medicines. Take your medicines exactly as prescribed. Call your doctor if you think you are having a problem with your medicine.     · If you take a blood thinner, such as aspirin, be sure you get instructions about how to take your medicine safely. Blood thinners can cause serious bleeding problems.     · Call your doctor if you are not able to take your medicines for any reason.     · Do not take any over-the-counter medicines or herbal products without talking to your doctor first.     · If you take birth control pills or hormone therapy, talk to your doctor. Ask if these treatments are right for you.    Lifestyle changes    · Do not smoke.  If you need help quitting, talk to your doctor about stop-smoking programs and medicines.     · Be active. If your doctor recommends it, get more exercise. Walking is a good choice. Bit by bit, increase the amount you walk every day. Try for at least 30 minutes on most days of the week. You also may want to swim, bike, or do other activities.     · Eat heart-healthy foods. These include fruits, vegetables, high-fiber foods, fish, and foods that are low in sodium, saturated fat, and trans fat.     · Stay at a healthy weight. Lose weight if you need to.     · Limit alcohol to 2 drinks a day for men and 1 drink a day for women.    Staying healthy    · Manage other health problems such as diabetes, high blood pressure, and high cholesterol.     · Get the flu vaccine every year. When should you call for help? Call 911 anytime you think you may need emergency care. For example, call if:    · You have new or worse symptoms of a stroke. These may include:  ? Sudden numbness, tingling, weakness, or loss of movement in your face, arm, or leg, especially on only one side of your body. ? Sudden vision changes. ? Sudden trouble speaking. ? Sudden confusion or trouble understanding simple statements. ? Sudden problems with walking or balance. ? A sudden, severe headache that is different from past headaches. Call 911 even if these symptoms go away in a few minutes.     · You feel like you are having another TIA.    Watch closely for changes in your health, and be sure to contact your doctor if you have any problems. Where can you learn more? Go to http://carolina-misbah.info/. Enter ([phone] in the search box to learn more about \"Transient Ischemic Attack: Care Instructions. \"  Current as of: November 21, 2017  Content Version: 11.8  © 8630-0576 [employer]. Care instructions adapted under license by [employer] (which disclaims liability or warranty for this information).  If you have questions about a medical condition or this instruction, always ask your healthcare professional. [place] [address] any warranty or liability for your use of this information.     Patient armband removed and shredded

## 2018-11-20 NOTE — PROGRESS NOTES
PT orders received and chart reviewed. PT screen completed. Encountered patient amb independently with steady gait. Denies mobility concerns for return home. Denies past use of AD or history of falls. Discontinuing PT orders. No skilled PT needs at this time. Thank you, Nichelle Leyva, PT, DPT Office Extension: [phone] Pager: [phone]

## 2018-11-20 NOTE — PROGRESS NOTES
SLP rec'd evaluation & treat orders. Chart reviewed and upon initial interview, it is deemed that a formal evaluation is not indicated. Pt A&Ox4; effective communicator. Pt's basic cognitive-linguistic function appears intact at this time. Observed pt with serial swallows of thin liquids; 0 overt distress noted. Accordingly, SLP will discontinue MD orders, as evaluation not indicated. SLP available for formal evaluation if further indicated by MD. 
 
SLP educated pt on role of speech therapist in current setting with re: speech/swallow; verbalized comprehension. Thank you for this referral. 
Nicole Charlton. Paul Corrigan M.S., CCC-SLP Office: [phone]

## 2018-11-20 NOTE — PROGRESS NOTES
Reason for Admission:   TIA 
                
RRAT Score:   10 Plan for utilizing home health:  Not at this time Likelihood of Readmission:  Low/green Transition of Care Plan:      
 
Spoke with pt. Verified all face sheet info. Pt lives with girlfriend Aftab Geller ([phone]), and his son. Independent with ADL's at baseline. Uses no DME Saw his PCP Dr. Jerome Cummings about a month ago. Has no concerns for discharge. Stated that his son is anxious about his dad being here, so he is anxious to go home. Patient has designated Girlfriend_______________________ to participate in his/her discharge plan and to receive any needed information. Name: Aftab Geller Address: 
Phone number: [phone] Care Management Interventions PCP Verified by CM: Yes Last Visit to PCP: 10/20/18 Mode of Transport at Discharge: Self Transition of Care Consult (CM Consult): Discharge Planning Current Support Network: Own Home Confirm Follow Up Transport: Friends Discharge Location Discharge Placement: Home

## 2018-11-20 NOTE — PROGRESS NOTES
conducted an initial consultation and Spiritual Assessment for Jarrett eLe, who is a 60 y.o.,male. Patients Primary Language is: [place]. According to the patients EMR Yarsani Affiliation is: Non Gnosticism.  
 
The reason the Patient came to the hospital is:  
Patient Active Problem List  
 Diagnosis Date Noted  Hyperlipidemia 11/20/2018  TIA (transient ischemic attack) 11/19/2018  Essential hypertension 09/07/2018  Status post right hip replacement 09/07/2018  Advance care planning 01/26/2017  Bilateral primary osteoarthritis of hip 07/14/2016  Anxiety 05/03/2016  Chronic pain 03/07/2016  Asthma 11/12/2013  Tobacco abuse 11/12/2013 The  provided the following Interventions: 
Initiated a relationship of care and support. Explored issues of fatmata, spirituality and/or Yarsani needs while hospitalized. Listened empathically. Provided chaplaincy education. Provided information about Spiritual Care Services. Offered prayer and assurance of continued prayers on patient's behalf. Chart reviewed. The following outcomes were achieved: 
Patient shared some information about their medical narrative and spiritual journey/beliefs. Patient processed feeling about current hospitalization. Patient expressed gratitude for the 's visit. Assessment: 
Patient did not indicate any spiritual or Yarsani issues which require Spiritual Care Services interventions at this time. Patient does not have any Yarsani/cultural needs that will affect patients preferences in health care. Plan: 
Chaplains will continue to follow and will provide pastoral care on an as needed or requested basis.  recommends bedside caregivers page  on duty if patient shows signs of acute spiritual or emotional distress. [address] [facility] Staff  Spiritual Care  
[phone]

## 2018-11-20 NOTE — PROGRESS NOTES
Internal Medicine Progress Note Patient's Name: Malou Martinez Admit Date: 11/19/2018 Length of Stay: 1 Assessment/Plan Active Hospital Problems Diagnosis Date Noted  Hyperlipidemia 11/20/2018  TIA (transient ischemic attack) 11/19/2018  Essential hypertension 09/07/2018  Bilateral primary osteoarthritis of hip 07/14/2016  Anxiety 05/03/2016  Tobacco abuse 11/12/2013 TIA 
- MRI brain shows acute lacunar infarct, chronic lacunar infarct, and chronic microvascular ischemic disease. 
- MRA brain/MRA neck unremarkable. - ASA and statin started - TeleNeurology consult pending HTN 
- cont norvasc 
- monitor BP 
 
HLD 
- Cont statin Anxiety 
- cont home meds Tobacco abuse 
- counseled pt on smoking cessation 
 
- Cont acceptable home medications for chronic conditions  
- DVT protocol I have personally reviewed all pertinent labs and films that have officially resulted over the last 24 hours. I have personally checked for all pending labs that are awaiting final results. Subjective Pt is a 59 yo male with PMHx of anxiety, HTN, tobaco abuse who presented to the ED on 11/19 with c/o expressive aphasia and dizziness onset that morning around 10am. Symptoms had improved partially by the time he got to the ED. CT neg for CVA. Pt admitted and MRI/MRA ordered. MRI brain shows acute lacunar infarct, chronic lacunar infarct, and chronic microvascular ischemic disease. MRA brain and neck were unremarkable. Tele-Neurology consulted. 11/20/18: Pt s/e @ bedside. No major events overnight. Pt states he is still stuttering, but balance is back to normal. Denies CP or SOB. Denies abd pain, N/V, vision changes, headache. Objective Visit Vitals /76 (BP 1 Location: Left arm, BP Patient Position: At rest) Pulse (!) 58 Temp 97.5 °F (36.4 °C) Resp 16 Ht 5' 7\" (1.702 m) Wt 74.8 kg (165 lb) SpO2 96% BMI 25.84 kg/m² Physical Exam: General Appearance: NAD, conversant HENT: normocephalic/atraumatic, moist mucus membranes Neck: No JVD, supple Lungs: CTA with normal respiratory effort CV: RRR, no m/r/g Abdomen: soft, non-tender, normal bowel sounds Extremities: no cyanosis, no peripheral edema Neuro: No focal deficits, motor/sensory intact, steady gait, some stuttering with speech Skin: Normal color, intact Lymphatics: No cervical or supraclavicular lymphadenopathy Psych: appropriate affect, alert and oriented to person, place and time Intake and Output: 
Current Shift:  No intake/output data recorded. Last three shifts:  No intake/output data recorded. Lab/Data Reviewed: 
CMP:  
Lab Results Component Value Date/Time  11/19/2018 03:05 PM  
 K 4.2 11/19/2018 03:05 PM  
  11/19/2018 03:05 PM  
 CO2 23 11/19/2018 03:05 PM  
 AGAP 6 11/19/2018 03:05 PM  
  (H) 11/19/2018 03:05 PM  
 BUN 20 (H) 11/19/2018 03:05 PM  
 CREA 0.82 11/19/2018 03:05 PM  
 GFRAA >60 11/19/2018 03:05 PM  
 GFRNA >60 11/19/2018 03:05 PM  
 CA 9.1 11/19/2018 03:05 PM  
 
CBC:  
Lab Results Component Value Date/Time WBC 6.7 11/20/2018 01:30 AM  
 HGB 17.5 (H) 11/20/2018 01:30 AM  
 HCT 50.2 (H) 11/20/2018 01:30 AM  
  11/20/2018 01:30 AM  
 
COAGS:  
Lab Results Component Value Date/Time PTP 12.5 11/19/2018 03:05 PM  
 INR 1.0 11/19/2018 03:05 PM  
 
 
Imaging Reviewed: 
[facility] Brain Wo Cont Result Date: 11/19/2018 MR angiogram of the [place] without contrast . CLINICAL INDICATION/HISTORY:  Ataxia, CVA. COMPARISON:  Correlation brain MRI same day. TECHNIQUE: 3-D time-of-flight imaging of the intracranial vasculature was performed. MIP reconstructions were obtained from the source images. _______________ FINDINGS: Evaluation of the intracranial vasculature is unremarkable.  Specifically, there is no evidence of hemodynamically significant stenosis or occlusion involving the main intracranial branches. There is no evidence of aneurysm formation or underlying vascular malformation. _______________ IMPRESSION:  1. Unremarkable MR angiogram of the intracranial vasculature. Mra Neck Wo Cont Result Date: 11/19/2018 MR angiogram of the cervical vasculature without contrast . CLINICAL INDICATION/HISTORY: Ataxia, CVA. COMPARISON:  Correlation brain MRA same day. TECHNIQUE: 2-D and 3-D time-of-flight MRA neck performed without gadolinium. MIP reconstructions were obtained from the source images. _______________ FINDINGS: GREAT VESSEL ORIGINS- limited evaluation due to noncontrast technique. CAROTID ARTERIES- Both common carotid arteries are patent throughout their visualized cervical course and the carotid bifurcations are clear, 0% stenosis by NASCET criteria. Both internal carotid arteries are also patent throughout their cervical course. VERTERBRAL ARTERIES- [Left vertebral artery dominance is noted. Visualized portions of both vertebral arteries appear patent without evidence of significant stenosis or occlusion. Limited evaluation origins and V1 segments due to technique. OTHER: None. IMPRESSION:  1. Unremarkable MR angiogram of the cervical vasculature, with limitations of noncontrast technique as above. Mri Brain Wo Cont Result Date: 11/19/2018 EXAM: MRI brain without gadolinium CLINICAL INDICATION/HISTORY: Ataxia, stroke suspected as etiology   > Additional: Difficulty with speech, right arm tingling COMPARISON: None. > Reference Exam: Correlation CT head same day TECHNIQUE: Sagittal FLAIR T1, axial T1, T2, FLAIR, susceptibility weighted, diffusion and coronal T2 sequences obtained of the brain. Imaging performed on wide bore Discovery [id number] [place] suite 3T magnet at [facility]. _______________ FINDINGS: Diffusion:  There is a oval-shaped area of restricted diffusion signal in the mid to posterior left corona radiata maximally 17 mm in AP dimension.  No other restricted diffusion signal. Brain parenchyma: There is mild increased T2 and FLAIR signal and slight low T1 signal associated with area of restricted diffusion signal left corona radiata. Tiny focus increased T2 and FLAIR signal medial left thalamus. Mild confluent and multifocal increased T2 and FLAIR signal periventricular and deep cerebral white matter, superior basal ganglia and central shavonne. No evidence of intracranial mass hemorrhage or mass effect. Ventricles and sulci:  Normal.  No significant atrophy given age. Extra-axial:  No extra-axial fluid collection or mass is noted. Brain vasculature:  No vascular abnormality is appreciated on this routine brain MR examination. Craniocervical junction:  Normal. Skull base, extracranial and calvarium:  Shallow sella, minimal maxillary sinus mucosal thickening. Orbits IACs and mastoids and skull unremarkable. _______________ IMPRESSION: 1. Acute lacunar infarct mid to posterior left corona radiata. 2. Tiny chronic lacunar infarct medial left thalamus. 3. Mild deep white matter basal ganglia and pontine signal changes nonspecific likely chronic microvascular ischemic disease. Ct Head Wo Cont Result Date: 11/19/2018 EXAM: CT HEAD WO CONT CLINICAL INDICATION/HISTORY: Ataxia, stroke suspected as etiology. -Additional: Code S COMPARISON: None. TECHNIQUE: Axial CT imaging of the head was performed without intravenous contrast. Sagittal and coronal reconstructions are provided. One or more dose reduction techniques were used on this CT: automated exposure control, adjustment of the mAs and/or kVp according to patient size, and iterative reconstruction techniques.   The specific techniques used on this CT exam have been documented in the patient's electronic medical record. _______________ FINDINGS: BRAIN AND POSTERIOR FOSSA: The sulci, folia, ventricles and basal cisterns are within normal limits for the patient's age. There is no intracranial hemorrhage, mass effect, or midline shift. There are subtle scattered and confluent foci of decreased attenuation in the periventricular white matter which are nonspecific but most likely sequelae of chronic microvascular disease. EXTRA-AXIAL SPACES AND MENINGES: There are no abnormal extra-axial fluid collections. CALVARIUM: Intact. SINUSES: Clear. OTHER: None. _______________ IMPRESSION: 1. No acute intracranial abnormalities. I communicated the above findings to Dr. Bret Lockett via telephone at 4:05 PM on 11/19/2018 per code S protocol. Xr Chest [facility] Result Date: 11/19/2018 Chest, single view Indication: Right arm tingling, slurred speech, suspected stroke Comparison: Several prior exams, most recently 11/13/2015 Findings:  Portable upright AP view of the chest was obtained. There is no focal pneumonic consolidation, pneumothorax, or pleural effusion. The cardiac size and mediastinal contours are normal. No acute osseous abnormality demonstrated. Impression: No acute radiographic cardiopulmonary abnormality. Medications Reviewed: 
Current Facility-Administered Medications Medication Dose Route Frequency  insulin lispro (HUMALOG) injection   SubCUTAneous AC&HS  
 0.9% sodium chloride infusion  100 mL/hr IntraVENous CONTINUOUS  
 amLODIPine (NORVASC) tablet 5 mg  5 mg Oral DAILY  citalopram (CELEXA) tablet 10 mg  10 mg Oral QPM  
 budesonide (PULMICORT) 500 mcg/2 ml nebulizer suspension  500 mcg Nebulization BID RT  
 LORazepam (ATIVAN) tablet 1 mg  1 mg Oral Q8H PRN  tiZANidine (ZANAFLEX) tablet 4 mg  4 mg Oral QID  PHARMACY INFORMATION NOTE  1 Each Other ONCE  
 dextrose 5% and 0.9% NaCl infusion  0.75 mL/kg/hr IntraVENous CONTINUOUS  
 ondansetron (ZOFRAN) injection 2 mg  2 mg IntraVENous Q6H PRN  
 aspirin tablet 325 mg  325 mg Oral DAILY  atorvastatin (LIPITOR) tablet 80 mg  80 mg Oral QHS  acetaminophen (TYLENOL) tablet 650 mg  650 mg Oral Q4H PRN  
 acetaminophen (TYLENOL) suppository 650 mg  650 mg Rectal Q4H PRN  
 senna-docusate (PERICOLACE) 8.6-50 mg per tablet 2 Tab  2 Tab Oral QHS  pantoprazole (PROTONIX) tablet 40 mg  40 mg Oral Q12H Or  
 pantoprazole (PROTONIX) granules for oral suspension 40 mg  40 mg Oral Q12H Or  
 pantoprazole (PROTONIX) 40 mg in sodium chloride 0.9% 10 mL injection  40 mg IntraVENous Q12H  
 albuterol (PROVENTIL VENTOLIN) nebulizer solution 2.5 mg  2.5 mg Nebulization G1K PRN  
 folic acid (FOLVITE) 1 mg, thiamine (B-1) 100 mg in 0.9% sodium chloride 50 mL ivpb   IntraVENous ACD Kadeem North PA-C [facility] Hospital[facility] Division Office:  [phone] Pager: [phone]

## 2018-11-20 NOTE — PROGRESS NOTES
1130- Patient outside talking on phone [phone]- Pt refusing cardiac monitoring and continuous IVF. [facility], [address] paged to see if orders can be discontinued. [address] returned page. Orders received to discontinue continuous IVF, and to document patient refusing tele. RBV.  
1706-Pt discharge home ambulatory has discharge instructions and belongings. Voiced understanding of discharge instructions.

## 2018-11-20 NOTE — PROGRESS NOTES
NUTRITIONPatient/Family Education Record FACTORS THAT MAY INFLUENCE PATIENTS ABILITY TO LEARN: []   Language barrier    []   Cultural needs   []   Motivation  
 []   Cognitive limitation    []   Physical   []   Education  
[]   Physiological factors   []   Hearing/vision/speaking impairment   []   Yarsanism beliefs []   Financial limitations    []  Other:   [x]   No barriers limiting ability to learn Person Instructed: [x]   Patient   []   Family   []  Other Preference for Learning: 
 [x]   Verbal   [x]   Written   []  Demonstration Patient educated on:  
[x] Cardiac/heart healthy diet [x] 2gm Sodium diet 
[] Vitamin K regulated diet (coumadin) [x] Weight loss/portion control 
[] High protein 
[] Other: Outcome:  
[x]  Patient verbalized understanding of education and willing to comply with recommendations. []  Patient declined education 
[]  Patient needs follow up education; scheduled date for follow up: 
[x]  Written information provided 
[]  RD contact information provided Gilberto Goodwin

## 2018-11-20 NOTE — ED NOTES
TRANSFER - ED to INPATIENT REPORT: 
 
SBAR report made available to receiving floor on this patient being transferred to [facility] ([id number])  for routine progression of care Admitting diagnosis TIA (transient ischemic attack) Information from the following report(s) SBAR, Kardex, ED Summary and MAR was made available to receiving floor. Lines:  
Peripheral IV 11/19/18 Right Antecubital (Active) Site Assessment Clean, dry, & intact 11/19/2018  3:15 PM  
Phlebitis Assessment 0 11/19/2018  3:15 PM  
Infiltration Assessment 0 11/19/2018  3:15 PM  
Dressing Status Clean, dry, & intact 11/19/2018  3:15 PM  
Dressing Type Transparent 11/19/2018  3:15 PM  
Hub Color/Line Status Flushed 11/19/2018  3:15 PM  
  
 
Medication list updated today Opportunity for questions and clarification was provided. Patient is oriented to time, place, person and situation Last NIH 1 Patient is  continent and ambulatory without assist 
  
Valuables transported with patient Patient transported with: 
 Monitor Registered Nurse [place] Vitals:  
 11/19/18 1814 11/19/18 1939 11/19/18 1940 11/19/18 1941 BP:   176/86 Pulse:   63 Resp:   20 Temp:   98.1 °F (36.7 °C) SpO2: 92% 97% 97% 96% Weight:      
Height:

## 2018-11-20 NOTE — PROGRESS NOTES
Received OT eval and treat orders. Screened patient to identify level of assistance needed for ADLs and functional mobility/transfers. Patient presents to be at baseline and is independent in performing ADLs and functional mobility/transfers. Pt is not appropriate for skilled OT interventions at this time. Current OT orders weill be discontinued.  
 
Thank you for this referral. 
 
Jaden Gordon, OTR/L

## 2018-11-20 NOTE — PROGRESS NOTES
Patient admitted with Dizziness and expressive aphasia Known HTN, no previous CVA Feels like speech has not fully recovered CVA workup underway H+P coming.

## 2018-11-21 ENCOUNTER — PATIENT OUTREACH (OUTPATIENT)
Dept: INTERNAL MEDICINE CLINIC | Age: 58
End: 2018-11-21

## 2018-11-21 LAB — CRP SERPL HS-MCNC: 1.19 MG/L (ref 0–3)

## 2018-11-21 NOTE — PROGRESS NOTES
11/21/18 11:21 AM 
 
Called pt on mobile number, no answer. No other number avail, no DOMITILA on file. LM w/NN contact info. Will try back later in the day. 11/21/18 2:53 PM called patient, no answer, will call Friday 11/23/18 and send letter if no response.

## 2018-11-26 ENCOUNTER — PATIENT OUTREACH (OUTPATIENT)
Dept: INTERNAL MEDICINE CLINIC | Age: 58
End: 2018-11-26

## 2018-11-26 ENCOUNTER — OFFICE VISIT (OUTPATIENT)
Dept: FAMILY MEDICINE CLINIC | Age: 58
End: 2018-11-26

## 2018-11-26 VITALS
DIASTOLIC BLOOD PRESSURE: 76 MMHG | RESPIRATION RATE: 18 BRPM | OXYGEN SATURATION: 95 % | WEIGHT: 161 LBS | HEIGHT: 67 IN | BODY MASS INDEX: 25.27 KG/M2 | SYSTOLIC BLOOD PRESSURE: 134 MMHG | HEART RATE: 58 BPM | TEMPERATURE: 95.3 F

## 2018-11-26 DIAGNOSIS — F41.9 ANXIETY: ICD-10-CM

## 2018-11-26 DIAGNOSIS — F17.210 CIGARETTE NICOTINE DEPENDENCE WITHOUT COMPLICATION: ICD-10-CM

## 2018-11-26 DIAGNOSIS — I10 ESSENTIAL HYPERTENSION: ICD-10-CM

## 2018-11-26 DIAGNOSIS — G45.9 TRANSIENT ISCHEMIC ATTACK: Primary | ICD-10-CM

## 2018-11-26 DIAGNOSIS — J45.40 MODERATE PERSISTENT ASTHMA WITHOUT COMPLICATION: ICD-10-CM

## 2018-11-26 DIAGNOSIS — M79.2 RADICULAR PAIN IN RIGHT ARM: ICD-10-CM

## 2018-11-26 RX ORDER — BUPROPION HYDROCHLORIDE 150 MG/1
150 TABLET, EXTENDED RELEASE ORAL 2 TIMES DAILY
Qty: 60 TAB | Refills: 0 | Status: SHIPPED | OUTPATIENT
Start: 2018-11-26 | End: 2019-08-22

## 2018-11-26 RX ORDER — LORAZEPAM 1 MG/1
1 TABLET ORAL
Qty: 30 TAB | Refills: 2 | Status: SHIPPED | OUTPATIENT
Start: 2018-11-26 | End: 2019-02-21 | Stop reason: SDUPTHER

## 2018-11-26 RX ORDER — CITALOPRAM 10 MG/1
10 TABLET ORAL EVERY EVENING
Qty: 90 TAB | Refills: 3 | Status: SHIPPED | OUTPATIENT
Start: 2018-11-26 | End: 2020-02-10

## 2018-11-26 NOTE — PROGRESS NOTES
Chris Graves is a 60 y.o.  male and presents with    Chief Complaint   Patient presents with   [facility] Follow Up    Nicotine Dependence    Tingling     Right arm x1 month     Admitted to [facility] 11/19/2018  Discharged to home 11/20/2018    Subjective:  Mr. Hong Luu presents for f/u after going to the ER last week c/o slurred speech and loss of balance. He was worked up for stroke and imaging was normal.  He was noted to have elevated cholesterol. He wants to quit smoking and is requesting assistance.       Anxiety Review:  Mr. Hong Luu is seen for anxiety disorder, panic attacks. Current treatment includes buspar, Ativan and no other therapies. Ongoing symptoms include: palpitations, sweating, shortness of breath, dizziness, paresthesias, insomnia, racing thoughts, psychomotor agitation, feelings of losing control, difficulty concentrating. Patient denies: suicidal ideation. Reported side effects from the treatment: GI disturbancewith his buspar.     He is also here for f/u hypertension.  he reports that his blood pressure medication caused dizziness.   He is taking medication as prescribed. [facility] has low back pain on the right side.  His hips cause pain intermittently.     Patient Active Problem List   Diagnosis Code    Asthma J45.909    Tobacco abuse Z72.0    Chronic pain G89.29    Anxiety F41.9    Bilateral primary osteoarthritis of hip M16.0    Advance care planning Z71.89    Essential hypertension I10    Status post right hip replacement Z96.641    TIA (transient ischemic attack) G45.9    Hyperlipidemia E78.5     Patient Active Problem List    Diagnosis Date Noted    Hyperlipidemia 11/20/2018    TIA (transient ischemic attack) 11/19/2018    Essential hypertension 09/07/2018    Status post right hip replacement 09/07/2018    Advance care planning 01/26/2017    Bilateral primary osteoarthritis of hip 07/14/2016    Anxiety 05/03/2016    Chronic pain 03/07/2016    Asthma 11/12/2013    Tobacco abuse 11/12/2013     Current Outpatient Medications   Medication Sig Dispense Refill    aspirin (ASPIRIN) 325 mg tablet Take 1 Tab by mouth daily. 30 Tab 1    atorvastatin (LIPITOR) 80 mg tablet Take 1 Tab by mouth nightly. 30 Tab 0    folic acid (FOLVITE) 1 mg tablet Take 1 Tab by mouth daily. 30 Tab 0    Thiamine Mononitrate (B-1) 100 mg tablet Take 1 Tab by mouth daily. 30 Tab 0    amLODIPine (NORVASC) 5 mg tablet TAKE ONE TABLET BY MOUTH ONCE DAILY 30 Tab 11    albuterol (PROVENTIL HFA, VENTOLIN HFA, PROAIR HFA) 90 mcg/actuation inhaler Take 2 Puffs by inhalation every six (6) hours as needed for Wheezing or Shortness of Breath. 1 Inhaler 12    LORazepam (ATIVAN) 1 mg tablet Take 1 Tab by mouth every eight (8) hours as needed for Anxiety. Max Daily Amount: 3 mg. 30 Tab 2    sildenafil citrate (VIAGRA) 100 mg tablet Take 1 Tab by mouth as needed.  12 Tab 1     Allergies   Allergen Reactions    Neurontin [Gabapentin] Other (comments)     Upset stomach     Past Medical History:   Diagnosis Date    Anxiety     Arthritis     osteo-hips    Asthma     as a child only    Bilateral primary osteoarthritis of hip 7/14/2016    Broken rib april 2013    3 broken ribs s/p fall with hemothorax    Chronic pain     hips, legs and knees    Hypertension     Psychiatric disorder     anxiety     Past Surgical History:   Procedure Laterality Date    HX HERNIA REPAIR Left     HX LUMBAR DISKECTOMY      HX MOHS PROCEDURES      (R) rotator cuff     Family History   Problem Relation Age of Onset    Dementia Mother     Cancer Father 61     Social History     Tobacco Use    Smoking status: Current Every Day Smoker     Packs/day: 0.25     Years: 30.00     Pack years: 7.50    Smokeless tobacco: Never Used    Tobacco comment: pt will try to quit smoking   Substance Use Topics    Alcohol use: No     Alcohol/week: 0.0 oz     Comment: quit 1/2016       ROS   General ROS: negative for - chills or fever  Ophthalmic ROS: positive for - uses glasses  ENT ROS: negative for - headaches, nasal discharge or sore throat  Endocrine ROS: negative for - polydipsia/polyuria or temperature intolerance  Respiratory ROS: no cough, shortness of breath, or wheezing  Cardiovascular ROS: no chest pain or dyspnea on exertion  Gastrointestinal ROS: no abdominal pain, change in bowel habits, or black or bloody stools  Genito-Urinary ROS: no dysuria, trouble voiding, or hematuria  Musculoskeletal ROS: positive for - pain in arm - left  Neurological ROS: negative for - numbness/tingling or weakness  Dermatological ROS: negative for - rash or skin lesion changes    All other systems reviewed and are negative. Objective:  Vitals:    11/26/18 1103   BP: 134/76   Pulse: (!) 58   Resp: 18   Temp: 95.3 °F (35.2 °C)   TempSrc: Oral   SpO2: 95%   Weight: 161 lb (73 kg)   Height: 5' 7\" (1.702 m)   PainSc:   0 - No pain        alert, well appearing, and in no distress, oriented to person, place, and time and normal appearing weight  Mental status - normal mood, behavior, speech, dress, motor activity, and thought processes  Chest - clear to auscultation, no wheezes, rales or rhonchi, symmetric air entry  Heart - normal rate, regular rhythm, normal S1, S2, no murmurs, rubs, clicks or gallops  Neurological - cranial nerves II through XII intact,  normal gait and station  Musculoskeletal - abnormal exam of left arm with ttp around bicep    LABS   HDL 66  LDL 84    TESTS  MRA brain  IMPRESSION:     1. Unremarkable MR angiogram of the intracranial vasculature. Assessment/Plan:    1. Transient ischemic attack  Symptoms resolved; continue blood pressure control    2. Cigarette nicotine dependence without complication  Counseled for >3 minutes on smoking cessation  - buPROPion SR (WELLBUTRIN SR) 150 mg SR tablet; Take 1 Tab by mouth two (2) times a day. Dispense: 60 Tab; Refill: 0    3.  Essential hypertension  Goal <130/80; borderline controlled    4. Moderate persistent asthma without complication  Continue inhaled therapy; important to quit smoking    5. Anxiety  Continue bzd with caution; maintenance with duloxetine  - LORazepam (ATIVAN) 1 mg tablet; Take 1 Tab by mouth every eight (8) hours as needed for Anxiety. Max Daily Amount: 3 mg. Dispense: 30 Tab; Refill: 2  - citalopram (CELEXA) 10 mg tablet; Take 1 Tab by mouth every evening. Dispense: 90 Tab; Refill: 3    6. Radicular pain in right arm  Continue gabapentin      Lab review: labs reviewed, I note that glycosylated hemoglobin normal, hemogram normal, TSH normal, basic metabolic panel normal, urinalysis normal      I have discussed the diagnosis with the patient and the intended plan as seen in the above orders. The patient has received an after-visit summary and questions were answered concerning future plans. I have discussed medication side effects and warnings with the patient as well. I have reviewed the plan of care with the patient, accepted their input and they are in agreement with the treatment goals. Follow-up Disposition:  Return in about 1 month (around 12/26/2018) for medication evaluation.

## 2018-11-26 NOTE — PATIENT INSTRUCTIONS
Transient Ischemic Attack: Care Instructions  Your Care Instructions    A transient ischemic attack (TIA) is when blood flow to a part of your brain is blocked for a short time. A TIA is like a stroke but usually lasts only a few minutes. A TIA does not cause lasting brain damage. Any vision problems, slurred speech, or other symptoms usually go away in 10 to 20 minutes. But they may last for up to 24 hours. TIAs are often warning signs of a stroke. Some people who have a TIA may have a stroke in the future. A stroke can cause symptoms like those of a TIA. But a stroke causes lasting damage to your brain. You can take steps to help prevent a stroke. One thing you can do is get early treatment. If you have other new symptoms, or if your symptoms do not get better, go back to the emergency room or call your doctor right away. Getting treatment right away may prevent long-term brain damage caused by a stroke. The doctor has checked you carefully, but problems can develop later. If you notice any problems or new symptoms, get medical treatment right away. Follow-up care is a key part of your treatment and safety. Be sure to make and go to all appointments, and call your doctor if you are having problems. It's also a good idea to know your test results and keep a list of the medicines you take. How can you care for yourself at home? Medicines    · Be safe with medicines. Take your medicines exactly as prescribed. Call your doctor if you think you are having a problem with your medicine.     · If you take a blood thinner, such as aspirin, be sure you get instructions about how to take your medicine safely.  Blood thinners can cause serious bleeding problems.     · Call your doctor if you are not able to take your medicines for any reason.     · Do not take any over-the-counter medicines or herbal products without talking to your doctor first.     · If you take birth control pills or hormone therapy, talk to your doctor. Ask if these treatments are right for you.    Lifestyle changes    · Do not smoke. If you need help quitting, talk to your doctor about stop-smoking programs and medicines.     · Be active. If your doctor recommends it, get more exercise. Walking is a good choice. Bit by bit, increase the amount you walk every day. Try for at least 30 minutes on most days of the week. You also may want to swim, bike, or do other activities.     · Eat heart-healthy foods. These include fruits, vegetables, high-fiber foods, fish, and foods that are low in sodium, saturated fat, and trans fat.     · Stay at a healthy weight. Lose weight if you need to.     · Limit alcohol to 2 drinks a day for men and 1 drink a day for women.    Staying healthy    · Manage other health problems such as diabetes, high blood pressure, and high cholesterol.     · Get the flu vaccine every year. When should you call for help? Call 911 anytime you think you may need emergency care. For example, call if:    · You have new or worse symptoms of a stroke. These may include:  ? Sudden numbness, tingling, weakness, or loss of movement in your face, arm, or leg, especially on only one side of your body. ? Sudden vision changes. ? Sudden trouble speaking. ? Sudden confusion or trouble understanding simple statements. ? Sudden problems with walking or balance. ? A sudden, severe headache that is different from past headaches. Call 911 even if these symptoms go away in a few minutes.     · You feel like you are having another TIA.    Watch closely for changes in your health, and be sure to contact your doctor if you have any problems. Where can you learn more? Go to http://carolina-misbah.info/. Enter [id number] in the search box to learn more about \"Transient Ischemic Attack: Care Instructions. \"  Current as of: November 21, 2017  Content Version: 11.8  © 3452-2747 Healthwise, Incorporated.  Care instructions adapted under license by [employer] (which disclaims liability or warranty for this information). If you have questions about a medical condition or this instruction, always ask your healthcare professional. [address] any warranty or liability for your use of this information. Stopping Smoking: Care Instructions  Your Care Instructions  Cigarette smokers crave the nicotine in cigarettes. Giving it up is much harder than simply changing a habit. Your body has to stop craving the nicotine. It is hard to quit, but you can do it. There are many tools that people use to quit smoking. You may find that combining tools works best for you. There are several steps to quitting. First you get ready to quit. Then you get support to help you. After that, you learn new skills and behaviors to become a nonsmoker. For many people, a necessary step is getting and using medicine. Your doctor will help you set up the plan that best meets your needs. You may want to attend a smoking cessation program to help you quit smoking. When you choose a program, look for one that has proven success. Ask your doctor for ideas. You will greatly increase your chances of success if you take medicine as well as get counseling or join a cessation program.  Some of the changes you feel when you first quit tobacco are uncomfortable. Your body will miss the nicotine at first, and you may feel short-tempered and grumpy. You may have trouble sleeping or concentrating. Medicine can help you deal with these symptoms. You may struggle with changing your smoking habits and rituals. The last step is the tricky one: Be prepared for the smoking urge to continue for a time. This is a lot to deal with, but keep at it. You will feel better. Follow-up care is a key part of your treatment and safety. Be sure to make and go to all appointments, and call your doctor if you are having problems.  It's also a good idea to know your test results and keep a list of the medicines you take. How can you care for yourself at home? · Ask your family, friends, and coworkers for support. You have a better chance of quitting if you have help and support. · Join a support group, such as Nicotine Anonymous, for people who are trying to quit smoking. · Consider signing up for a smoking cessation program, such as the American Lung Association's Freedom from Smoking program.  · Get text messaging support. Go to the website at www.smokefree. gov to sign up for the [facility] program.  · Set a quit date. Pick your date carefully so that it is not right in the middle of a big deadline or stressful time. Once you quit, do not even take a puff. Get rid of all ashtrays and lighters after your last cigarette. Clean your house and your clothes so that they do not smell of smoke. · Learn how to be a nonsmoker. Think about ways you can avoid those things that make you reach for a cigarette. ? Avoid situations that put you at greatest risk for smoking. For some people, it is hard to have a drink with friends without smoking. For others, they might skip a coffee break with coworkers who smoke. ? Change your daily routine. Take a different route to work or eat a meal in a different place. · Cut down on stress. Calm yourself or release tension by doing an activity you enjoy, such as reading a book, taking a hot bath, or gardening. · Talk to your doctor or pharmacist about nicotine replacement therapy, which replaces the nicotine in your body. You still get nicotine but you do not use tobacco. Nicotine replacement products help you slowly reduce the amount of nicotine you need. These products come in several forms, many of them available over-the-counter:  ? Nicotine patches  ? Nicotine gum and lozenges  ? Nicotine inhaler  · Ask your doctor about bupropion (Wellbutrin) or varenicline (Chantix), which are prescription medicines. They do not contain nicotine.  They help you by reducing withdrawal symptoms, such as stress and anxiety. · Some people find hypnosis, acupuncture, and massage helpful for ending the smoking habit. · Eat a healthy diet and get regular exercise. Having healthy habits will help your body move past its craving for nicotine. · Be prepared to keep trying. Most people are not successful the first few times they try to quit. Do not get mad at yourself if you smoke again. Make a list of things you learned and think about when you want to try again, such as next week, next month, or next year. Where can you learn more? Go to http://carolinaLabochemamisbah.info/. Enter S468 in the search box to learn more about \"Stopping Smoking: Care Instructions. \"  Current as of: November 29, 2017  Content Version: 11.8  © 5146-5277 Healthwise, Incorporated. Care instructions adapted under license by [employer] (which disclaims liability or warranty for this information). If you have questions about a medical condition or this instruction, always ask your healthcare professional. [address] any warranty or liability for your use of this information.

## 2018-12-07 NOTE — DISCHARGE SUMMARY
[facility]  MR#: [id number]  : 1960  ACCOUNT #: [de-identified]   ADMIT DATE: 2018  DISCHARGE DATE: 2018    ADMITTING DIAGNOSIS:  TIA. HISTORY OF PRESENT ILLNESS:  The patient is a 58-year-old male who presented to the emergency department with difficulty feeling dizzy and bumping into things. He said that he briefly had trouble getting his words out. This began earlier in the day of admission. He presented to the emergency room where he was seen by tele neurology. It was felt that he did not have evidence to need TPA. He was admitted for ongoing management. HOSPITAL COURSE:  The patient rapidly improved. MRI testing demonstrated that he had chronic infarct areas with minimal area of subacute infarct. His symptoms fully resolved. He was started on Lipitor. MRA testing showed no abnormalities. He was also started on aspirin. By 2018, he was considered ready for discharge. He was discharged home. DISCHARGE DIAGNOSES:  1. Transient ischemic attack, resolved. 2.  Hypertension. 3.  Hyperlipidemia. CONDITION:  Improved. ACTIVITY:  Ad emmy. FOLLOWUP:  With his primary care provider in 1 week. Patient will arrange. DISCHARGE MEDICATIONS:  1. Albuterol 2 puffs by inhaler every 6 hours as needed for wheezing. 2.  Norvasc 5 mg by mouth daily. 3.  Aspirin 325 mg by mouth daily. 4.  Atorvastatin 80 mg by mouth nightly. 5.  Folic acid 1 mg by mouth daily. 6.  Viagra as previously prescribed. 7.  Thiamine 100 mg by mouth daily. DIET:  Cardiac. DISPOSITION:  Home      MD CHAO Lee/TN  D: 2018 10:31     T: 2018 12:10  JOB #: [id number]

## 2018-12-27 RX ORDER — ASPIRIN 325 MG/1
100 TABLET, FILM COATED ORAL DAILY
Qty: 30 TAB | Refills: 0 | Status: SHIPPED | OUTPATIENT
Start: 2018-12-27 | End: 2019-01-14 | Stop reason: SDUPTHER

## 2018-12-27 RX ORDER — FOLIC ACID 1 MG/1
1 TABLET ORAL DAILY
Qty: 30 TAB | Refills: 0 | Status: SHIPPED | OUTPATIENT
Start: 2018-12-27 | End: 2019-01-14 | Stop reason: SDUPTHER

## 2019-01-02 ENCOUNTER — TELEPHONE (OUTPATIENT)
Dept: FAMILY MEDICINE CLINIC | Age: 59
End: 2019-01-02

## 2019-01-04 ENCOUNTER — OFFICE VISIT (OUTPATIENT)
Dept: FAMILY MEDICINE CLINIC | Age: 59
End: 2019-01-04

## 2019-01-04 VITALS
BODY MASS INDEX: 25.43 KG/M2 | TEMPERATURE: 98.8 F | OXYGEN SATURATION: 95 % | RESPIRATION RATE: 14 BRPM | SYSTOLIC BLOOD PRESSURE: 152 MMHG | DIASTOLIC BLOOD PRESSURE: 82 MMHG | WEIGHT: 162 LBS | HEART RATE: 74 BPM | HEIGHT: 67 IN

## 2019-01-04 DIAGNOSIS — M54.6 ACUTE LEFT-SIDED THORACIC BACK PAIN: Primary | ICD-10-CM

## 2019-01-04 RX ORDER — LIDOCAINE 50 MG/G
PATCH TOPICAL
Qty: 30 EACH | Refills: 0 | Status: SHIPPED | OUTPATIENT
Start: 2019-01-04

## 2019-01-04 RX ORDER — TIZANIDINE 4 MG/1
4 TABLET ORAL
Qty: 30 TAB | Refills: 0 | Status: SHIPPED | OUTPATIENT
Start: 2019-01-04 | End: 2019-08-22 | Stop reason: ALTCHOICE

## 2019-01-04 RX ORDER — TRAMADOL HYDROCHLORIDE 50 MG/1
50 TABLET ORAL
Qty: 15 TAB | Refills: 0 | Status: SHIPPED | OUTPATIENT
Start: 2019-01-04 | End: 2019-01-14 | Stop reason: SDUPTHER

## 2019-01-04 NOTE — PROGRESS NOTES
Room 7    Patient presents to the clinic for left shoulder pain that began 1 week ago. Patient complains of swelling . Patient states he tried biofreeze, icy hot, heating pads and received no relief.

## 2019-01-04 NOTE — PROGRESS NOTES
HISTORY OF PRESENT ILLNESS  Chris Parson is a 60 y.o. male. HPI  Mr. Carolyn Gregory presents for left shoulder blade pain x 1 week. He reports his pain began ~1 hour after working on a project with his son. He believes he may have strained it. No hx of similar in the past.   - He has been taking OTC Ibuprofen 800 mg every 6 hours and Zanaflex 4 mg (10 in the past 4 days). He has also tried OTC patches which are not helping. The Ibuprofen helps a little. - Hx TIA ~2 months ago. He takes Amlodipine for HTN. Follows with his PCP Dr. Carly Rosado. Review of Systems   Musculoskeletal: Positive for back pain (left thoracic). Negative for neck pain. Neurological: Positive for tingling (right arm - taking Neurontin via Dr. Carly Rosado). /82 (BP 1 Location: Right arm, BP Patient Position: Sitting)   Pulse 74   Temp 98.8 °F (37.1 °C) (Oral)   Resp 14   Ht 5' 7\" (1.702 m)   Wt 162 lb (73.5 kg)   SpO2 95%   BMI 25.37 kg/m²     Physical Exam   Constitutional: He is oriented to person, place, and time. He appears well-developed and well-nourished. No distress. HENT:   Head: Normocephalic and atraumatic. Right Ear: Tympanic membrane, external ear and ear canal normal.   Left Ear: Tympanic membrane, external ear and ear canal normal.   Nose: Nose normal.   Mouth/Throat: Uvula is midline, oropharynx is clear and moist and mucous membranes are normal. No oropharyngeal exudate, posterior oropharyngeal edema, posterior oropharyngeal erythema or tonsillar abscesses. Eyes: Conjunctivae are normal. Pupils are equal, round, and reactive to light. No scleral icterus. Neck: Neck supple. Cardiovascular: Normal rate, regular rhythm and normal heart sounds. Exam reveals no gallop. No murmur heard. Pulses:       Dorsalis pedis pulses are 2+ on the right side, and 2+ on the left side. Posterior tibial pulses are 2+ on the right side, and 2+ on the left side. No pedal edema.    Pulmonary/Chest: Effort normal and breath sounds normal. No respiratory distress. He has no decreased breath sounds. He has no wheezes. He has no rhonchi. He has no rales. Musculoskeletal:        Left shoulder: He exhibits decreased range of motion (Pain with full abduction. ). He exhibits no tenderness and no bony tenderness. Cervical back: He exhibits decreased range of motion (decreased extension secondary to discomfort). Back:    Lymphadenopathy:        Head (right side): No submandibular and no tonsillar adenopathy present. Head (left side): No submandibular and no tonsillar adenopathy present. He has no cervical adenopathy. Right: No supraclavicular adenopathy present. Left: No supraclavicular adenopathy present. Neurological: He is alert and oriented to person, place, and time. Reflex Scores:       Bicep reflexes are 2+ on the right side and 2+ on the left side. Brachioradialis reflexes are 1+ on the right side and 1+ on the left side. Skin: Skin is warm and dry. Psychiatric: He has a normal mood and affect. His speech is normal.       ASSESSMENT and PLAN  Orders Placed This Encounter    [facility]     Referral Priority:   Routine     Referral Type:   PT/OT/ST     Referral Reason:   Specialty Services Required     Number of Visits Requested:   1    traMADol (ULTRAM) 50 mg tablet     Sig: Take 1 Tab by mouth every six (6) hours as needed for Pain. Max Daily Amount: 200 mg. Dispense:  15 Tab     Refill:  0    lidocaine (LIDODERM) 5 %     Sig: Apply patch to the affected area for 12 hours a day and remove for 12 hours a day. Dispense:  30 Each     Refill:  0    tiZANidine (ZANAFLEX) 4 mg tablet     Sig: Take 1 Tab by mouth every six (6) hours as needed. Dispense:  30 Tab     Refill:  0     Diagnoses and all orders for this visit:    1. Acute left-sided thoracic back pain  -     traMADol (ULTRAM) 50 mg tablet; Take 1 Tab by mouth every six (6) hours as needed for Pain. Max Daily Amount: 200 mg.  -     lidocaine (LIDODERM) 5 %; Apply patch to the affected area for 12 hours a day and remove for 12 hours a day. -     REFERRAL TO PHYSICAL THERAPY  -     tiZANidine (ZANAFLEX) 4 mg tablet; Take 1 Tab by mouth every six (6) hours as needed. - Advised to avoid Ibuprofen given recent TIA. Patient to f/u with Dr. Janna Shi regarding right arm paresthesias. Advised he may need an MRI C-spine. Follow-up Disposition:  Return if symptoms worsen or fail to improve.

## 2019-01-08 ENCOUNTER — DOCUMENTATION ONLY (OUTPATIENT)
Dept: FAMILY MEDICINE CLINIC | Age: 59
End: 2019-01-08

## 2019-01-08 NOTE — PROGRESS NOTES
Prior Authorization for Lidocaine 5% patch started on 1/8/2019 via covermymeds. Awaiting reply from pt's insurance company via fax/e-mail. Allow 48-72 hours. Preventive measure

## 2019-01-09 ENCOUNTER — TELEPHONE (OUTPATIENT)
Dept: FAMILY MEDICINE CLINIC | Age: 59
End: 2019-01-09

## 2019-01-09 NOTE — TELEPHONE ENCOUNTER
Called patient in regards to prior authorization, no answer. Message left to call back when possible.

## 2019-01-14 ENCOUNTER — OFFICE VISIT (OUTPATIENT)
Dept: FAMILY MEDICINE CLINIC | Age: 59
End: 2019-01-14

## 2019-01-14 VITALS
WEIGHT: 162.8 LBS | TEMPERATURE: 96 F | SYSTOLIC BLOOD PRESSURE: 148 MMHG | HEART RATE: 60 BPM | DIASTOLIC BLOOD PRESSURE: 81 MMHG | BODY MASS INDEX: 25.55 KG/M2 | OXYGEN SATURATION: 97 % | RESPIRATION RATE: 18 BRPM | HEIGHT: 67 IN

## 2019-01-14 DIAGNOSIS — M79.18 MYOFASCIAL PAIN ON LEFT SIDE: Primary | ICD-10-CM

## 2019-01-14 DIAGNOSIS — G45.9 TIA (TRANSIENT ISCHEMIC ATTACK): ICD-10-CM

## 2019-01-14 DIAGNOSIS — M54.6 ACUTE LEFT-SIDED THORACIC BACK PAIN: ICD-10-CM

## 2019-01-14 RX ORDER — TRIAMCINOLONE ACETONIDE 40 MG/ML
40 INJECTION, SUSPENSION INTRA-ARTICULAR; INTRAMUSCULAR ONCE
Qty: 1 ML | Refills: 0
Start: 2019-01-14 | End: 2019-01-14

## 2019-01-14 RX ORDER — TRAMADOL HYDROCHLORIDE 50 MG/1
50 TABLET ORAL
Qty: 15 TAB | Refills: 0 | Status: SHIPPED | OUTPATIENT
Start: 2019-01-14 | End: 2019-02-21 | Stop reason: SDUPTHER

## 2019-01-14 RX ORDER — ATORVASTATIN CALCIUM 80 MG/1
80 TABLET, FILM COATED ORAL
Qty: 90 TAB | Refills: 3 | Status: SHIPPED | OUTPATIENT
Start: 2019-01-14 | End: 2020-04-09

## 2019-01-14 RX ORDER — ASPIRIN 325 MG/1
100 TABLET, FILM COATED ORAL DAILY
Qty: 90 TAB | Refills: 3 | Status: SHIPPED | OUTPATIENT
Start: 2019-01-14 | End: 2019-11-13 | Stop reason: SDUPTHER

## 2019-01-14 RX ORDER — FOLIC ACID 1 MG/1
1 TABLET ORAL DAILY
Qty: 90 TAB | Refills: 3 | Status: SHIPPED | OUTPATIENT
Start: 2019-01-14 | End: 2019-11-13 | Stop reason: SDUPTHER

## 2019-01-14 NOTE — PROGRESS NOTES
Chief Complaint   Patient presents with    Shoulder Pain     1. Have you been to the ER, urgent care clinic since your last visit? Hospitalized since your last visit? No    2. Have you seen or consulted any other health care providers outside of the [address] [place] [address] since your last visit? Include any pap smears or colon screening.  No

## 2019-01-14 NOTE — PROGRESS NOTES
Lorraine Ferrer is a 60 y.o.  male and presents with    Chief Complaint   Patient presents with    Shoulder Pain     Subjective:  Mr. Tam Dean presents for f/u left shoulder blade pain x 3 weeks. pain increases when he flexes his neck; pain increases when he lifts the left arm. He has paresthesia in the right arm and hand. He reports that he woke with the pain. No hx of similar in the past.   - He has been taking OTC Ibuprofen 800 mg every 6 hours and Zanaflex 4 mg (10 in the past 4 days). he has used otc arctic ice. He was seen in office last week. He has also tried OTC patches which are not helping. The Ibuprofen helps a little. Anxiety Review:  He is seen for anxiety disorder, panic attacks. Current treatment includes buspar, Ativan and no other therapies. Ongoing symptoms include: palpitations, sweating, shortness of breath, dizziness, paresthesias, insomnia, racing thoughts, psychomotor agitation, feelings of losing control, difficulty concentrating. Patient denies: suicidal ideation. Reported side effects from the treatment: GI disturbancewith his buspar.     Patient Active Problem List   Diagnosis Code    Asthma J45.909    Tobacco abuse Z72.0    Chronic pain G89.29    Anxiety F41.9    Bilateral primary osteoarthritis of hip M16.0    Advance care planning Z71.89    Essential hypertension I10    Status post right hip replacement Z96.641    TIA (transient ischemic attack) G45.9    Hyperlipidemia E78.5     Patient Active Problem List    Diagnosis Date Noted    Hyperlipidemia 11/20/2018    TIA (transient ischemic attack) 11/19/2018    Essential hypertension 09/07/2018    Status post right hip replacement 09/07/2018    Advance care planning 01/26/2017    Bilateral primary osteoarthritis of hip 07/14/2016    Anxiety 05/03/2016    Chronic pain 03/07/2016    Asthma 11/12/2013    Tobacco abuse 11/12/2013     Current Outpatient Medications   Medication Sig Dispense Refill  tiZANidine (ZANAFLEX) 4 mg tablet Take 1 Tab by mouth every six (6) hours as needed. 30 Tab 0    thiamine mononitrate (B-1) 100 mg tablet Take 1 Tab by mouth daily. 30 Tab 0    folic acid (FOLVITE) 1 mg tablet Take 1 Tab by mouth daily. 30 Tab 0    LORazepam (ATIVAN) 1 mg tablet Take 1 Tab by mouth every eight (8) hours as needed for Anxiety. Max Daily Amount: 3 mg. 30 Tab 2    citalopram (CELEXA) 10 mg tablet Take 1 Tab by mouth every evening. 90 Tab 3    aspirin (ASPIRIN) 325 mg tablet Take 1 Tab by mouth daily. 30 Tab 1    atorvastatin (LIPITOR) 80 mg tablet Take 1 Tab by mouth nightly. 30 Tab 0    amLODIPine (NORVASC) 5 mg tablet TAKE ONE TABLET BY MOUTH ONCE DAILY 30 Tab 11    albuterol (PROVENTIL HFA, VENTOLIN HFA, PROAIR HFA) 90 mcg/actuation inhaler Take 2 Puffs by inhalation every six (6) hours as needed for Wheezing or Shortness of Breath. 1 Inhaler 12    traMADol (ULTRAM) 50 mg tablet Take 1 Tab by mouth every six (6) hours as needed for Pain. Max Daily Amount: 200 mg. 15 Tab 0    lidocaine (LIDODERM) 5 % Apply patch to the affected area for 12 hours a day and remove for 12 hours a day. 30 Each 0    buPROPion SR (WELLBUTRIN SR) 150 mg SR tablet Take 1 Tab by mouth two (2) times a day. 60 Tab 0    sildenafil citrate (VIAGRA) 100 mg tablet Take 1 Tab by mouth as needed.  12 Tab 1     Allergies   Allergen Reactions    Neurontin [Gabapentin] Other (comments)     Upset stomach     Past Medical History:   Diagnosis Date    Anxiety     Arthritis     osteo-hips    Asthma     as a child only    Bilateral primary osteoarthritis of hip 7/14/2016    Broken rib april 2013    3 broken ribs s/p fall with hemothorax    Chronic pain     hips, legs and knees    Hypertension     Psychiatric disorder     anxiety     Past Surgical History:   Procedure Laterality Date    HX HERNIA REPAIR Left     HX LUMBAR DISKECTOMY      HX MOHS PROCEDURES      (R) rotator cuff     Family History   Problem Relation Age of Onset    Dementia Mother     Cancer Father 61     Social History     Tobacco Use    Smoking status: Current Every Day Smoker     Packs/day: 0.25     Years: 30.00     Pack years: 7.50    Smokeless tobacco: Never Used    Tobacco comment: pt will try to quit smoking   Substance Use Topics    Alcohol use: No     Alcohol/week: 0.0 oz     Comment: quit 1/2016       ROS   General ROS: negative for - chills or fever  Ophthalmic ROS: positive for - uses glasses  ENT ROS: negative for - headaches, nasal discharge or sore throat  Endocrine ROS: negative for - polydipsia/polyuria or temperature intolerance  Respiratory ROS: no cough, shortness of breath, or wheezing  Cardiovascular ROS: no chest pain or dyspnea on exertion  Gastrointestinal ROS: no abdominal pain, change in bowel habits, or black or bloody stools  Genito-Urinary ROS: no dysuria, trouble voiding, or hematuria  Musculoskeletal ROS: positive for - pain in arm - left  Dermatological ROS: negative for - rash or skin lesion changes    All other systems reviewed and are negative.       Objective:  Vitals:    01/14/19 1302   BP: 148/81   Pulse: 60   Resp: 18   Temp: 96 °F (35.6 °C)   TempSrc: Oral   SpO2: 97%   Weight: 162 lb 12.8 oz (73.8 kg)   Height: 5' 7\" (1.702 m)   PainSc:  10 - Worst pain ever   PainLoc: Shoulder        alert, well appearing, and in no distress, oriented to person, place, and time and normal appearing weight  Mental status - normal mood, behavior, speech, dress, motor activity, and thought processes  Chest - clear to auscultation, no wheezes, rales or rhonchi, symmetric air entry  Heart - normal rate, regular rhythm, normal S1, S2, no murmurs, rubs, clicks or gallops  Neurological - cranial nerves II through XII intact,  normal gait and station  Musculoskeletal - abnormal exam of left arm with ttp around bicep        [facility]        Chart reviewed for the following:   I, Jessie Sailors, MD, have reviewed the History, Physical and updated the Allergic reactions for [address] performed immediately prior to start of procedure:   I, Jeferson Ndiaye MD, have performed the following reviews on Otto Goltz prior to the start of the procedure:            * Patient was identified by name and date of birth   * Agreement on procedure being performed was verified  * Risks and Benefits explained to the patient  * Procedure site verified and marked as necessary  * Patient was positioned for comfort  * Understanding confirmed and consent was signed and verified     Time: .1:32 PM       Date of procedure: 1/14/2019    Procedure performed by:  Jeferson Ndiaye MD    Provider assisted by: Jesus Rivera LPN    Patient assisted by: self    How tolerated by patient: tolerated the procedure well with no complications    Comments: none    after obtaining informed consent the skin over left trapezius and upper back was prepped in sterile fashion; ethyl chloride used for topical anesthesia; 1 cc 1:1:1 marcaine 0.5%, lidocaine 1% without epi and kenalog 40 mg/cc injected into 3 individual trigger points; EBL < 1 cc; post procedure pain 3/10      Assessment/Plan:    1. Myofascial pain on left side    - INJECT TRIGGER POINTS, > 3  - TRIAMCINOLONE ACETONIDE INJ  - triamcinolone acetonide (KENALOG) 40 mg/mL injection; 1 mL by IntraMUSCular route once for 1 dose. Dispense: 1 mL; Refill: 0  - traMADol (ULTRAM) 50 mg tablet; Take 1 Tab by mouth every six (6) hours as needed for Pain. Max Daily Amount: 200 mg. Dispense: 15 Tab; Refill: 0    2. TIA (transient ischemic attack)  Continue medications to decrease risk of further events  - folic acid (FOLVITE) 1 mg tablet; Take 1 Tab by mouth daily. Dispense: 90 Tab; Refill: 3  - thiamine mononitrate (B-1) 100 mg tablet; Take 1 Tab by mouth daily. Dispense: 90 Tab; Refill: 3  - atorvastatin (LIPITOR) 80 mg tablet; Take 1 Tab by mouth nightly. Dispense: 90 Tab; Refill: 3    3. Acute left-sided thoracic back pain  Immediate improvement with injections  - traMADol (ULTRAM) 50 mg tablet; Take 1 Tab by mouth every six (6) hours as needed for Pain. Max Daily Amount: 200 mg. Dispense: 15 Tab; Refill: 0      Lab review: no lab studies available for review at time of visit      I have discussed the diagnosis with the patient and the intended plan as seen in the above orders. The patient has received an after-visit summary and questions were answered concerning future plans. I have discussed medication side effects and warnings with the patient as well. I have reviewed the plan of care with the patient, accepted their input and they are in agreement with the treatment goals. Follow-up Disposition:  Return if symptoms worsen or fail to improve.

## 2019-02-21 ENCOUNTER — OFFICE VISIT (OUTPATIENT)
Dept: FAMILY MEDICINE CLINIC | Age: 59
End: 2019-02-21

## 2019-02-21 VITALS
TEMPERATURE: 96 F | HEART RATE: 55 BPM | HEIGHT: 67 IN | BODY MASS INDEX: 25.27 KG/M2 | SYSTOLIC BLOOD PRESSURE: 140 MMHG | OXYGEN SATURATION: 98 % | WEIGHT: 161 LBS | DIASTOLIC BLOOD PRESSURE: 71 MMHG | RESPIRATION RATE: 16 BRPM

## 2019-02-21 DIAGNOSIS — L57.0 ACTINIC KERATOSES: ICD-10-CM

## 2019-02-21 DIAGNOSIS — R20.2 PARESTHESIA OF BOTH HANDS: Primary | ICD-10-CM

## 2019-02-21 DIAGNOSIS — M79.18 MYOFASCIAL PAIN ON LEFT SIDE: ICD-10-CM

## 2019-02-21 DIAGNOSIS — F41.9 ANXIETY: ICD-10-CM

## 2019-02-21 DIAGNOSIS — M54.6 ACUTE LEFT-SIDED THORACIC BACK PAIN: ICD-10-CM

## 2019-02-21 RX ORDER — LORAZEPAM 1 MG/1
1 TABLET ORAL
Qty: 30 TAB | Refills: 2 | Status: SHIPPED | OUTPATIENT
Start: 2019-02-21 | End: 2019-05-31 | Stop reason: SDUPTHER

## 2019-02-21 RX ORDER — TRAMADOL HYDROCHLORIDE 50 MG/1
50 TABLET ORAL
Qty: 15 TAB | Refills: 0 | Status: SHIPPED | OUTPATIENT
Start: 2019-02-21 | End: 2019-03-08 | Stop reason: SDUPTHER

## 2019-02-21 RX ORDER — IMIQUIMOD 12.5 MG/.25G
0.5 CREAM TOPICAL DAILY
Qty: 24 PACKET | Refills: 6 | Status: SHIPPED | OUTPATIENT
Start: 2019-02-21 | End: 2021-01-14

## 2019-02-21 NOTE — PATIENT INSTRUCTIONS
Numbness and Tingling: Care Instructions Your Care Instructions Many things can cause numbness or tingling. Swelling may put pressure on a nerve. This could cause you to lose feeling or have a pins-and-needles sensation on part of your body. Nerves may be damaged from trauma, toxins, or diseases, such as diabetes or multiple sclerosis (MS). Sometimes, though, the cause is not clear. If there is no clear reason for your symptoms, and you are not having any other symptoms, your doctor may suggest watching and waiting for a while to see if the numbness or tingling goes away on its own. Your doctor may want you to have blood or nerve tests to find the cause of your symptoms. Follow-up care is a key part of your treatment and safety. Be sure to make and go to all appointments, and call your doctor if you are having problems. It's also a good idea to know your test results and keep a list of the medicines you take. How can you care for yourself at home? · If your doctor prescribes medicine, take it exactly as directed. Call your doctor if you think you are having a problem with your medicine. · If you have any swelling, put ice or a cold pack on the area for 10 to 20 minutes at a time. Put a thin cloth between the ice and your skin. When should you call for help? Call 911 anytime you think you may need emergency care. For example, call if: 
  · You have weakness, numbness, or tingling in both legs.  
  · You lose bowel or bladder control.  
  · You have symptoms of a stroke. These may include: 
? Sudden numbness, tingling, weakness, or loss of movement in your face, arm, or leg, especially on only one side of your body. ? Sudden vision changes. ? Sudden trouble speaking. ? Sudden confusion or trouble understanding simple statements. ? Sudden problems with walking or balance. ? A sudden, severe headache that is different from past headaches.  Watch closely for changes in your health, and be sure to contact your doctor if you have any problems, or if: 
  · You do not get better as expected. Where can you learn more? Go to http://carolina-misbah.info/. Enter D041 in the search box to learn more about \"Numbness and Tingling: Care Instructions. \" Current as of: Gilma 3, 2018 Content Version: 11.9 © 4282-0942 [employer]. Care instructions adapted under license by [employer] (which disclaims liability or warranty for this information). If you have questions about a medical condition or this instruction, always ask your healthcare professional. [address] any warranty or liability for your use of this information.

## 2019-02-21 NOTE — PROGRESS NOTES
Shanique Vasquez is a 60 y.o.  male and presents with Chief Complaint Patient presents with  Tingling  
  hands bilateral  
 Medication Refill  
  aldara cream 5% and tramadol, ativan Subjective: 
Mr. Majo Patel presents c/o bilateral hand paresthesias which is greatest in the morning when he wakes. He reports that his hands feel cold. 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 
Patient has osteoarthritis, primarily affecting the neck. Symptoms onset: problem is longstanding. Rheumatological ROS: increasing significant pain in neck. Response to treatment plan: gradually worsening. Anxiety Review: He is seen for anxiety disorder, panic attacks. Current treatment includes buspar, Ativan and no other therapies. Ongoing symptoms include: palpitations, sweating, shortness of breath, dizziness, paresthesias, insomnia, racing thoughts, psychomotor agitation, feelings of losing control, difficulty concentrating. Patient denies: suicidal ideation. Reported side effects from the treatment: GI disturbancewith his buspar. He needs topical treatment for basal cell. Patient Active Problem List  
Diagnosis Code  Asthma J45.909  Tobacco abuse Z72.0  Chronic pain G89.29  
 Anxiety F41.9  Bilateral primary osteoarthritis of hip M16.0  Advance care planning Z71.89  
 Essential hypertension I10  Status post right hip replacement Z96.641  TIA (transient ischemic attack) G45.9  Hyperlipidemia E78.5 Patient Active Problem List  
 Diagnosis Date Noted  Hyperlipidemia 11/20/2018  TIA (transient ischemic attack) 11/19/2018  Essential hypertension 09/07/2018  Status post right hip replacement 09/07/2018  Advance care planning 01/26/2017  Bilateral primary osteoarthritis of hip 07/14/2016  Anxiety 05/03/2016  Chronic pain 03/07/2016  Asthma 11/12/2013  Tobacco abuse 11/12/2013 Current Outpatient Medications Medication Sig Dispense Refill  folic acid (FOLVITE) 1 mg tablet Take 1 Tab by mouth daily. 90 Tab 3  
 thiamine mononitrate (B-1) 100 mg tablet Take 1 Tab by mouth daily. 90 Tab 3  
 atorvastatin (LIPITOR) 80 mg tablet Take 1 Tab by mouth nightly. 90 Tab 3  
 traMADol (ULTRAM) 50 mg tablet Take 1 Tab by mouth every six (6) hours as needed for Pain. Max Daily Amount: 200 mg. 15 Tab 0  
 lidocaine (LIDODERM) 5 % Apply patch to the affected area for 12 hours a day and remove for 12 hours a day. 30 Each 0  
 tiZANidine (ZANAFLEX) 4 mg tablet Take 1 Tab by mouth every six (6) hours as needed. 30 Tab 0  
 LORazepam (ATIVAN) 1 mg tablet Take 1 Tab by mouth every eight (8) hours as needed for Anxiety. Max Daily Amount: 3 mg. 30 Tab 2  
 citalopram (CELEXA) 10 mg tablet Take 1 Tab by mouth every evening. 90 Tab 3  
 buPROPion SR (WELLBUTRIN SR) 150 mg SR tablet Take 1 Tab by mouth two (2) times a day. 60 Tab 0  
 aspirin (ASPIRIN) 325 mg tablet Take 1 Tab by mouth daily. 30 Tab 1  
 amLODIPine (NORVASC) 5 mg tablet TAKE ONE TABLET BY MOUTH ONCE DAILY 30 Tab 11  
 albuterol (PROVENTIL HFA, VENTOLIN HFA, PROAIR HFA) 90 mcg/actuation inhaler Take 2 Puffs by inhalation every six (6) hours as needed for Wheezing or Shortness of Breath. 1 Inhaler 12  
 sildenafil citrate (VIAGRA) 100 mg tablet Take 1 Tab by mouth as needed. 12 Tab 1 Allergies Allergen Reactions  Neurontin [Gabapentin] Other (comments) Upset stomach Past Medical History:  
Diagnosis Date  Anxiety  Arthritis   
 osteo-hips  Asthma   
 as a child only  Bilateral primary osteoarthritis of hip 7/14/2016  Broken rib april 2013  
 3 broken ribs s/p fall with hemothorax  Chronic pain   
 hips, legs and knees  Hypertension  Psychiatric disorder   
 anxiety Past Surgical History:  
Procedure Laterality Date  HX HERNIA REPAIR Left  HX LUMBAR DISKECTOMY  HX MOHS PROCEDURES    
 (R) rotator cuff Family History Problem Relation Age of Onset  Dementia Mother  Cancer Father 61 Social History Tobacco Use  Smoking status: Current Every Day Smoker Packs/day: 0.25 Years: 30.00 Pack years: 7.50  Smokeless tobacco: Never Used  Tobacco comment: pt will try to quit smoking Substance Use Topics  Alcohol use: No  
  Alcohol/week: 0.0 oz  
  Comment: quit 1/2016 ROS General ROS: negative for - chills or fever Ophthalmic ROS: positive for - uses glasses ENT ROS: negative for - headaches, nasal discharge or sore throat Endocrine ROS: negative for - polydipsia/polyuria or temperature intolerance Respiratory ROS: no cough, shortness of breath, or wheezing Cardiovascular ROS: no chest pain or dyspnea on exertion Gastrointestinal ROS: no abdominal pain, change in bowel habits, or black or bloody stools Genito-Urinary ROS: no dysuria, trouble voiding, or hematuria Musculoskeletal ROS: positive for - pain in arm - left Dermatological ROS: negative for - rash or skin lesion changes All other systems reviewed and are negative. Objective: 
Vitals:  
 02/21/19 1552 BP: 140/71 Pulse: (!) 55 Resp: 16 Temp: 96 °F (35.6 °C) TempSrc: Oral  
SpO2: 98% Weight: 161 lb (73 kg) Height: 5' 7\" (1.702 m) PainSc:  10 - Worst pain ever PainLoc: Back  
 
alert, well appearing, and in no distress, oriented to person, place, and time and normal appearing weight Mental status - normal mood, behavior, speech, dress, motor activity, and thought processes Chest - clear to auscultation, no wheezes, rales or rhonchi, symmetric air entry Heart - normal rate, regular rhythm, normal S1, S2, no murmurs, rubs, clicks or gallops Neurological - cranial nerves II through XII intact,  normal gait and station Musculoskeletal - abnormal exam of left arm with ttp around bicep Assessment/Plan: 1. Paresthesia of both hands Worsening symptoms; need emg and imaging 
- REFERRAL TO NEUROLOGY 
- XR SPINE CERV 4 OR 5 V; Future 2. Anxiety Continue bzd with caution - LORazepam (ATIVAN) 1 mg tablet; Take 1 Tab by mouth every eight (8) hours as needed for Anxiety. Max Daily Amount: 3 mg. Dispense: 30 Tab; Refill: 2 3. Acute left-sided thoracic back pain This is a chronic problem that is worsened. Per review of available records and patients , there are not sign of overuse, misuse, diversion, or concerning side effects. Today we reviewed: the risk of overdose, addiction, and dependency  The following changes were made to the patients current treatment plan: nothing, medications refilled. - traMADol (ULTRAM) 50 mg tablet; Take 1 Tab by mouth every six (6) hours as needed for Pain. Max Daily Amount: 200 mg. Dispense: 15 Tab; Refill: 0 
 
4. Myofascial pain on left side Pain management 
- traMADol (ULTRAM) 50 mg tablet; Take 1 Tab by mouth every six (6) hours as needed for Pain. Max Daily Amount: 200 mg. Dispense: 15 Tab; Refill: 0 
 
5. Actinic keratoses Continue topical therapy 
- imiquimod (ALDARA) 5 % cream; Apply 0.5 Packets to affected area daily. apply a thin layer as directed  Dispense: 24 Packet; Refill: 6 Lab review: no lab studies available for review at time of visit I have discussed the diagnosis with the patient and the intended plan as seen in the above orders. The patient has received an after-visit summary and questions were answered concerning future plans. I have discussed medication side effects and warnings with the patient as well. I have reviewed the plan of care with the patient, accepted their input and they are in agreement with the treatment goals. Follow-up Disposition: 
Return in about 2 months (around 4/21/2019), or if symptoms worsen or fail to improve, for medication evaluation.

## 2019-02-22 ENCOUNTER — HOSPITAL ENCOUNTER (OUTPATIENT)
Dept: GENERAL RADIOLOGY | Age: 59
Discharge: HOME | End: 2019-02-22
Payer: MEDICARE

## 2019-02-22 DIAGNOSIS — R20.2 PARESTHESIA OF BOTH HANDS: ICD-10-CM

## 2019-02-22 PROCEDURE — 72050 X-RAY EXAM NECK SPINE 4/5VWS: CPT

## 2019-02-25 ENCOUNTER — TELEPHONE (OUTPATIENT)
Dept: FAMILY MEDICINE CLINIC | Age: 59
End: 2019-02-25

## 2019-02-26 ENCOUNTER — PATIENT OUTREACH (OUTPATIENT)
Dept: FAMILY MEDICINE CLINIC | Age: 59
End: 2019-02-26

## 2019-02-26 DIAGNOSIS — M48.02 CERVICAL SPINAL STENOSIS: Primary | ICD-10-CM

## 2019-02-26 NOTE — TELEPHONE ENCOUNTER
Contacted patient and advised him on the abnormal imaging results. Patient was advised that an ortho referral and a MRI were ordered as well. Patient was provided detailed information about the condition and tolerated well. Patient verbalized understanding.

## 2019-02-26 NOTE — PROGRESS NOTES
Patient has graduated from the Transitions of Care Coordination  program on 12/26/18 Baldemar Benoit Patient's symptoms are stable at this time. Patient/family has the ability to self-manage. Care management goals have been completed at this time. No further nurse navigator follow up scheduled. Goals Addressed This Visit's Progress  COMPLETED: Prevent complications post hospitalization. Pt has nurse navigator's contact information for any further questions, concerns, or needs. Patients upcoming visits:  No future appointments.

## 2019-03-08 ENCOUNTER — OFFICE VISIT (OUTPATIENT)
Dept: FAMILY MEDICINE CLINIC | Age: 59
End: 2019-03-08

## 2019-03-08 ENCOUNTER — HOSPITAL ENCOUNTER (OUTPATIENT)
Dept: MRI IMAGING | Age: 59
Discharge: HOME | End: 2019-03-08
Attending: FAMILY MEDICINE
Payer: MEDICARE

## 2019-03-08 ENCOUNTER — TELEPHONE (OUTPATIENT)
Dept: FAMILY MEDICINE CLINIC | Age: 59
End: 2019-03-08

## 2019-03-08 VITALS
OXYGEN SATURATION: 97 % | RESPIRATION RATE: 17 BRPM | SYSTOLIC BLOOD PRESSURE: 154 MMHG | WEIGHT: 161 LBS | BODY MASS INDEX: 25.27 KG/M2 | HEIGHT: 67 IN | DIASTOLIC BLOOD PRESSURE: 70 MMHG | HEART RATE: 68 BPM | TEMPERATURE: 97.8 F

## 2019-03-08 DIAGNOSIS — M79.601 BILATERAL ARM PAIN: Primary | ICD-10-CM

## 2019-03-08 DIAGNOSIS — M79.602 BILATERAL ARM PAIN: Primary | ICD-10-CM

## 2019-03-08 DIAGNOSIS — M54.6 ACUTE LEFT-SIDED THORACIC BACK PAIN: ICD-10-CM

## 2019-03-08 DIAGNOSIS — M48.02 CERVICAL STENOSIS OF SPINE: ICD-10-CM

## 2019-03-08 DIAGNOSIS — M48.02 CERVICAL SPINAL STENOSIS: ICD-10-CM

## 2019-03-08 DIAGNOSIS — M79.18 MYOFASCIAL PAIN ON LEFT SIDE: ICD-10-CM

## 2019-03-08 PROCEDURE — 72141 MRI NECK SPINE W/O DYE: CPT

## 2019-03-08 RX ORDER — TRAMADOL HYDROCHLORIDE 50 MG/1
50 TABLET ORAL
Qty: 30 TAB | Refills: 0 | Status: SHIPPED | OUTPATIENT
Start: 2019-03-08 | End: 2019-03-22

## 2019-03-08 NOTE — TELEPHONE ENCOUNTER
Patient needs pre-op clearance for teeth extraction and paperwork filled out. Told patient the next available appointment isn't until 3/25/19 he stormed off saying he needs Monday and someone needs to make it happen. Please advise, thank you.

## 2019-03-08 NOTE — PROGRESS NOTES
Heber Scruggs is a 60 y.o.  male and presents with    Chief Complaint   Patient presents with    Form Completion     dental clearance, tooth extraction    Stiff Neck     pt completed MRI spine today    Arm Pain     Subjective:  Mr. Michelle Arndt returns c/o bilateral hand paresthesias which is greatest in the morning when he wakes. He reports that his hands feel cold. He reports that he has full range of motion. His proximal arms are throbbing. Osteoarthritis and Chronic Pain:  Patient has osteoarthritis, primarily affecting the neck. Symptoms onset: problem is longstanding. Rheumatological ROS: increasing significant pain in neck. Response to treatment plan: gradually worsening.      ROS   General ROS: negative for - chills or fever  Ophthalmic ROS: positive for - uses glasses  ENT ROS: negative for - headaches, nasal discharge or sore throat  Endocrine ROS: negative for - polydipsia/polyuria or temperature intolerance  Respiratory ROS: no cough, shortness of breath, or wheezing  Cardiovascular ROS: no chest pain or dyspnea on exertion  Gastrointestinal ROS: no abdominal pain, change in bowel habits, or black or bloody stools  Genito-Urinary ROS: no dysuria, trouble voiding, or hematuria  Musculoskeletal ROS: positive for - pain in arm - left  Dermatological ROS: negative for - rash or skin lesion change    All other systems reviewed and are negative.       Objective:  Vitals:    03/08/19 1434   Pulse: 68   Resp: 17   Temp: 97.8 °F (36.6 °C)   TempSrc: Oral   SpO2: 97%   Weight: 161 lb (73 kg)   Height: 5' 7\" (1.702 m)   PainSc:   8   PainLoc: Arm     alert, well appearing, and in no distress, oriented to person, place, and time and normal appearing weight  Mental status - normal mood, behavior, speech, dress, motor activity, and thought processes  Chest - clear to auscultation, no wheezes, rales or rhonchi, symmetric air entry  Heart - normal rate, regular rhythm, normal S1, S2, no murmurs, rubs, clicks or gallops  Neurological - cranial nerves II through XII intact,  normal gait and station  Musculoskeletal - abnormal exam of left arm with ttp around bicep      Assessment/Plan:    1. Acute left-sided thoracic back pain  Pain management  - traMADol (ULTRAM) 50 mg tablet; Take 1 Tab by mouth every six (6) hours as needed for Pain for up to 14 days. Max Daily Amount: 200 mg. Dispense: 30 Tab; Refill: 0    2. Myofascial pain on left side    - traMADol (ULTRAM) 50 mg tablet; Take 1 Tab by mouth every six (6) hours as needed for Pain for up to 14 days. Max Daily Amount: 200 mg. Dispense: 30 Tab; Refill: 0    3. Bilateral arm pain    - traMADol (ULTRAM) 50 mg tablet; Take 1 Tab by mouth every six (6) hours as needed for Pain for up to 14 days. Max Daily Amount: 200 mg. Dispense: 30 Tab; Refill: 0    4. Cervical stenosis of spine  Mri results pending  - traMADol (ULTRAM) 50 mg tablet; Take 1 Tab by mouth every six (6) hours as needed for Pain for up to 14 days. Max Daily Amount: 200 mg. Dispense: 30 Tab; Refill: 0      Lab review: no lab studies available for review at time of visit      I have discussed the diagnosis with the patient and the intended plan as seen in the above orders. The patient has received an after-visit summary and questions were answered concerning future plans. I have discussed medication side effects and warnings with the patient as well. I have reviewed the plan of care with the patient, accepted their input and they are in agreement with the treatment goals. Follow-up Disposition:  Return if symptoms worsen or fail to improve.

## 2019-03-08 NOTE — TELEPHONE ENCOUNTER
Contacted the patient and offered a same day appointment for today for medical clearance.  Awaiting arrival.

## 2019-03-11 ENCOUNTER — DOCUMENTATION ONLY (OUTPATIENT)
Dept: FAMILY MEDICINE CLINIC | Age: 59
End: 2019-03-11

## 2019-05-02 NOTE — PROGRESS NOTES
Chief Complaint   Patient presents with   [facility] Follow Up     1. Have you been to the ER, urgent care clinic since your last visit? Hospitalized since your last visit? Yes When: 11/19-11/20 Where: [facility] Reason for visit: TIA    2. Have you seen or consulted any other health care providers outside of the [address] [place] [address] since your last visit? Include any pap smears or colon screening.  No Kb cantrell.

## 2019-05-31 ENCOUNTER — OFFICE VISIT (OUTPATIENT)
Dept: FAMILY MEDICINE CLINIC | Age: 59
End: 2019-05-31

## 2019-05-31 VITALS
HEART RATE: 64 BPM | RESPIRATION RATE: 18 BRPM | DIASTOLIC BLOOD PRESSURE: 81 MMHG | HEIGHT: 67 IN | WEIGHT: 164.6 LBS | TEMPERATURE: 96.4 F | BODY MASS INDEX: 25.83 KG/M2 | SYSTOLIC BLOOD PRESSURE: 139 MMHG | OXYGEN SATURATION: 97 %

## 2019-05-31 DIAGNOSIS — J30.1 SEASONAL ALLERGIC RHINITIS DUE TO POLLEN: Primary | ICD-10-CM

## 2019-05-31 DIAGNOSIS — M50.30 DDD (DEGENERATIVE DISC DISEASE), CERVICAL: ICD-10-CM

## 2019-05-31 DIAGNOSIS — M54.12 CERVICAL RADICULAR PAIN: ICD-10-CM

## 2019-05-31 DIAGNOSIS — F41.9 ANXIETY: ICD-10-CM

## 2019-05-31 RX ORDER — GABAPENTIN 300 MG/1
300 CAPSULE ORAL 3 TIMES DAILY
Qty: 270 CAP | Refills: 3 | Status: SHIPPED | OUTPATIENT
Start: 2019-05-31 | End: 2019-12-11 | Stop reason: SDUPTHER

## 2019-05-31 RX ORDER — LORAZEPAM 1 MG/1
1 TABLET ORAL
Qty: 30 TAB | Refills: 2 | Status: SHIPPED | OUTPATIENT
Start: 2019-05-31 | End: 2019-12-09 | Stop reason: SDUPTHER

## 2019-05-31 RX ORDER — CETIRIZINE HCL 10 MG
10 TABLET ORAL DAILY
Qty: 30 TAB | Refills: 2 | Status: SHIPPED | OUTPATIENT
Start: 2019-05-31

## 2019-05-31 NOTE — PROGRESS NOTES
Jm Wong is a 60 y.o.  male and presents with    Chief Complaint   Patient presents with    Medication Refill     Subjective:  Mr. Shawna Park returns for f/u bilateral hand paresthesias which is greatest in the morning when he wakes. Perfecto Jason reports that his hands feel cold. He reports that he has full range of motion. His proximal arms are throbbing. Symptoms have improved after starting gabapentin which was started by ortho spine. He was advised to  Have surgery on cervical spine but he does not want surgery at this time. Additionally he has had chest congestion for the past 2 weeks. He has used mucinex without improvement. He has not used allergy medication; he has h/o allergy symptoms with pollen.       Osteoarthritis and Chronic Pain:  Patient has osteoarthritis, primarily affecting the neck. Symptoms onset: problem is longstanding. Rheumatological ROS: increasing significant pain in neck. Response to treatment plan: gradually worsening.     Anxiety Review:  He is seen for anxiety disorder, panic attacks. Current treatment includes buspar, Ativan and no other therapies. Ongoing symptoms include: palpitations, sweating, shortness of breath, dizziness, paresthesias, insomnia, racing thoughts, psychomotor agitation, feelings of losing control, difficulty concentrating. Patient denies: suicidal ideation.    Reported side effects from the treatment: GI disturbancewith his buspar.     ROS   General ROS: negative for - chills or fever  Ophthalmic ROS: positive for - uses glasses  ENT ROS: negative for - headaches, nasal discharge or sore throat  Endocrine ROS: negative for - polydipsia/polyuria or temperature intolerance  Respiratory ROS: no cough, shortness of breath, or wheezing  Cardiovascular ROS: no chest pain or dyspnea on exertion  Gastrointestinal ROS: no abdominal pain, change in bowel habits, or black or bloody stools  Genito-Urinary ROS: no dysuria, trouble voiding, or hematuria  Musculoskeletal ROS: positive for - pain in arm - left  Dermatological ROS: negative for - rash or skin lesion change     All other systems reviewed and are negative. Objective:  Vitals:    05/31/19 1535   BP: 139/81   Pulse: 64   Resp: 18   Temp: 96.4 °F (35.8 °C)   TempSrc: Oral   SpO2: 97%   Weight: 164 lb 9.6 oz (74.7 kg)   Height: 5' 7\" (1.702 m)   PainSc:   6   PainLoc: Neck       alert, well appearing, and in no distress, oriented to person, place, and time and normal appearing weight  Mental status - normal mood, behavior, speech, dress, motor activity, and thought processes  Nose - clear rhinorrhea  Mouth - mucous membranes moist, pharynx normal without lesions  Chest - clear to auscultation, no wheezes, rales or rhonchi, symmetric air entry  Heart - normal rate, regular rhythm, normal S1, S2, no murmurs, rubs, clicks or gallops  Neuro - CN II-XII intact; no motor deficit on exam    Assessment/Plan:    1. Anxiety  Continue bzd with caution  - LORazepam (ATIVAN) 1 mg tablet; Take 1 Tab by mouth every eight (8) hours as needed for Anxiety. Max Daily Amount: 3 mg. Dispense: 30 Tab; Refill: 2    2. Seasonal allergic rhinitis due to pollen  Start antihistamine  - cetirizine (ZYRTEC) 10 mg tablet; Take 1 Tab by mouth daily. Dispense: 30 Tab; Refill: 2    3. DDD (degenerative disc disease), cervical  Start gabapentin  - gabapentin (NEURONTIN) 300 mg capsule; Take 1 Cap by mouth three (3) times daily. Dispense: 270 Cap; Refill: 3    4. Cervical radicular pain  F/u with ortho spine   - gabapentin (NEURONTIN) 300 mg capsule; Take 1 Cap by mouth three (3) times daily. Dispense: 270 Cap; Refill: 3    Lab review: no lab studies available for review at time of visit      I have discussed the diagnosis with the patient and the intended plan as seen in the above orders. The patient has received an after-visit summary and questions were answered concerning future plans.   I have discussed medication side effects and warnings with the patient as well. I have reviewed the plan of care with the patient, accepted their input and they are in agreement with the treatment goals.

## 2019-05-31 NOTE — PROGRESS NOTES
Chief Complaint   Patient presents with    Medication Refill     1. Have you been to the ER, urgent care clinic since your last visit? Hospitalized since your last visit? No    2. Have you seen or consulted any other health care providers outside of the [address] [place] [address] since your last visit? Include any pap smears or colon screening.  No

## 2019-06-26 ENCOUNTER — HOSPITAL ENCOUNTER (EMERGENCY)
Age: 59
Discharge: HOME | End: 2019-06-26
Attending: EMERGENCY MEDICINE | Admitting: EMERGENCY MEDICINE
Payer: MEDICARE

## 2019-06-26 ENCOUNTER — APPOINTMENT (OUTPATIENT)
Dept: GENERAL RADIOLOGY | Age: 59
End: 2019-06-26
Attending: EMERGENCY MEDICINE
Payer: MEDICARE

## 2019-06-26 VITALS
TEMPERATURE: 97.9 F | OXYGEN SATURATION: 97 % | SYSTOLIC BLOOD PRESSURE: 154 MMHG | HEIGHT: 67 IN | RESPIRATION RATE: 16 BRPM | HEART RATE: 70 BPM | DIASTOLIC BLOOD PRESSURE: 80 MMHG | WEIGHT: 161 LBS | BODY MASS INDEX: 25.27 KG/M2

## 2019-06-26 DIAGNOSIS — L03.114 CELLULITIS OF LEFT ELBOW: Primary | ICD-10-CM

## 2019-06-26 DIAGNOSIS — S51.032A PUNCTURE WOUND OF LEFT ELBOW, INITIAL ENCOUNTER: ICD-10-CM

## 2019-06-26 PROCEDURE — 90715 TDAP VACCINE 7 YRS/> IM: CPT | Performed by: EMERGENCY MEDICINE

## 2019-06-26 PROCEDURE — 99283 EMERGENCY DEPT VISIT LOW MDM: CPT

## 2019-06-26 PROCEDURE — 90471 IMMUNIZATION ADMIN: CPT

## 2019-06-26 PROCEDURE — 74011250636 HC RX REV CODE- 250/636: Performed by: EMERGENCY MEDICINE

## 2019-06-26 PROCEDURE — 74011250637 HC RX REV CODE- 250/637: Performed by: EMERGENCY MEDICINE

## 2019-06-26 PROCEDURE — 73080 X-RAY EXAM OF ELBOW: CPT

## 2019-06-26 RX ORDER — DEXAMETHASONE 4 MG/1
8 TABLET ORAL
Status: COMPLETED | OUTPATIENT
Start: 2019-06-26 | End: 2019-06-26

## 2019-06-26 RX ORDER — ACETAMINOPHEN 500 MG
1000 TABLET ORAL
Qty: 40 TAB | Refills: 0 | Status: SHIPPED | OUTPATIENT
Start: 2019-06-26

## 2019-06-26 RX ORDER — CEPHALEXIN 500 MG/1
1000 CAPSULE ORAL 3 TIMES DAILY
Qty: 42 CAP | Refills: 0 | Status: SHIPPED | OUTPATIENT
Start: 2019-06-26 | End: 2019-07-03

## 2019-06-26 RX ORDER — CEPHALEXIN 250 MG/1
1000 CAPSULE ORAL
Status: COMPLETED | OUTPATIENT
Start: 2019-06-26 | End: 2019-06-26

## 2019-06-26 RX ADMIN — CEPHALEXIN 1000 MG: 250 CAPSULE ORAL at 22:00

## 2019-06-26 RX ADMIN — DEXAMETHASONE 8 MG: 4 TABLET ORAL at 22:00

## 2019-06-26 RX ADMIN — TETANUS TOXOID, REDUCED DIPHTHERIA TOXOID AND ACELLULAR PERTUSSIS VACCINE, ADSORBED 0.5 ML: 5; 2.5; 8; 8; 2.5 SUSPENSION INTRAMUSCULAR at 22:00

## 2019-06-27 NOTE — ED PROVIDER NOTES
Glenny Carrero is a 60 y.o. Male with no history of diabetes or other immunocompromise disease with complaints of injury to left elbow this past Sunday where he was poked with a metal object and then has since developed slight swelling and redness to his arm. Patient denies any fevers chills, sweats. There is no drainage. He is able to move his elbow without significant difficulty. He is unclear of his tetanus status. The history is provided by the patient.         Past Medical History:   Diagnosis Date    Anxiety     Arthritis     osteo-hips    Asthma     as a child only    Bilateral primary osteoarthritis of hip 7/14/2016    Broken rib april 2013    3 broken ribs s/p fall with hemothorax    Chronic pain     hips, legs and knees    Hypertension     Psychiatric disorder     anxiety       Past Surgical History:   Procedure Laterality Date    HX HERNIA REPAIR Left     HX LUMBAR DISKECTOMY      HX MOHS PROCEDURES      (R) rotator cuff         Family History:   Problem Relation Age of Onset    Dementia Mother     Cancer Father 61       Social History     Socioeconomic History    Marital status: SINGLE     Spouse name: Not on file    Number of children: Not on file    Years of education: Not on file    Highest education level: Not on file   Occupational History    Not on file   Social Needs    Financial resource strain: Not on file    Food insecurity:     Worry: Not on file     Inability: Not on file    Transportation needs:     Medical: Not on file     Non-medical: Not on file   Tobacco Use    Smoking status: Current Every Day Smoker     Packs/day: 0.25     Years: 30.00     Pack years: 7.50    Smokeless tobacco: Never Used    Tobacco comment: pt will try to quit smoking   Substance and Sexual Activity    Alcohol use: No     Alcohol/week: 0.0 oz     Comment: quit 1/2016    Drug use: No    Sexual activity: Yes     Partners: Female   Lifestyle    Physical activity:     Days per week: Not on file     Minutes per session: Not on file    Stress: Not on file   Relationships    Social connections:     Talks on phone: Not on file     Gets together: Not on file     Attends Tenriism service: Not on file     Active member of club or organization: Not on file     Attends meetings of clubs or organizations: Not on file     Relationship status: Not on file    Intimate partner violence:     Fear of current or ex partner: Not on file     Emotionally abused: Not on file     Physically abused: Not on file     Forced sexual activity: Not on file   Other Topics Concern    Not on file   Social History Narrative    Not on file         ALLERGIES: Patient has no known allergies. Review of Systems   Constitutional: Negative for chills, diaphoresis and fever. HENT: Negative for sore throat. Eyes: Negative for visual disturbance. Respiratory: Negative for shortness of breath. Cardiovascular: Negative for chest pain. Gastrointestinal: Negative for abdominal pain. Musculoskeletal: Positive for arthralgias and joint swelling. Negative for gait problem. Skin: Positive for color change and rash. Allergic/Immunologic: Negative for immunocompromised state. Neurological: Negative for numbness. Psychiatric/Behavioral: Negative for sleep disturbance. Vitals:    06/26/19 2131   BP: 154/80   Pulse: 70   Resp: 16   Temp: 97.9 °F (36.6 °C)   SpO2: 97%   Weight: 73 kg (161 lb)   Height: 5' 7\" (1.702 m)            Physical Exam   Constitutional: He is oriented to person, place, and time. He appears well-developed and well-nourished. Non-toxic appearance. He does not have a sickly appearance. He does not appear ill. No distress. HENT:   Head: Normocephalic and atraumatic. Right Ear: External ear normal.   Left Ear: External ear normal.   Nose: Nose normal.   Mouth/Throat: Oropharynx is clear and moist. No oropharyngeal exudate.    Eyes: Conjunctivae are normal.   Neck: Normal range of motion. Cardiovascular: Normal rate, regular rhythm, normal heart sounds and intact distal pulses. Pulmonary/Chest: Effort normal and breath sounds normal. No respiratory distress. Abdominal: Soft. There is no tenderness. Musculoskeletal: Normal range of motion. He exhibits no edema. Arms:  There is erythema extending into the forearm with no decreased mobility of distal joints. There is normal distal sensation and pulse   Neurological: He is alert and oriented to person, place, and time. Skin: Skin is warm and dry. He is not diaphoretic. Psychiatric: His behavior is normal.   Nursing note and vitals reviewed. MDM       Procedures    Vitals:  Patient Vitals for the past 12 hrs:   Temp Pulse Resp BP SpO2   06/26/19 2131 97.9 °F (36.6 °C) 70 16 154/80 97 %         Medications ordered:   Medications   cephALEXin (KEFLEX) capsule 1,000 mg (1,000 mg Oral Given 6/26/19 2200)   dexamethasone (DECADRON) tablet 8 mg (8 mg Oral Given 6/26/19 2200)   diph,Pertuss(AC),Tet Vac-PF (BOOSTRIX) suspension 0.5 mL (0.5 mL IntraMUSCular Given 6/26/19 2200)         Lab findings:  No results found for this or any previous visit (from the past 12 hour(s)). EKG interpretation by ED Physician:      X-Ray, CT or other radiology findings or impressions:  XR ELBOW LT MIN 3 V    (Results Pending)   No fracture or obvious foreign body. Progress notes, Consult notes or additional Procedure notes:   Do not feel patient has septic joint. I will place him on oral antibiotics with recommended follow-up    I have discussed with patient and/or family/sig other the results, interpretation of any imaging if performed, suspected diagnosis and treatment plan to include instructions regarding the diagnoses listed to which understanding was expressed with all questions answered      Reevaluation of patient:   stable    Disposition:  Diagnosis:   1. Cellulitis of left elbow    2.  Puncture wound of left elbow, initial encounter Disposition: home    Follow-up Information     Follow up With Specialties Details Why Contact Info    Edward Friend MD Orthopedic Surgery Schedule an appointment as soon as possible for a visit for recheck of your elbow [facility]  [address] [place] [address] EMERGENCY DEPT Emergency Medicine  If symptoms worsen [address]  [phone]            Patient's Medications   Start Taking    ACETAMINOPHEN (TYLENOL EXTRA STRENGTH) 500 MG TABLET    Take 2 Tabs by mouth every six (6) hours as needed for Pain. CEPHALEXIN (KEFLEX) 500 MG CAPSULE    Take 2 Caps by mouth three (3) times daily for 7 days. Continue Taking    ALBUTEROL (PROVENTIL HFA, VENTOLIN HFA, PROAIR HFA) 90 MCG/ACTUATION INHALER    Take 2 Puffs by inhalation every six (6) hours as needed for Wheezing or Shortness of Breath. AMLODIPINE (NORVASC) 5 MG TABLET    TAKE ONE TABLET BY MOUTH ONCE DAILY    ASPIRIN (ASPIRIN) 325 MG TABLET    Take 1 Tab by mouth daily. ATORVASTATIN (LIPITOR) 80 MG TABLET    Take 1 Tab by mouth nightly. BUPROPION SR (WELLBUTRIN SR) 150 MG SR TABLET    Take 1 Tab by mouth two (2) times a day. CETIRIZINE (ZYRTEC) 10 MG TABLET    Take 1 Tab by mouth daily. CITALOPRAM (CELEXA) 10 MG TABLET    Take 1 Tab by mouth every evening. FOLIC ACID (FOLVITE) 1 MG TABLET    Take 1 Tab by mouth daily. GABAPENTIN (NEURONTIN) 300 MG CAPSULE    Take 1 Cap by mouth three (3) times daily. IMIQUIMOD (ALDARA) 5 % CREAM    Apply 0.5 Packets to affected area daily. apply a thin layer as directed    LIDOCAINE (LIDODERM) 5 %    Apply patch to the affected area for 12 hours a day and remove for 12 hours a day. LORAZEPAM (ATIVAN) 1 MG TABLET    Take 1 Tab by mouth every eight (8) hours as needed for Anxiety. Max Daily Amount: 3 mg. SILDENAFIL CITRATE (VIAGRA) 100 MG TABLET    Take 1 Tab by mouth as needed.     THIAMINE MONONITRATE (B-1) 100 MG TABLET Take 1 Tab by mouth daily. TIZANIDINE (ZANAFLEX) 4 MG TABLET    Take 1 Tab by mouth every six (6) hours as needed.    These Medications have changed    No medications on file   Stop Taking    No medications on file

## 2019-06-27 NOTE — ED TRIAGE NOTES
Wresting with son on Sunday and hit elbow on fireplace poker. Left elbow red and swollen with redness extending to upper arm and down forearm.

## 2019-06-27 NOTE — ED NOTES
Asumed care of patient for discharge. I have reviewed discharge instructions with the patient and/or family member. The patient verbalized understanding. Patient armband removed and shredded. VSS. Patient verbalized understanding of how to properly take any prescribed medications.

## 2019-06-27 NOTE — DISCHARGE INSTRUCTIONS
Patient Education        Puncture Wounds: Care Instructions  Your Care Instructions    A puncture wound can happen anywhere on your body. These wounds tend to be narrower and deeper than cuts. A puncture wound is usually left open instead of being closed. This is because a puncture wound can be easily infected, and closing it can make infection even more likely. You will probably have a bandage over the wound. The doctor has checked you carefully, but problems can develop later. If you notice any problems or new symptoms, get medical treatment right away. Follow-up care is a key part of your treatment and safety. Be sure to make and go to all appointments, and call your doctor if you are having problems. It's also a good idea to know your test results and keep a list of the medicines you take. How can you care for yourself at home? · Keep the wound dry for the first 24 to 48 hours. After this, you can shower if your doctor okays it. Pat the wound dry. · Don't soak the wound, such as in a bathtub. Your doctor will tell you when it's safe to get the wound wet. · If your doctor told you how to care for your wound, follow your doctor's instructions. If you did not get instructions, follow this general advice:  ? After the first 24 to 48 hours, wash the wound with clean water 2 times a day. Don't use hydrogen peroxide or alcohol, which can slow healing. ? You may cover the wound with a thin layer of petroleum jelly, such as Vaseline, and a nonstick bandage. ? Apply more petroleum jelly and replace the bandage as needed. · Prop up the sore area on pillows anytime you sit or lie down during the next 3 days. Try to keep it above the level of your heart. This helps reduce swelling. · Avoid any activity that could cause your wound to get worse. · Be safe with medicines. Read and follow all instructions on the label. ? If the doctor gave you a prescription medicine for pain, take it as prescribed.   ? If you are not taking a prescription pain medicine, ask your doctor if you can take an over-the-counter medicine. · If your doctor prescribed antibiotics, take them as directed. Do not stop taking them just because you feel better. You need to take the full course of antibiotics. When should you call for help? Call your doctor now or seek immediate medical care if:    · You have new pain, or your pain gets worse.     · The wound starts to bleed, and blood soaks through the bandage. Oozing small amounts of blood is normal.     · The skin near the wound is cold or pale or changes color.     · You have tingling, weakness, or numbness near the wound.     · You have trouble moving the area near the wound.     · You have symptoms of infection, such as:  ? Increased pain, swelling, warmth, or redness around the wound. ? Red streaks leading from the wound. ? Pus draining from the wound. ? A fever.    Watch closely for changes in your health, and be sure to contact your doctor if:    · The wound is not closing (getting smaller).     · You do not get better as expected. Where can you learn more? Go to http://carolina-misbah.info/. Enter B882 in the search box to learn more about \"Puncture Wounds: Care Instructions. \"  Current as of: September 23, 2018  Content Version: 11.9  © 3075-9921 [employer]. Care instructions adapted under license by [employer] (which disclaims liability or warranty for this information). If you have questions about a medical condition or this instruction, always ask your healthcare professional. [place] [address] any warranty or liability for your use of this information. Patient Education        Cellulitis: Care Instructions  Your Care Instructions    Cellulitis is a skin infection caused by bacteria, most often strep or staph. It often occurs after a break in the skin from a scrape, cut, bite, or puncture, or after a rash.   Cellulitis may be treated without doing tests to find out what caused it. But your doctor may do tests, if needed, to look for a specific bacteria, like methicillin-resistant Staphylococcus aureus (MRSA). The doctor has checked you carefully, but problems can develop later. If you notice any problems or new symptoms, get medical treatment right away. Follow-up care is a key part of your treatment and safety. Be sure to make and go to all appointments, and call your doctor if you are having problems. It's also a good idea to know your test results and keep a list of the medicines you take. How can you care for yourself at home? · Take your antibiotics as directed. Do not stop taking them just because you feel better. You need to take the full course of antibiotics. · Prop up the infected area on pillows to reduce pain and swelling. Try to keep the area above the level of your heart as often as you can. · If your doctor told you how to care for your wound, follow your doctor's instructions. If you did not get instructions, follow this general advice:  ? Wash the wound with clean water 2 times a day. Don't use hydrogen peroxide or alcohol, which can slow healing. ? You may cover the wound with a thin layer of petroleum jelly, such as Vaseline, and a nonstick bandage. ? Apply more petroleum jelly and replace the bandage as needed. · Be safe with medicines. Take pain medicines exactly as directed. ? If the doctor gave you a prescription medicine for pain, take it as prescribed. ? If you are not taking a prescription pain medicine, ask your doctor if you can take an over-the-counter medicine. To prevent cellulitis in the future  · Try to prevent cuts, scrapes, or other injuries to your skin. Cellulitis most often occurs where there is a break in the skin. · If you get a scrape, cut, mild burn, or bite, wash the wound with clean water as soon as you can to help avoid infection.  Don't use hydrogen peroxide or alcohol, which can slow healing. · If you have swelling in your legs (edema), support stockings and good skin care may help prevent leg sores and cellulitis. · Take care of your feet, especially if you have diabetes or other conditions that increase the risk of infection. Wear shoes and socks. Do not go barefoot. If you have athlete's foot or other skin problems on your feet, talk to your doctor about how to treat them. When should you call for help? Call your doctor now or seek immediate medical care if:    · You have signs that your infection is getting worse, such as:  ? Increased pain, swelling, warmth, or redness. ? Red streaks leading from the area. ? Pus draining from the area. ? A fever.     · You get a rash.    Watch closely for changes in your health, and be sure to contact your doctor if:    · You do not get better as expected. Where can you learn more? Go to http://carolina-misbah.info/. Jeff Garcia in the search box to learn more about \"Cellulitis: Care Instructions. \"  Current as of: April 17, 2018  Content Version: 11.9  © 0635-8570 [employer]. Care instructions adapted under license by [employer] (which disclaims liability or warranty for this information). If you have questions about a medical condition or this instruction, always ask your healthcare professional. [address] any warranty or liability for your use of this information.

## 2019-07-05 ENCOUNTER — HOSPITAL ENCOUNTER (EMERGENCY)
Age: 59
Discharge: HOME | End: 2019-07-05
Attending: EMERGENCY MEDICINE
Payer: MEDICARE

## 2019-07-05 VITALS
DIASTOLIC BLOOD PRESSURE: 78 MMHG | HEART RATE: 65 BPM | SYSTOLIC BLOOD PRESSURE: 184 MMHG | RESPIRATION RATE: 18 BRPM | BODY MASS INDEX: 25.43 KG/M2 | WEIGHT: 162 LBS | TEMPERATURE: 97 F | OXYGEN SATURATION: 100 % | HEIGHT: 67 IN

## 2019-07-05 DIAGNOSIS — M70.22 OLECRANON BURSITIS OF LEFT ELBOW: Primary | ICD-10-CM

## 2019-07-05 PROCEDURE — 99282 EMERGENCY DEPT VISIT SF MDM: CPT

## 2019-07-05 NOTE — DISCHARGE INSTRUCTIONS
Patient Education        Bursitis of the Elbow: Care Instructions  Your Care Instructions  Bursitis is pain and swelling of the bursae. These are sacs of fluid that help your joints move smoothly. Olecranon bursitis is a type of bursitis that affects the back of the elbow. This is sometimes called Issac elbow because the bump that develops looks like the cartoon character Issac's elbow. Injury, overuse, or prolonged pressure on your elbow can cause this form of bursitis. Sometimes it happens when people have arthritis. It also can occur for unknown reasons. Treatment may include draining fluid from the bursa with a needle. If your doctor thought there was infection, he or she may have prescribed antibiotics. You also may get shots of medicine into the bursa to help the swelling go down. Your elbow should get better in a few days or weeks. Follow-up care is a key part of your treatment and safety. Be sure to make and go to all appointments, and call your doctor if you are having problems. It's also a good idea to know your test results and keep a list of the medicines you take. How can you care for yourself at home? · Take pain medicines exactly as directed. ? If the doctor gave you a prescription medicine for pain, take it as prescribed. ? If you are not taking a prescription pain medicine, ask your doctor if you can take an over-the-counter medicine. ? Do not take two or more pain medicines at the same time unless the doctor told you to. Many pain medicines have acetaminophen, which is Tylenol. Too much acetaminophen (Tylenol) can be harmful. · If your doctor prescribed antibiotics, take them as directed. Do not stop taking them just because you feel better. You need to take the full course of antibiotics. · If your doctor gave you a sling, an elastic bandage, or a compression sleeve, wear it exactly as instructed. · Put ice or a cold pack on your elbow for 10 to 20 minutes at a time.  Try to do this every 1 to 2 hours for the next 3 days (when you are awake) or until the swelling goes down. Put a thin cloth between the ice and your skin. · After 3 days, you can try heat, or alternate heat and ice. · Rest your elbow. Try to stop or reduce any activity that causes pain. · Wear elbow pads during physical activity to prevent injury. · Do not lean your elbows on tables or armrests. When should you call for help? Call your doctor now or seek immediate medical care if:    · You have new or worse symptoms of infection, such as:  ? Increased pain, swelling, warmth, or redness. ? Red streaks leading from the area. ? Pus draining from the area. ? A fever.    Watch closely for changes in your health, and be sure to contact your doctor if:    · You do not get better as expected. Where can you learn more? Go to http://carolina-misbah.info/. Enter  in the search box to learn more about \"Bursitis of the Elbow: Care Instructions. \"  Current as of: September 20, 2018  Content Version: 11.9  © 4047-9574 [employer]. Care instructions adapted under license by [employer] (which disclaims liability or warranty for this information). If you have questions about a medical condition or this instruction, always ask your healthcare professional. [address] any warranty or liability for your use of this information.

## 2019-07-05 NOTE — ED NOTES
Ace wrap applied. If you experience chest pain call 911. Do not drive yourself. I have reviewed discharge instructions with the patient. The patient verbalized understanding.

## 2019-07-06 NOTE — ED PROVIDER NOTES
EMERGENCY DEPARTMENT HISTORY AND PHYSICAL EXAM    8:04 PM      Date: 7/5/2019  Patient Name: Blanca Gunn    History of Presenting Illness     Chief Complaint   Patient presents with    Elbow Pain         History Provided By: Patient    Chief Complaint: left elbow pain       Additional History (Context): Blanca Gunn is a 60 y.o. male with No significant past medical history who presents with left elbow pain. He states he was seen for this 1 week ago, diagnosed with cellulitis of left elbow and discharged on abx. He has not followed up with ortho. He has completed the antibiotics. He says the redness and pain improved, but the swelling is still present. Pain is minimal at this time, and he does not require medication for the pain. Denies fevers. No drainage from the joint. He has never had this before. PCP: Radha Alves MD    Current Outpatient Medications   Medication Sig Dispense Refill    acetaminophen (TYLENOL EXTRA STRENGTH) 500 mg tablet Take 2 Tabs by mouth every six (6) hours as needed for Pain. 40 Tab 0    gabapentin (NEURONTIN) 300 mg capsule Take 1 Cap by mouth three (3) times daily. 270 Cap 3    LORazepam (ATIVAN) 1 mg tablet Take 1 Tab by mouth every eight (8) hours as needed for Anxiety. Max Daily Amount: 3 mg. 30 Tab 2    cetirizine (ZYRTEC) 10 mg tablet Take 1 Tab by mouth daily. 30 Tab 2    imiquimod (ALDARA) 5 % cream Apply 0.5 Packets to affected area daily. apply a thin layer as directed 24 Packet 6    folic acid (FOLVITE) 1 mg tablet Take 1 Tab by mouth daily. 90 Tab 3    thiamine mononitrate (B-1) 100 mg tablet Take 1 Tab by mouth daily. 90 Tab 3    atorvastatin (LIPITOR) 80 mg tablet Take 1 Tab by mouth nightly. 90 Tab 3    lidocaine (LIDODERM) 5 % Apply patch to the affected area for 12 hours a day and remove for 12 hours a day. 30 Each 0    tiZANidine (ZANAFLEX) 4 mg tablet Take 1 Tab by mouth every six (6) hours as needed.  30 Tab 0    citalopram (CELEXA) 10 mg tablet Take 1 Tab by mouth every evening. 90 Tab 3    buPROPion SR (WELLBUTRIN SR) 150 mg SR tablet Take 1 Tab by mouth two (2) times a day. 60 Tab 0    aspirin (ASPIRIN) 325 mg tablet Take 1 Tab by mouth daily. 30 Tab 1    amLODIPine (NORVASC) 5 mg tablet TAKE ONE TABLET BY MOUTH ONCE DAILY 30 Tab 11    albuterol (PROVENTIL HFA, VENTOLIN HFA, PROAIR HFA) 90 mcg/actuation inhaler Take 2 Puffs by inhalation every six (6) hours as needed for Wheezing or Shortness of Breath. 1 Inhaler 12    sildenafil citrate (VIAGRA) 100 mg tablet Take 1 Tab by mouth as needed. 12 Tab 1       Past History     Past Medical History:  Past Medical History:   Diagnosis Date    Anxiety     Arthritis     osteo-hips    Asthma     as a child only    Bilateral primary osteoarthritis of hip 7/14/2016    Broken rib april 2013    3 broken ribs s/p fall with hemothorax    Chronic pain     hips, legs and knees    Hypertension     Psychiatric disorder     anxiety       Past Surgical History:  Past Surgical History:   Procedure Laterality Date    HX HERNIA REPAIR Left     HX LUMBAR DISKECTOMY      HX MOHS PROCEDURES      (R) rotator cuff       Family History:  Family History   Problem Relation Age of Onset    Dementia Mother     Cancer Father 61       Social History:  Social History     Tobacco Use    Smoking status: Current Every Day Smoker     Packs/day: 0.25     Years: 30.00     Pack years: 7.50    Smokeless tobacco: Never Used    Tobacco comment: pt will try to quit smoking   Substance Use Topics    Alcohol use: No     Alcohol/week: 0.0 oz     Comment: quit 1/2016    Drug use: No       Allergies:  No Known Allergies      Review of Systems       Review of Systems   Constitutional: Negative for fever. HENT: Negative for facial swelling. Eyes: Negative for visual disturbance. Respiratory: Negative for shortness of breath. Cardiovascular: Negative for chest pain.    Gastrointestinal: Negative for abdominal pain. Genitourinary: Negative for dysuria. Musculoskeletal: Positive for arthralgias and joint swelling. Negative for neck pain. Skin: Negative for rash. Neurological: Negative for dizziness. Psychiatric/Behavioral: Negative for confusion. All other systems reviewed and are negative. Physical Exam     Visit Vitals  /78 (BP 1 Location: Right arm, BP Patient Position: Sitting)   Pulse 65   Temp 97 °F (36.1 °C)   Resp 18   Ht 5' 7\" (1.702 m)   Wt 73.5 kg (162 lb)   SpO2 100%   BMI 25.37 kg/m²         Physical Exam   Constitutional: He appears well-developed and well-nourished. No distress. HENT:   Head: Normocephalic and atraumatic. Eyes: Conjunctivae are normal.   Neck: Normal range of motion. Neck supple. Cardiovascular: Normal rate. Pulmonary/Chest: Effort normal.   Abdominal: Soft. Musculoskeletal: Normal range of motion. Localized swelling over left olecranon. Full ROM. Minimal tenderness,     Neurological: He is alert. Skin: Skin is warm and dry. He is not diaphoretic. No warmth or erythema    Psychiatric: He has a normal mood and affect. Nursing note and vitals reviewed. Diagnostic Study Results     Labs -  No results found for this or any previous visit (from the past 12 hour(s)). Radiologic Studies -   No orders to display         Medical Decision Making   I am the first provider for this patient. I reviewed the vital signs, available nursing notes, past medical history, past surgical history, family history and social history. Vital Signs-Reviewed the patient's vital signs. Records Reviewed: Nursing Notes (Time of Review: 8:04 PM)    ED Course: Progress Notes, Reevaluation, and Consults:      Provider Notes (Medical Decision Making): MDM  Number of Diagnoses or Management Options  Olecranon bursitis of left elbow:   Diagnosis management comments: Referred to ortho for aspiration. No signs of infection today. Pain controlled. Diagnosis     Clinical Impression:   1. Olecranon bursitis of left elbow        Disposition: Discharged      Follow-up Information     Follow up With Specialties Details Why Contact Info    Vaishali Roy MD Orthopedic Surgery Schedule an appointment as soon as possible for a visit  [facility]   [address] [address] [phone]      [facility] EMERGENCY DEPT Emergency Medicine  Immediately if symptoms worsen [address]  [phone]           Discharge Medication List as of 7/5/2019  5:19 PM      CONTINUE these medications which have NOT CHANGED    Details   acetaminophen (TYLENOL EXTRA STRENGTH) 500 mg tablet Take 2 Tabs by mouth every six (6) hours as needed for Pain., Print, Disp-40 Tab, R-0      gabapentin (NEURONTIN) 300 mg capsule Take 1 Cap by mouth three (3) times daily. , Normal, Disp-270 Cap, R-3      LORazepam (ATIVAN) 1 mg tablet Take 1 Tab by mouth every eight (8) hours as needed for Anxiety. Max Daily Amount: 3 mg., Print, Disp-30 Tab, R-2      folic acid (FOLVITE) 1 mg tablet Take 1 Tab by mouth daily. , Normal, Disp-90 Tab, R-3      thiamine mononitrate (B-1) 100 mg tablet Take 1 Tab by mouth daily. , Normal, Disp-90 Tab, R-3      aspirin (ASPIRIN) 325 mg tablet Take 1 Tab by mouth daily. , Print, Disp-30 Tab, R-1      amLODIPine (NORVASC) 5 mg tablet TAKE ONE TABLET BY MOUTH ONCE DAILY, Normal, Disp-30 Tab, R-11      cetirizine (ZYRTEC) 10 mg tablet Take 1 Tab by mouth daily. , Normal, Disp-30 Tab, R-2      imiquimod (ALDARA) 5 % cream Apply 0.5 Packets to affected area daily.  apply a thin layer as directed, Normal, Disp-24 Packet, R-6      atorvastatin (LIPITOR) 80 mg tablet Take 1 Tab by mouth nightly., Normal, Disp-90 Tab, R-3      lidocaine (LIDODERM) 5 % Apply patch to the affected area for 12 hours a day and remove for 12 hours a day., Normal, Disp-30 Each, R-0      tiZANidine (ZANAFLEX) 4 mg tablet Take 1 Tab by mouth every six (6) hours as needed., Normal, Disp-30 Tab, R-0      citalopram (CELEXA) 10 mg tablet Take 1 Tab by mouth every evening., Normal, Disp-90 Tab, R-3      buPROPion SR (WELLBUTRIN SR) 150 mg SR tablet Take 1 Tab by mouth two (2) times a day., Normal, Disp-60 Tab, R-0      albuterol (PROVENTIL HFA, VENTOLIN HFA, PROAIR HFA) 90 mcg/actuation inhaler Take 2 Puffs by inhalation every six (6) hours as needed for Wheezing or Shortness of Breath., Normal, Disp-1 Inhaler, R-12      sildenafil citrate (VIAGRA) 100 mg tablet Take 1 Tab by mouth as needed. , Print, Disp-12 Tab, R-1           _______________________________    Attestations:  Scribe Attestation     Marvin ARELIS Wells PA-C acting as a scribe for and in the presence of Toi Gonzalez PA-C      July 05, 2019 at 8:08 PM       Provider Attestation:      I personally performed the services described in the documentation, reviewed the documentation, as recorded by the scribe in my presence, and it accurately and completely records my words and actions.  July 05, 2019 at 8:08 PM - Toi Gonzalez PA-C  _______________________________

## 2019-08-06 ENCOUNTER — TELEPHONE (OUTPATIENT)
Dept: FAMILY MEDICINE CLINIC | Age: 59
End: 2019-08-06

## 2019-08-06 NOTE — TELEPHONE ENCOUNTER
8/13/2019 3:15 PM Appt scheduled with Dr. Nancy Tobias. Pt advised to continue Neurontin and do range of motion exercise. Pt stated he think's it comes from past shoulder problems. This encounter will be closed.

## 2019-08-06 NOTE — TELEPHONE ENCOUNTER
Pt. Stated he is having left shoulder pain and his left hand is going numb. He wants to know what he should do. . Please advise.

## 2019-08-13 ENCOUNTER — TELEPHONE (OUTPATIENT)
Dept: FAMILY MEDICINE CLINIC | Age: 59
End: 2019-08-13

## 2019-08-13 NOTE — TELEPHONE ENCOUNTER
Patient had an appointment scheduled 8/13/19 at 3:15, patients truck broke down. He is currently on the side of the road right now, requesting an appointment asap. Offered patient next avaliaible with Dr. Jose G Ambriz and then the next with any provider and patient still declined. Please advise, thank you.

## 2019-08-22 ENCOUNTER — OFFICE VISIT (OUTPATIENT)
Dept: FAMILY MEDICINE CLINIC | Age: 59
End: 2019-08-22

## 2019-08-22 VITALS
OXYGEN SATURATION: 96 % | WEIGHT: 166.6 LBS | TEMPERATURE: 97 F | BODY MASS INDEX: 26.15 KG/M2 | RESPIRATION RATE: 16 BRPM | DIASTOLIC BLOOD PRESSURE: 87 MMHG | HEART RATE: 60 BPM | HEIGHT: 67 IN | SYSTOLIC BLOOD PRESSURE: 140 MMHG

## 2019-08-22 DIAGNOSIS — L30.1 DYSHIDROTIC ECZEMA: ICD-10-CM

## 2019-08-22 DIAGNOSIS — M79.18 MYOFASCIAL PAIN ON LEFT SIDE: ICD-10-CM

## 2019-08-22 DIAGNOSIS — Z23 NEED FOR IMMUNIZATION AGAINST INFLUENZA: Primary | ICD-10-CM

## 2019-08-22 DIAGNOSIS — Z71.89 ADVANCE CARE PLANNING: ICD-10-CM

## 2019-08-22 DIAGNOSIS — Z00.00 MEDICARE ANNUAL WELLNESS VISIT, SUBSEQUENT: ICD-10-CM

## 2019-08-22 DIAGNOSIS — M50.30 DDD (DEGENERATIVE DISC DISEASE), CERVICAL: ICD-10-CM

## 2019-08-22 DIAGNOSIS — M54.12 CERVICAL RADICULAR PAIN: ICD-10-CM

## 2019-08-22 RX ORDER — TRIAMCINOLONE ACETONIDE 40 MG/ML
40 INJECTION, SUSPENSION INTRA-ARTICULAR; INTRAMUSCULAR ONCE
Qty: 1 ML | Refills: 0
Start: 2019-08-22 | End: 2019-08-22

## 2019-08-22 RX ORDER — METHOCARBAMOL 750 MG/1
750 TABLET, FILM COATED ORAL 4 TIMES DAILY
Qty: 120 TAB | Refills: 1 | Status: SHIPPED | OUTPATIENT
Start: 2019-08-22 | End: 2019-11-13 | Stop reason: SDUPTHER

## 2019-08-22 RX ORDER — CLOBETASOL PROPIONATE 0.5 MG/G
OINTMENT TOPICAL 2 TIMES DAILY
Qty: 60 G | Refills: 0 | Status: SHIPPED | OUTPATIENT
Start: 2019-08-22 | End: 2019-11-13 | Stop reason: SDUPTHER

## 2019-08-22 NOTE — PROGRESS NOTES
Chief Complaint   Patient presents with    Medication Refill    Neck Pain     1. Have you been to the ER, urgent care clinic since your last visit? Hospitalized since your last visit? Yes 6/26/19 and 7/5/19 [facility]- Elbow pain       2. Have you seen or consulted any other health care providers outside of the [address] [place] [address] since your last visit? Include any pap smears or colon screening.  No

## 2019-08-22 NOTE — ACP (ADVANCE CARE PLANNING)
Advance Care Planning (ACP) Provider Note - Comprehensive      Date of ACP Conversation: 08/22/2019  Persons included in Conversation:  patient  Length of ACP Conversation in minutes:  16 minutes     Authorized Decision Maker (if patient is incapable of making informed decisions): This person is:  his sister                                                       General ACP for ALL Patients with Decision Making Capacity:   Importance of advance care planning, including choosing a healthcare agent to communicate patient's healthcare decisions if patient lost the ability to make decisions, such as after a sudden illness or accident  Understanding of the healthcare agent role was assessed and information provided  Exploration of values, goals, and preferences if recovery is not expected, even with continued medical treatment in the event of: Imminent death  Severe, permanent brain injury  \"In these circumstances, what matters most to you? \"  Care focused more on comfort or quality of life. \"What, if any, treatments would you want to avoid? \" none  Opportunity offered to explore how cultural, Sabianist, spiritual, or personal beliefs would affect decisions for future care      Review of Existing Advance Directive:  What information were you given about medical decisions to consider before completing your advance directive? planning form and scenarios     For Serious or Chronic Illness:  Understanding of medical condition       Interventions Provided:  Reviewed existing Advance Directive

## 2019-08-22 NOTE — PROGRESS NOTES
(AWV) The Medicare Annual Wellness Exam PROGRESS NOTE    This is a Medicare Annual Wellness Exam (AWV)     I have reviewed the patient's medical history in detail and updated the computerized patient record. Leroy Avery is a 60 y.o.  male and presents for an annual wellness exam       ROS   General ROS: negative for - chills or fever  Ophthalmic ROS: positive for - uses glasses  ENT ROS: negative for - headaches, nasal discharge or sore throat  Endocrine ROS: negative for - polydipsia/polyuria or temperature intolerance  Respiratory ROS: no cough, shortness of breath, or wheezing  Cardiovascular ROS: no chest pain or dyspnea on exertion  Gastrointestinal ROS: no abdominal pain, change in bowel habits, or black or bloody stools  Genito-Urinary ROS: no dysuria, trouble voiding, or hematuria  Musculoskeletal ROS: positive for - pain in arm - left  Dermatological ROS: negative for - rash or skin lesion change     All other systems reviewed and are negative. History     Past Medical History:   Diagnosis Date    Anxiety     Arthritis     osteo-hips    Asthma     as a child only    Bilateral primary osteoarthritis of hip 7/14/2016    Broken rib april 2013    3 broken ribs s/p fall with hemothorax    Chronic pain     hips, legs and knees    Hypertension     Psychiatric disorder     anxiety      Past Surgical History:   Procedure Laterality Date    HX HERNIA REPAIR Left     HX LUMBAR DISKECTOMY      HX MOHS PROCEDURES      (R) rotator cuff     Current Outpatient Medications   Medication Sig Dispense Refill    gabapentin (NEURONTIN) 300 mg capsule Take 1 Cap by mouth three (3) times daily. 270 Cap 3    LORazepam (ATIVAN) 1 mg tablet Take 1 Tab by mouth every eight (8) hours as needed for Anxiety. Max Daily Amount: 3 mg. 30 Tab 2    folic acid (FOLVITE) 1 mg tablet Take 1 Tab by mouth daily. 90 Tab 3    thiamine mononitrate (B-1) 100 mg tablet Take 1 Tab by mouth daily.  90 Tab 3    atorvastatin (LIPITOR) 80 mg tablet Take 1 Tab by mouth nightly. 90 Tab 3    aspirin (ASPIRIN) 325 mg tablet Take 1 Tab by mouth daily. 30 Tab 1    amLODIPine (NORVASC) 5 mg tablet TAKE ONE TABLET BY MOUTH ONCE DAILY 30 Tab 11    acetaminophen (TYLENOL EXTRA STRENGTH) 500 mg tablet Take 2 Tabs by mouth every six (6) hours as needed for Pain. 40 Tab 0    cetirizine (ZYRTEC) 10 mg tablet Take 1 Tab by mouth daily. 30 Tab 2    imiquimod (ALDARA) 5 % cream Apply 0.5 Packets to affected area daily. apply a thin layer as directed 24 Packet 6    lidocaine (LIDODERM) 5 % Apply patch to the affected area for 12 hours a day and remove for 12 hours a day. 30 Each 0    tiZANidine (ZANAFLEX) 4 mg tablet Take 1 Tab by mouth every six (6) hours as needed. 30 Tab 0    citalopram (CELEXA) 10 mg tablet Take 1 Tab by mouth every evening. 90 Tab 3    buPROPion SR (WELLBUTRIN SR) 150 mg SR tablet Take 1 Tab by mouth two (2) times a day. 60 Tab 0    albuterol (PROVENTIL HFA, VENTOLIN HFA, PROAIR HFA) 90 mcg/actuation inhaler Take 2 Puffs by inhalation every six (6) hours as needed for Wheezing or Shortness of Breath. 1 Inhaler 12    sildenafil citrate (VIAGRA) 100 mg tablet Take 1 Tab by mouth as needed.  12 Tab 1     No Known Allergies  Family History   Problem Relation Age of Onset    Dementia Mother     Cancer Father 61     Social History     Tobacco Use    Smoking status: Current Every Day Smoker     Packs/day: 0.25     Years: 30.00     Pack years: 7.50    Smokeless tobacco: Never Used    Tobacco comment: pt will try to quit smoking   Substance Use Topics    Alcohol use: No     Alcohol/week: 0.0 standard drinks     Comment: quit 1/2016     Patient Active Problem List   Diagnosis Code    Asthma J45.909    Tobacco abuse Z72.0    Chronic pain G89.29    Anxiety F41.9    Bilateral primary osteoarthritis of hip M16.0    Advance care planning Z71.89    Essential hypertension I10    Status post right hip replacement Z33.878    TIA (transient ischemic attack) G45.9    Hyperlipidemia E78.5       Health Maintenance History  Immunizations reviewed, dtap up to date , pneumovax , flu due, zoster due  Colonoscopy: up to date,   Eye exam: up to date    Depression Risk Factor Screening:      Patient Health Questionnaire (PHQ-2)   Over the last 2 weeks, how often have you been bothered by any of the following problems? · Little interest or pleasure in doing things? · Not at all. [0]  · Feeling down, depressed, or hopeless? · Not at all. [0]    Total Score: 0/6  PHQ-2 Assessment Scoring:   A score of 2 or more requires further screening with the PHQ-9    Alcohol Risk Factor Screening:     Men: On any occasion during the past 3 months, have you had more than 4 drinks containing alcohol?  no  Do you average more than 14 drinks per week?  no    Functional Ability and Level of Safety:     Hearing Loss    Hearing is good. Activities of Daily Living   Self-care. Requires assistance with: no ADLs    Fall Risk   No fall risk factors    Abuse Screen   Patient is not abused    Examination   Physical Examination  Vitals:    08/22/19 1413   BP: 140/87   Pulse: 60   Resp: 16   Temp: 97 °F (36.1 °C)   TempSrc: Oral   SpO2: 96%   Weight: 166 lb 9.6 oz (75.6 kg)   Height: 5' 7\" (1.702 m)   PainSc:   7   PainLoc: Neck      Body mass index is 26.09 kg/m².      Evaluation of Cognitive Function:  Mood/affect:good mood with appropriate affect  Appearance: well kempt  Family member/caregiver input: n/a    alert, well appearing, and in no distress, oriented to person, place, and time and overweight    Patient Care Team:  Nirav Brothers MD as PCP - General (Family Practice)  Fee, Danitza Carlos MD (Family Practice)  Alysa Irwin MD (Surgery)    End-of-life planning  Advanced Directive in the case than an injury or illness causes the patient to be unable to make health care decisions    Health Care Directive or Living Will: yes    Advice/Referrals/Counselling/Plan:   Education and counseling provided:  Are appropriate based on today's review and evaluation  End-of-Life planning (with patient's consent)  Influenza Vaccine  Diabetes screening test  shingles  Include in education list (weight loss, physical activity, smoking cessation, fall prevention, and nutrition)    ICD-10-CM ICD-9-CM    1. Need for immunization against influenza Z23 V04.81 ADMIN INFLUENZA VIRUS VAC      INFLUENZA VIRUS VAC QUAD,SPLIT,PRESV FREE SYRINGE IM   2. DDD (degenerative disc disease), cervical M50.30 722.4    3. Cervical radicular pain M54.12 723.4    4. Myofascial pain on left side M79.18 729.1 INJECT TRIGGER POINT, 1 OR 2      TRIAMCINOLONE ACETONIDE INJ      triamcinolone acetonide (KENALOG) 40 mg/mL injection   5. Dyshidrotic eczema L30.1 705.81 clobetasol (TEMOVATE) 0.05 % ointment   6. Medicare annual wellness visit, subsequent Z00.00 V70.0    7. Advance care planning Z71.89 V65.49 ADVANCE CARE PLANNING FIRST 30 MINS   . Brief written plan, checklist    I have discussed the diagnosis with the patient and the intended plan as seen in the above orders. The patient has received an after-visit summary and questions were answered concerning future plans. I have discussed medication side effects and warnings with the patient as well. I have reviewed the plan of care with the patient, accepted their input and they are in agreement with the treatment goals. ____________________________________________________________    Problem Assessment    for treatment of   Chief Complaint   Patient presents with    Medication Refill    Neck Pain         SUBJECTIVE    . Grisel Mahan for f/u bilateral hand paresthesias which is greatest in the morning when he wakes. Herbie Ryder reports that his hands feel cold.  He reports that he has full range of motion.  His proximal arms are throbbing.  Symptoms have improved after starting gabapentin which was started by ortho spine. He was advised to  Have surgery on cervical spine but he does not want surgery at this time.     Additionally he has had chest congestion for the past 2 weeks. He has used mucinex without improvement. He has not used allergy medication; he has h/o allergy symptoms with pollen.       Osteoarthritis and Chronic Pain:  Patient has osteoarthritis, primarily affecting the neck. Symptoms onset: problem is longstanding. Rheumatological ROS: increasing significant pain in neck. Response to treatment plan: gradually worsening.      Anxiety Review:  He is seen for anxiety disorder, panic attacks. Current treatment includes buspar, Ativan and no other therapies. Ongoing symptoms include: palpitations, sweating, shortness of breath, dizziness, paresthesias, insomnia, racing thoughts, psychomotor agitation, feelings of losing control, difficulty concentrating. Patient denies: suicidal ideation. Reported side effects from the treatment: GI disturbancewith his buspar.     Visit Vitals  /87 (BP 1 Location: Right arm, BP Patient Position: Sitting)   Pulse 60   Temp 97 °F (36.1 °C) (Oral)   Resp 16   Ht 5' 7\" (1.702 m)   Wt 166 lb 9.6 oz (75.6 kg)   SpO2 96%   BMI 26.09 kg/m²     General:  Alert, cooperative, no distress, appears stated age. Head:  Normocephalic, without obvious abnormality, atraumatic. Eyes:  Conjunctivae/corneas clear. PERRL, EOMs intact. Ears:  Normal TMs and external ear canals both ears. Nose: Nares normal. Septum midline. Mucosa normal. No drainage or sinus tenderness. Throat: Lips, mucosa, and tongue normal. Teeth and gums normal.   Neck: Supple, symmetrical, trachea midline, no adenopathy, thyroid: no enlargement/tenderness/nodules   Back:   ttp left trapezius and left side of cervical paraspinals; pain with movement; Symmetric, no curvature. ROM normal.    Lungs:   Clear to auscultation bilaterally. Chest wall:  No tenderness or deformity.    Heart:  Regular rate and rhythm, S1, S2 normal, no murmur, click, rub or gallop. Abdomen:   Soft, non-tender. Bowel sounds normal. No masses,  No organomegaly. Genitalia:  deferred   Rectal:  deferred   Extremities: Extremities normal, atraumatic, no cyanosis or edema. Pulses: 2+ and symmetric all extremities. Skin: Skin color, texture, turgor normal. No rashes or lesions   Lymph nodes: Cervical, supraclavicular, and axillary nodes normal.   Neurologic: CNII-XII intact. Normal strength, sensation and reflexes throughout. [facility]  OFFICE PROCEDURE PROGRESS NOTE        Chart reviewed for the following:   Kajal Hernandez MD, have reviewed the History, Physical and updated the Allergic reactions for [address] performed immediately prior to start of procedure:   I, Candance Place, MD, have performed the following reviews on Mason Peña prior to the start of the procedure:            * Patient was identified by name and date of birth   * Agreement on procedure being performed was verified  * Risks and Benefits explained to the patient  * Procedure site verified and marked as necessary  * Patient was positioned for comfort  * Understanding confirmed and consent was signed and verified     Time: 2:00 PM       Date of procedure: 8/22/2019    Procedure performed by:  Candance Place, MD    Provider assisted by: Dallas Vang LPN    Patient assisted by: self    How tolerated by patient: tolerated the procedure well with no complications    Comments: none      after obtaining informed consent the left trapezius was prepped in sterile fashion; ethyl chloride used for topical anesthesia; 1.5 cc 1:1:1 marcaine 0.5%, lidocaine 1% without epi and kenalog 40 mg/cc injected into 2 trigger points; EBL < 1 cc; post procedure pain 4/10    Assessment/Plan:    1. Need for immunization against influenza    - ADMIN INFLUENZA VIRUS VAC  - INFLUENZA VIRUS VAC QUAD,SPLIT,PRESV FREE SYRINGE IM    2.  DDD (degenerative disc disease), cervical  Exercises demonstrated    3. Cervical radicular pain  Pain management    4. Myofascial pain on left side  Improved with injection  - INJECT TRIGGER POINT, 1 OR 2  - TRIAMCINOLONE ACETONIDE INJ  - triamcinolone acetonide (KENALOG) 40 mg/mL injection; 1 mL by IntraMUSCular route once for 1 dose. Dispense: 1 mL; Refill: 0    5. Dyshidrotic eczema    - clobetasol (TEMOVATE) 0.05 % ointment; Apply  to affected area two (2) times a day. Dispense: 60 g; Refill: 0    6. Medicare annual wellness visit, subsequent  Reviewed preventive recommendations    7.  Advance care planning  See ACP  - ADVANCE CARE PLANNING FIRST 30 MINS      .bmi    Lab review: no lab studies available for review at time of visit

## 2019-10-21 DIAGNOSIS — I10 ESSENTIAL HYPERTENSION: ICD-10-CM

## 2019-10-21 RX ORDER — AMLODIPINE BESYLATE 5 MG/1
TABLET ORAL
Qty: 30 TAB | Refills: 11 | Status: SHIPPED | OUTPATIENT
Start: 2019-10-21 | End: 2020-10-06

## 2019-11-13 ENCOUNTER — OFFICE VISIT (OUTPATIENT)
Dept: FAMILY MEDICINE CLINIC | Age: 59
End: 2019-11-13

## 2019-11-13 VITALS
BODY MASS INDEX: 26.53 KG/M2 | SYSTOLIC BLOOD PRESSURE: 136 MMHG | WEIGHT: 169 LBS | HEART RATE: 64 BPM | DIASTOLIC BLOOD PRESSURE: 80 MMHG | HEIGHT: 67 IN | OXYGEN SATURATION: 97 % | TEMPERATURE: 98.7 F | RESPIRATION RATE: 16 BRPM

## 2019-11-13 DIAGNOSIS — M79.18 MYOFASCIAL PAIN ON LEFT SIDE: ICD-10-CM

## 2019-11-13 DIAGNOSIS — G45.9 TIA (TRANSIENT ISCHEMIC ATTACK): ICD-10-CM

## 2019-11-13 DIAGNOSIS — I10 ESSENTIAL HYPERTENSION: ICD-10-CM

## 2019-11-13 DIAGNOSIS — M75.42 SHOULDER IMPINGEMENT, LEFT: Primary | ICD-10-CM

## 2019-11-13 DIAGNOSIS — L30.1 DYSHIDROTIC ECZEMA: ICD-10-CM

## 2019-11-13 RX ORDER — ASPIRIN 325 MG/1
100 TABLET, FILM COATED ORAL DAILY
Qty: 90 TAB | Refills: 3 | Status: SHIPPED | OUTPATIENT
Start: 2019-11-13 | End: 2020-01-24 | Stop reason: SDUPTHER

## 2019-11-13 RX ORDER — METHOCARBAMOL 750 MG/1
750 TABLET, FILM COATED ORAL 4 TIMES DAILY
Qty: 120 TAB | Refills: 1 | Status: SHIPPED | OUTPATIENT
Start: 2019-11-13 | End: 2020-05-28 | Stop reason: SDUPTHER

## 2019-11-13 RX ORDER — CLOBETASOL PROPIONATE 0.5 MG/G
OINTMENT TOPICAL 2 TIMES DAILY
Qty: 60 G | Refills: 0 | Status: SHIPPED | OUTPATIENT
Start: 2019-11-13 | End: 2022-07-11 | Stop reason: SDUPTHER

## 2019-11-13 RX ORDER — TRIAMCINOLONE ACETONIDE 40 MG/ML
40 INJECTION, SUSPENSION INTRA-ARTICULAR; INTRAMUSCULAR ONCE
Qty: 1 ML | Refills: 0
Start: 2019-11-13 | End: 2019-11-13

## 2019-11-13 RX ORDER — FOLIC ACID 1 MG/1
1 TABLET ORAL DAILY
Qty: 90 TAB | Refills: 3 | Status: SHIPPED | OUTPATIENT
Start: 2019-11-13 | End: 2020-01-24 | Stop reason: SDUPTHER

## 2019-11-13 NOTE — PROGRESS NOTES
1. Have you been to the ER, urgent care clinic since your last visit? Hospitalized since your last visit? No    2. Have you seen or consulted any other health care providers outside of the [address] [place] [address] since your last visit? Include any pap smears or colon screening. No       Patient presents in office today for routine care.   Patient concerns: neck and left shoulder pain

## 2019-11-13 NOTE — PROGRESS NOTES
Yandel uW is a 60 y.o.  male and presents with    Chief Complaint   Patient presents with    Neck Pain    Shoulder Pain     Subjective:  Shoulder Pain  Patient complains of left side shoulder pain. The symptoms began about a month ago Course of symptoms since onset has been gradually worsening. Pain is described as location: between neck and shoulder. Symptoms were incited by repetitive activity, overhead motion. Patient denies hematemesis, melena, fever. Therapy to date includes rest and ice. Osteoarthritis and Chronic Pain:  Patient has osteoarthritis, primarily affecting the neck. Symptoms onset: problem is longstanding. Rheumatological ROS: increasing significant pain in neck. Response to treatment plan: gradually worsening.      Anxiety Review:  He is seen for anxiety disorder, panic attacks. Current treatment includes  Ativan and no other therapies. Ongoing symptoms include: palpitations, sweating, shortness of breath, dizziness, paresthesias, insomnia, racing thoughts, psychomotor agitation, feelings of losing control, difficulty concentrating. Patient denies: suicidal ideation. ROS   General ROS: negative for - chills or fever  Ophthalmic ROS: positive for - uses glasses  ENT ROS: negative for - headaches, nasal discharge or sore throat  Endocrine ROS: negative for - polydipsia/polyuria or temperature intolerance  Respiratory ROS: no cough, shortness of breath, or wheezing  Cardiovascular ROS: no chest pain or dyspnea on exertion  Gastrointestinal ROS: no abdominal pain, change in bowel habits, or black or bloody stools  Genito-Urinary ROS: no dysuria, trouble voiding, or hematuria  Musculoskeletal ROS: positive for - pain in arm - left  Dermatological ROS: negative for - rash or skin lesion change     All other systems reviewed and are negative.     Objective:  Vitals:    11/13/19 0948   BP: 136/80   Pulse: 64   Resp: 16   Temp: 98.7 °F (37.1 °C)   TempSrc: Oral   SpO2: 97%   Weight: 169 lb (76.7 kg)   Height: 5' 7\" (1.702 m)   PainSc:   9   PainLoc: Generalized     alert, well appearing, and in no distress, oriented to person, place, and time and normal appearing weight  Mental status - normal mood, behavior, speech, dress, motor activity, and thought processes  Mouth - mucous membranes moist, pharynx normal without lesions  Chest - clear to auscultation, no wheezes, rales or rhonchi, symmetric air entry  Heart - normal rate, regular rhythm, normal S1, S2, no murmurs, rubs, clicks or gallops  Neuro - CN II-XII intact; no motor deficit on exam  Shoulder exam:  Right shoulder: No erythema, edema, or bruising. Nontender to acromioclavicular joint. Nontender to subacromial bursa. No impingement signs. No glenohumeral laxity. Left shoulder: No erythema, edema, or bruising. Nontender to acromioclavicular joint. Tender to subacromial bursa. POS impingment signs No glenohumeral laxity.   Skin - vesicles and fissures on fingers    [facility]  OFFICE PROCEDURE PROGRESS NOTE        Chart reviewed for the following:   Anca Sow MD, have reviewed the History, Physical and updated the Allergic reactions for [address] performed immediately prior to start of procedure:   I, Abrahan Albrecht MD, have performed the following reviews on [facility] prior to the start of the procedure:            * Patient was identified by name and date of birth   * Agreement on procedure being performed was verified  * Risks and Benefits explained to the patient  * Procedure site verified and marked as necessary  * Patient was positioned for comfort  * Understanding confirmed and consent was signed and verified     Time: .5:24 PM       Date of procedure: 11/13/2019    Procedure performed by:  Abrahan Albrecht MD    Provider assisted by: Eder Hussein LPN    Patient assisted by: self    How tolerated by patient: tolerated the procedure well with no complications    Comments: none  after obtaining informed consent the Right shoulder was prepped in sterile fashion; ethyl chloride used for topical anesthesia; 3 cc 1:1:1 marcaine 0.5%, lidocaine 1% without epi and kenalog 40 mg/cc injected into shoulder joint; EBL < 1 cc; post procedure pain 0/10    Assessment/Plan:    1. Dyshidrotic eczema    - clobetasol (TEMOVATE) 0.05 % ointment; Apply  to affected area two (2) times a day. Dispense: 60 g; Refill: 0    2. TIA (transient ischemic attack)    - folic acid (FOLVITE) 1 mg tablet; Take 1 Tab by mouth daily. Dispense: 90 Tab; Refill: 3  - thiamine mononitrate (B-1) 100 mg tablet; Take 1 Tab by mouth daily. Dispense: 90 Tab; Refill: 3    3. Shoulder impingement, left  Immediate improvement  - TRIAMCINOLONE ACETONIDE INJ  - DRAIN/INJECT LARGE JOINT/BURSA    4. Essential hypertension  Goal <130/80    5. Myofascial pain on left side  Stretching and strengthening exercises recommended    Lab review: no lab studies available for review at time of visit      I have discussed the diagnosis with the patient and the intended plan as seen in the above orders. The patient has received an after-visit summary and questions were answered concerning future plans. I have discussed medication side effects and warnings with the patient as well. I have reviewed the plan of care with the patient, accepted their input and they are in agreement with the treatment goals.

## 2019-12-09 DIAGNOSIS — F41.9 ANXIETY: ICD-10-CM

## 2019-12-10 RX ORDER — LORAZEPAM 1 MG/1
TABLET ORAL
Qty: 30 TAB | Refills: 2 | Status: SHIPPED | OUTPATIENT
Start: 2019-12-10 | End: 2020-02-10 | Stop reason: SDUPTHER

## 2019-12-11 DIAGNOSIS — M50.30 DDD (DEGENERATIVE DISC DISEASE), CERVICAL: ICD-10-CM

## 2019-12-11 DIAGNOSIS — M54.12 CERVICAL RADICULAR PAIN: ICD-10-CM

## 2019-12-11 NOTE — TELEPHONE ENCOUNTER
Requested Prescriptions     Pending Prescriptions Disp Refills    gabapentin (NEURONTIN) 300 mg capsule 270 Cap 3     Sig: Take 1 Cap by mouth three (3) times daily. Patient stated he has refills but the prescription . Please advise.

## 2019-12-13 RX ORDER — GABAPENTIN 300 MG/1
300 CAPSULE ORAL 3 TIMES DAILY
Qty: 270 CAP | Refills: 3 | Status: SHIPPED | OUTPATIENT
Start: 2019-12-13 | End: 2021-01-14

## 2020-01-24 DIAGNOSIS — G45.9 TIA (TRANSIENT ISCHEMIC ATTACK): ICD-10-CM

## 2020-01-24 NOTE — TELEPHONE ENCOUNTER
Pt called in and informed me that the pharmacy told him that he did not have any refills of these two medications. Please advise.

## 2020-01-24 NOTE — TELEPHONE ENCOUNTER
Please see medication refill request. Pharmacy stated no refills remaining. Please advise. Thank you.

## 2020-01-25 RX ORDER — ASPIRIN 325 MG/1
100 TABLET, FILM COATED ORAL DAILY
Qty: 90 TAB | Refills: 3 | Status: SHIPPED | OUTPATIENT
Start: 2020-01-25 | End: 2021-05-17 | Stop reason: SDUPTHER

## 2020-01-25 RX ORDER — FOLIC ACID 1 MG/1
1 TABLET ORAL DAILY
Qty: 90 TAB | Refills: 3 | Status: SHIPPED | OUTPATIENT
Start: 2020-01-25 | End: 2021-02-09

## 2020-02-01 DIAGNOSIS — J45.40 MODERATE PERSISTENT ASTHMA WITHOUT COMPLICATION: ICD-10-CM

## 2020-02-03 RX ORDER — ALBUTEROL SULFATE 90 UG/1
AEROSOL, METERED RESPIRATORY (INHALATION)
Qty: 18 G | Refills: 0 | Status: SHIPPED | OUTPATIENT
Start: 2020-02-03 | End: 2020-03-23

## 2020-02-10 ENCOUNTER — OFFICE VISIT (OUTPATIENT)
Dept: FAMILY MEDICINE CLINIC | Age: 60
End: 2020-02-10

## 2020-02-10 VITALS
WEIGHT: 177 LBS | OXYGEN SATURATION: 98 % | HEIGHT: 67 IN | TEMPERATURE: 96.8 F | RESPIRATION RATE: 16 BRPM | HEART RATE: 66 BPM | DIASTOLIC BLOOD PRESSURE: 87 MMHG | SYSTOLIC BLOOD PRESSURE: 143 MMHG | BODY MASS INDEX: 27.78 KG/M2

## 2020-02-10 DIAGNOSIS — M50.30 DDD (DEGENERATIVE DISC DISEASE), CERVICAL: ICD-10-CM

## 2020-02-10 DIAGNOSIS — M54.12 CERVICAL RADICULAR PAIN: ICD-10-CM

## 2020-02-10 DIAGNOSIS — F41.9 ANXIETY: ICD-10-CM

## 2020-02-10 DIAGNOSIS — I10 ESSENTIAL HYPERTENSION: ICD-10-CM

## 2020-02-10 DIAGNOSIS — M75.42 SHOULDER IMPINGEMENT, LEFT: Primary | ICD-10-CM

## 2020-02-10 RX ORDER — LORAZEPAM 1 MG/1
TABLET ORAL
Qty: 30 TAB | Refills: 2 | Status: SHIPPED | OUTPATIENT
Start: 2020-02-10 | End: 2020-07-17

## 2020-02-10 RX ORDER — TRIAMCINOLONE ACETONIDE 40 MG/ML
40 INJECTION, SUSPENSION INTRA-ARTICULAR; INTRAMUSCULAR ONCE
Qty: 1 ML | Refills: 0
Start: 2020-02-10 | End: 2020-02-10

## 2020-02-10 NOTE — PROGRESS NOTES
Karma Nelson is a 60 y.o.  male and presents with    Chief Complaint   Patient presents with    Shoulder Pain    Anxiety    Hypertension     Subjective:  Shoulder Pain  Mr. Roney Hutchins complains of left side shoulder pain. The symptoms began about a month ago Course of symptoms since onset has been gradually worsening. Pain is described as location: between neck and shoulder. Symptoms were incited by repetitive activity, overhead motion. Patient denies hematemesis, melena, fever. Therapy to date includes injection; rest and ice. He had good results with injection. Osteoarthritis and Chronic Pain:  Patient has osteoarthritis, primarily affecting the neck. He is scheduled this week for epidural steroid injection. He was advised on this by ortho spine surgeon. Symptoms onset: problem is longstanding. Rheumatological ROS: increasing significant pain in neck. Response to treatment plan: gradually worsening.      Anxiety Review:  He is seen for anxiety disorder, panic attacks. Current treatment includes  Ativan and no other therapies. Ongoing symptoms include: palpitations, sweating, shortness of breath, dizziness, paresthesias, insomnia, racing thoughts, psychomotor agitation, feelings of losing control, difficulty concentrating.    Patient denies: suicidal ideation.      ROS   General ROS: negative for - chills or fever  Ophthalmic ROS: positive for - uses glasses  ENT ROS: negative for - headaches, nasal discharge or sore throat  Endocrine ROS: negative for - polydipsia/polyuria or temperature intolerance  Respiratory ROS: no cough, shortness of breath, or wheezing  Cardiovascular ROS: no chest pain or dyspnea on exertion  Gastrointestinal ROS: no abdominal pain, change in bowel habits, or black or bloody stools  Genito-Urinary ROS: no dysuria, trouble voiding, or hematuria  Musculoskeletal ROS: positive for - pain in arm - left  Dermatological ROS: negative for - rash or skin lesion change     All other systems reviewed and are negative. Objective:  Vitals:    02/10/20 0912   BP: 143/87   Pulse: 66   Resp: 16   Temp: 96.8 °F (36 °C)   TempSrc: Oral   SpO2: 98%   Weight: 177 lb (80.3 kg)   Height: 5' 7\" (1.702 m)   PainSc:  10 - Worst pain ever   PainLoc: Generalized     alert, well appearing, and in no distress, oriented to person, place, and time and normal appearing weight  Mental status - normal mood, behavior, speech, dress, motor activity, and thought processes  Mouth - mucous membranes moist, pharynx normal without lesions  Chest - clear to auscultation, no wheezes, rales or rhonchi, symmetric air entry  Heart - normal rate, regular rhythm, normal S1, S2, no murmurs, rubs, clicks or gallops  Neuro - CN II-XII intact; no motor deficit on exam  Shoulder exam:  Right shoulder: No erythema, edema, or bruising. Nontender to acromioclavicular joint. Nontender to subacromial bursa. No impingement signs. No glenohumeral laxity. Left shoulder: No erythema, edema, or bruising. Nontender to acromioclavicular joint. Tender to subacromial bursa. POS impingment signs No glenohumeral laxity.   Skin - vesicles and fissures on fingers  [facility]  OFFICE PROCEDURE PROGRESS NOTE        Chart reviewed for the following:   Justino Pierre MD, have reviewed the History, Physical and updated the Allergic reactions for [address] performed immediately prior to start of procedure:   I, Aditi Aaron MD, have performed the following reviews on [place]  prior to the start of the procedure:            * Patient was identified by name and date of birth   * Agreement on procedure being performed was verified  * Risks and Benefits explained to the patient  * Procedure site verified and marked as necessary  * Patient was positioned for comfort  * Understanding confirmed and consent was signed and verified     Time: .10:10 AM       Date of procedure: 2/10/2020    Procedure performed by:  Kandice Leventhal, MD    Provider assisted by: Hanna Boyle LPN    Patient assisted by: self    How tolerated by patient: tolerated the procedure well with no complications    Comments: none    after obtaining informed consent the left shoulder was prepped in sterile fashion; ethyl chloride used for topical anesthesia; 3 cc 1:1:1 marcaine 0.5%, lidocaine 1% without epi and kenalog 40 mg/cc injected into shoulder joint; EBL < 1 cc; post procedure pain 5/10      Assessment/Plan:    1. Shoulder impingement, left    - DRAIN/INJECT LARGE JOINT/BURSA  - TRIAMCINOLONE ACETONIDE INJ  - triamcinolone acetonide (KENALOG) 40 mg/mL injection; 1 mL by IntraBURSal route once for 1 dose. Dispense: 1 mL; Refill: 0    2. Cervical radicular pain      3. DDD (degenerative disc disease), cervical    4. Anxiety  Continue bzd with caution  - LORazepam (ATIVAN) 1 mg tablet; TAKE 1 TABLET BY MOUTH EVERY 8 HOURS AS NEEDED FOR ANXIETY . DO NOT EXCEED 3 PER 24 HOURS  Dispense: 30 Tab; Refill: 2    5. Essential hypertension  Goal <130/80      Lab review: no lab studies available for review at time of visit      I have discussed the diagnosis with the patient and the intended plan as seen in the above orders. The patient has received an after-visit summary and questions were answered concerning future plans. I have discussed medication side effects and warnings with the patient as well. I have reviewed the plan of care with the patient, accepted their input and they are in agreement with the treatment goals.

## 2020-02-10 NOTE — PROGRESS NOTES
1. Have you been to the ER, urgent care clinic since your last visit? Hospitalized since your last visit? No    2. Have you seen or consulted any other health care providers outside of the [address] [place] [address] since your last visit? Include any pap smears or colon screening. Yes, [facility], shoulder and neck pain. Patient presents in office today for routine care. Patient concerns: med refill, left shoulder and left arm pain.

## 2020-03-23 DIAGNOSIS — J45.40 MODERATE PERSISTENT ASTHMA WITHOUT COMPLICATION: ICD-10-CM

## 2020-03-23 RX ORDER — ALBUTEROL SULFATE 90 UG/1
AEROSOL, METERED RESPIRATORY (INHALATION)
Qty: 18 G | Refills: 0 | Status: SHIPPED | OUTPATIENT
Start: 2020-03-23 | End: 2020-06-08

## 2020-04-07 DIAGNOSIS — G45.9 TIA (TRANSIENT ISCHEMIC ATTACK): ICD-10-CM

## 2020-04-09 RX ORDER — ATORVASTATIN CALCIUM 80 MG/1
TABLET, FILM COATED ORAL
Qty: 90 TAB | Refills: 0 | Status: SHIPPED | OUTPATIENT
Start: 2020-04-09 | End: 2020-07-17

## 2020-05-28 ENCOUNTER — CLINICAL SUPPORT (OUTPATIENT)
Dept: FAMILY MEDICINE CLINIC | Age: 60
End: 2020-05-28

## 2020-05-28 VITALS
RESPIRATION RATE: 16 BRPM | BODY MASS INDEX: 27.31 KG/M2 | HEART RATE: 75 BPM | OXYGEN SATURATION: 97 % | HEIGHT: 67 IN | SYSTOLIC BLOOD PRESSURE: 149 MMHG | WEIGHT: 174 LBS | DIASTOLIC BLOOD PRESSURE: 84 MMHG | TEMPERATURE: 99.5 F

## 2020-05-28 DIAGNOSIS — M54.12 CERVICAL RADICULAR PAIN: ICD-10-CM

## 2020-05-28 DIAGNOSIS — F41.9 ANXIETY: ICD-10-CM

## 2020-05-28 DIAGNOSIS — Z48.02 VISIT FOR SUTURE REMOVAL: ICD-10-CM

## 2020-05-28 DIAGNOSIS — R60.0 LOWER EXTREMITY EDEMA: ICD-10-CM

## 2020-05-28 DIAGNOSIS — M50.30 DDD (DEGENERATIVE DISC DISEASE), CERVICAL: ICD-10-CM

## 2020-05-28 DIAGNOSIS — J44.1 COPD EXACERBATION (HCC): Primary | ICD-10-CM

## 2020-05-28 DIAGNOSIS — I10 ESSENTIAL HYPERTENSION: ICD-10-CM

## 2020-05-28 DIAGNOSIS — R06.2 WHEEZING: ICD-10-CM

## 2020-05-28 RX ORDER — AZITHROMYCIN 250 MG/1
TABLET, FILM COATED ORAL
Qty: 6 TAB | Refills: 0 | Status: SHIPPED | OUTPATIENT
Start: 2020-05-28 | End: 2020-06-02

## 2020-05-28 RX ORDER — METHOCARBAMOL 750 MG/1
750 TABLET, FILM COATED ORAL 4 TIMES DAILY
Qty: 120 TAB | Refills: 1 | Status: SHIPPED | OUTPATIENT
Start: 2020-05-28 | End: 2021-01-14 | Stop reason: ALTCHOICE

## 2020-05-28 RX ORDER — PREDNISONE 10 MG/1
TABLET ORAL
Qty: 30 TAB | Refills: 0 | Status: SHIPPED | OUTPATIENT
Start: 2020-05-28 | End: 2021-01-14

## 2020-05-28 NOTE — PROGRESS NOTES
Sushma Weston presents today for   Chief Complaint   Patient presents with    Suture Removal       Is someone accompanying this pt? no    Is the patient using any DME equipment during OV? no    Depression Screening:  3 most recent PHQ Screens 5/31/2019   Little interest or pleasure in doing things Not at all   Feeling down, depressed, irritable, or hopeless Not at all   Total Score PHQ 2 0       Learning Assessment:  Learning Assessment 5/31/2019   PRIMARY LEARNER Patient   HIGHEST LEVEL OF EDUCATION - PRIMARY LEARNER  DID NOT GRADUATE HIGH SCHOOL   BARRIERS PRIMARY LEARNER NONE   CO-LEARNER CAREGIVER No   PRIMARY LANGUAGE ENGLISH   LEARNER PREFERENCE PRIMARY LISTENING   ANSWERED BY patient   RELATIONSHIP SELF       Abuse Screening:  Abuse Screening Questionnaire 5/31/2019   Do you ever feel afraid of your partner? N   Are you in a relationship with someone who physically or mentally threatens you? N   Is it safe for you to go home? Y       Fall Risk  Fall Risk Assessment, last 12 mths 5/31/2019   Able to walk? Yes   Fall in past 12 months? No       Health Maintenance reviewed and discussed and ordered per Provider. Health Maintenance Due   Topic Date Due    Shingrix Vaccine Age 49> (1 of 2) 03/14/2009    Lipid Screen  11/19/2019   . Coordination of Care:  1. Have you been to the ER, urgent care clinic since your last visit? Hospitalized since your last visit? Yes, 5/23/20, [facility], laceration     2. Have you seen or consulted any other health care providers outside of the [address] [place] [address] since your last visit? Include any pap smears or colon screening. no      Last  Checked na  Last UDS Checked na  Last Pain contract signed: na    Patient presents in office today for routine care.   Patient concerns: suture removal

## 2020-05-28 NOTE — PROGRESS NOTES
Juno Dewitt is a 63 y.o.  male and presents with    Chief Complaint   Patient presents with    Suture Removal       Subjective:  He is here for suture removal after head trauma in his home. He did not lose consciousness. He has 9 suture in his forehead. Injury occurred 6 days ago. COPD Review:  The patient is being seen for follow up of COPD. He has increased cough  Oxygen: He currently is not on home oxygen therapy. Symptoms: cough: severity: moderate, copious: sputum : clear: moderate  wheezing. Patient uses 2 pillows at night. Patient does smoke cigarettes. Osteoarthritis and Chronic Pain:  Patient has osteoarthritis, primarily affecting the neck. He had epidural steroid injection 3 months ago and had relief for 2 hours. He was advised on this by ortho spine surgeon. Symptoms onset: problem is longstanding. Rheumatological ROS: increasing significant pain in neck. Response to treatment plan: gradually worsening.      Anxiety Review:  He is seen for anxiety disorder, panic attacks. Current treatment includes  Ativan and no other therapies. Ongoing symptoms include: palpitations, sweating, shortness of breath, dizziness, paresthesias, insomnia, racing thoughts, psychomotor agitation, feelings of losing control, difficulty concentrating.    Patient denies: suicidal ideation.      ROS   General ROS: negative for - chills or fever  Ophthalmic ROS: positive for - uses glasses  ENT ROS: negative for - headaches, nasal discharge or sore throat  Endocrine ROS: negative for - polydipsia/polyuria or temperature intolerance  Cardiovascular ROS: no chest pain or dyspnea on exertion  Gastrointestinal ROS: no abdominal pain, change in bowel habits, or black or bloody stools  Genito-Urinary ROS: no dysuria, trouble voiding, or hematuria  Musculoskeletal ROS: positive for - pain in arm - left  Dermatological ROS: negative for - rash or skin lesion change     All other systems reviewed and are negative. Objective:  Vitals:    05/28/20 1603   BP: 149/84   Pulse: 75   Resp: 16   Temp: 99.5 °F (37.5 °C)   TempSrc: Oral   SpO2: 97%   Weight: 174 lb (78.9 kg)   Height: 5' 7\" (1.702 m)   PainSc:   0 - No pain       alert, well appearing, and in no distress, oriented to person, place, and time and overweight  Mental status - normal mood, behavior, speech, dress, motor activity, and thought processes  Chest - coarse breath sounds  Skin - 9 suture (s) were removed by  MD without difficulty. Wound edges were well approximated. Steri-strips were used. There was not evidence of infection. Patient did tolerate well. Assessment/Plan:    1. COPD exacerbation ([address]  Start corticosteroid and abx  - predniSONE (DELTASONE) 10 mg tablet; 4 per day x 3 days, then 3 per day x 3 days, then 2 per day x 3 days, then 1 per day x 3 days, then DC  Dispense: 30 Tab; Refill: 0  - azithromycin (ZITHROMAX) 250 mg tablet; Take 2 tablets today, then take 1 tablet daily  Dispense: 6 Tab; Refill: 0    2. Visit for suture removal  Steri strips applied    3. Cervical radicular pain  Muscle relaxer; f/u with ortho spine    4. DDD (degenerative disc disease), cervical    - methocarbamoL (ROBAXIN) 750 mg tablet; Take 1 Tab by mouth four (4) times daily. Dispense: 120 Tab; Refill: 1    5. Anxiety  Mood improved    6. Essential hypertension  Goal <130/80      Lab review: no lab studies available for review at time of visit      I have discussed the diagnosis with the patient and the intended plan as seen in the above orders. The patient has received an after-visit summary and questions were answered concerning future plans. I have discussed medication side effects and warnings with the patient as well. I have reviewed the plan of care with the patient, accepted their input and they are in agreement with the treatment goals.

## 2020-06-06 DIAGNOSIS — J45.40 MODERATE PERSISTENT ASTHMA WITHOUT COMPLICATION: ICD-10-CM

## 2020-06-08 RX ORDER — ALBUTEROL SULFATE 90 UG/1
AEROSOL, METERED RESPIRATORY (INHALATION)
Qty: 18 G | Refills: 0 | Status: SHIPPED | OUTPATIENT
Start: 2020-06-08 | End: 2020-06-16

## 2020-06-12 ENCOUNTER — TELEPHONE (OUTPATIENT)
Dept: FAMILY MEDICINE CLINIC | Age: 60
End: 2020-06-12

## 2020-06-15 DIAGNOSIS — L30.1 DYSHIDROTIC ECZEMA: ICD-10-CM

## 2020-06-15 DIAGNOSIS — J45.40 MODERATE PERSISTENT ASTHMA WITHOUT COMPLICATION: ICD-10-CM

## 2020-06-15 RX ORDER — CLOBETASOL PROPIONATE 0.5 MG/G
OINTMENT TOPICAL 2 TIMES DAILY
Qty: 60 G | Refills: 0 | Status: SHIPPED | OUTPATIENT
Start: 2020-06-15 | End: 2022-07-11 | Stop reason: SDUPTHER

## 2020-06-16 RX ORDER — ALBUTEROL SULFATE 90 UG/1
AEROSOL, METERED RESPIRATORY (INHALATION)
Qty: 18 G | Refills: 0 | Status: SHIPPED | OUTPATIENT
Start: 2020-06-16 | End: 2020-07-17

## 2020-09-09 DIAGNOSIS — F41.9 ANXIETY: ICD-10-CM

## 2020-09-11 RX ORDER — LORAZEPAM 1 MG/1
TABLET ORAL
Qty: 30 TAB | Refills: 0 | Status: SHIPPED | OUTPATIENT
Start: 2020-09-11 | End: 2020-10-12 | Stop reason: SDUPTHER

## 2020-10-12 DIAGNOSIS — F41.9 ANXIETY: ICD-10-CM

## 2020-10-12 DIAGNOSIS — J45.40 MODERATE PERSISTENT ASTHMA WITHOUT COMPLICATION: ICD-10-CM

## 2020-10-14 RX ORDER — ALBUTEROL SULFATE 90 UG/1
AEROSOL, METERED RESPIRATORY (INHALATION)
Qty: 18 G | Refills: 0 | Status: SHIPPED | OUTPATIENT
Start: 2020-10-14 | End: 2020-12-23 | Stop reason: SDUPTHER

## 2020-10-14 RX ORDER — LORAZEPAM 1 MG/1
TABLET ORAL
Qty: 30 TAB | Refills: 0 | Status: SHIPPED | OUTPATIENT
Start: 2020-10-14 | End: 2020-12-04

## 2020-11-29 DIAGNOSIS — F41.9 ANXIETY: ICD-10-CM

## 2020-12-04 RX ORDER — LORAZEPAM 1 MG/1
TABLET ORAL
Qty: 30 TAB | Refills: 0 | Status: SHIPPED | OUTPATIENT
Start: 2020-12-04 | End: 2021-01-14 | Stop reason: SDUPTHER

## 2020-12-09 DIAGNOSIS — F41.9 ANXIETY: ICD-10-CM

## 2020-12-09 DIAGNOSIS — J45.40 MODERATE PERSISTENT ASTHMA WITHOUT COMPLICATION: ICD-10-CM

## 2020-12-11 RX ORDER — ALBUTEROL SULFATE 90 UG/1
AEROSOL, METERED RESPIRATORY (INHALATION)
Qty: 18 G | Refills: 0 | OUTPATIENT
Start: 2020-12-11

## 2020-12-11 RX ORDER — LORAZEPAM 1 MG/1
TABLET ORAL
Qty: 30 TAB | Refills: 0 | OUTPATIENT
Start: 2020-12-11

## 2020-12-23 DIAGNOSIS — J45.40 MODERATE PERSISTENT ASTHMA WITHOUT COMPLICATION: ICD-10-CM

## 2020-12-23 RX ORDER — ALBUTEROL SULFATE 90 UG/1
AEROSOL, METERED RESPIRATORY (INHALATION)
Qty: 2 INHALER | Refills: 2 | Status: SHIPPED | OUTPATIENT
Start: 2020-12-23 | End: 2021-11-26 | Stop reason: SDUPTHER

## 2020-12-23 NOTE — TELEPHONE ENCOUNTER
Pt had called in back on the 9th of December and had requested a refill of his inhaler with his ativan. The ativan had been filled but the inhaler wasn't. It's been two weeks and patient still has not received he inhaler at his pharmacy. Please advise.

## 2021-01-11 DIAGNOSIS — F41.9 ANXIETY: ICD-10-CM

## 2021-01-14 ENCOUNTER — HOSPITAL ENCOUNTER (OUTPATIENT)
Dept: LAB | Age: 61
Discharge: HOME | End: 2021-01-14
Payer: MEDICARE

## 2021-01-14 ENCOUNTER — OFFICE VISIT (OUTPATIENT)
Dept: FAMILY MEDICINE CLINIC | Age: 61
End: 2021-01-14
Payer: MEDICARE

## 2021-01-14 VITALS
OXYGEN SATURATION: 96 % | HEIGHT: 67 IN | SYSTOLIC BLOOD PRESSURE: 124 MMHG | RESPIRATION RATE: 18 BRPM | DIASTOLIC BLOOD PRESSURE: 82 MMHG | HEART RATE: 63 BPM | TEMPERATURE: 98.2 F | WEIGHT: 176.8 LBS | BODY MASS INDEX: 27.75 KG/M2

## 2021-01-14 DIAGNOSIS — Z71.89 ADVANCE CARE PLANNING: ICD-10-CM

## 2021-01-14 DIAGNOSIS — S46.012A TRAUMATIC INCOMPLETE TEAR OF LEFT ROTATOR CUFF, INITIAL ENCOUNTER: ICD-10-CM

## 2021-01-14 DIAGNOSIS — F17.210 CIGARETTE NICOTINE DEPENDENCE WITHOUT COMPLICATION: ICD-10-CM

## 2021-01-14 DIAGNOSIS — Z13.220 ENCOUNTER FOR LIPID SCREENING FOR CARDIOVASCULAR DISEASE: ICD-10-CM

## 2021-01-14 DIAGNOSIS — Z01.818 PRE-OP EXAM: ICD-10-CM

## 2021-01-14 DIAGNOSIS — Z01.818 PRE-OP EXAM: Primary | ICD-10-CM

## 2021-01-14 DIAGNOSIS — M54.2 CERVICAL PAIN: ICD-10-CM

## 2021-01-14 DIAGNOSIS — J45.40 MODERATE PERSISTENT ASTHMA WITHOUT COMPLICATION: ICD-10-CM

## 2021-01-14 DIAGNOSIS — Z00.00 MEDICARE ANNUAL WELLNESS VISIT, SUBSEQUENT: ICD-10-CM

## 2021-01-14 DIAGNOSIS — Z13.6 ENCOUNTER FOR LIPID SCREENING FOR CARDIOVASCULAR DISEASE: ICD-10-CM

## 2021-01-14 DIAGNOSIS — I10 ESSENTIAL HYPERTENSION: ICD-10-CM

## 2021-01-14 DIAGNOSIS — Z23 NEEDS FLU SHOT: ICD-10-CM

## 2021-01-14 DIAGNOSIS — F41.9 ANXIETY: ICD-10-CM

## 2021-01-14 LAB
ANION GAP SERPL CALC-SCNC: 8 MMOL/L (ref 3–18)
BASOPHILS # BLD: 0.1 K/UL (ref 0–0.1)
BASOPHILS NFR BLD: 1 % (ref 0–2)
BUN SERPL-MCNC: 14 MG/DL (ref 7–18)
BUN/CREAT SERPL: 19 (ref 12–20)
CALCIUM SERPL-MCNC: 9.5 MG/DL (ref 8.5–10.1)
CHLORIDE SERPL-SCNC: 108 MMOL/L (ref 100–111)
CHOLEST SERPL-MCNC: 172 MG/DL
CO2 SERPL-SCNC: 26 MMOL/L (ref 21–32)
CREAT SERPL-MCNC: 0.73 MG/DL (ref 0.6–1.3)
DIFFERENTIAL METHOD BLD: ABNORMAL
EOSINOPHIL # BLD: 0.2 K/UL (ref 0–0.4)
EOSINOPHIL NFR BLD: 2 % (ref 0–5)
ERYTHROCYTE [DISTWIDTH] IN BLOOD BY AUTOMATED COUNT: 13.9 % (ref 11.6–14.5)
GLUCOSE SERPL-MCNC: 110 MG/DL (ref 74–99)
HCT VFR BLD AUTO: 42.8 % (ref 36–48)
HDLC SERPL-MCNC: 105 MG/DL (ref 40–60)
HDLC SERPL: 1.6 {RATIO} (ref 0–5)
HGB BLD-MCNC: 14.5 G/DL (ref 13–16)
LDLC SERPL CALC-MCNC: 36 MG/DL (ref 0–100)
LIPID PROFILE,FLP: ABNORMAL
LYMPHOCYTES # BLD: 3.1 K/UL (ref 0.9–3.6)
LYMPHOCYTES NFR BLD: 30 % (ref 21–52)
MCH RBC QN AUTO: 34.5 PG (ref 24–34)
MCHC RBC AUTO-ENTMCNC: 33.9 G/DL (ref 31–37)
MCV RBC AUTO: 101.9 FL (ref 74–97)
MONOCYTES # BLD: 0.8 K/UL (ref 0.05–1.2)
MONOCYTES NFR BLD: 8 % (ref 3–10)
NEUTS SEG # BLD: 5.9 K/UL (ref 1.8–8)
NEUTS SEG NFR BLD: 59 % (ref 40–73)
PLATELET # BLD AUTO: 307 K/UL (ref 135–420)
PMV BLD AUTO: 10.6 FL (ref 9.2–11.8)
POTASSIUM SERPL-SCNC: 4.4 MMOL/L (ref 3.5–5.5)
RBC # BLD AUTO: 4.2 M/UL (ref 4.7–5.5)
SODIUM SERPL-SCNC: 142 MMOL/L (ref 136–145)
TRIGL SERPL-MCNC: 155 MG/DL (ref ?–150)
VLDLC SERPL CALC-MCNC: 31 MG/DL
WBC # BLD AUTO: 10.1 K/UL (ref 4.6–13.2)

## 2021-01-14 PROCEDURE — G8752 SYS BP LESS 140: HCPCS | Performed by: FAMILY MEDICINE

## 2021-01-14 PROCEDURE — G0439 PPPS, SUBSEQ VISIT: HCPCS | Performed by: FAMILY MEDICINE

## 2021-01-14 PROCEDURE — 99497 ADVNCD CARE PLAN 30 MIN: CPT | Performed by: FAMILY MEDICINE

## 2021-01-14 PROCEDURE — G0008 ADMIN INFLUENZA VIRUS VAC: HCPCS | Performed by: FAMILY MEDICINE

## 2021-01-14 PROCEDURE — G8754 DIAS BP LESS 90: HCPCS | Performed by: FAMILY MEDICINE

## 2021-01-14 PROCEDURE — 99214 OFFICE O/P EST MOD 30 MIN: CPT | Performed by: FAMILY MEDICINE

## 2021-01-14 PROCEDURE — 36415 COLL VENOUS BLD VENIPUNCTURE: CPT

## 2021-01-14 PROCEDURE — 80048 BASIC METABOLIC PNL TOTAL CA: CPT

## 2021-01-14 PROCEDURE — G8427 DOCREV CUR MEDS BY ELIG CLIN: HCPCS | Performed by: FAMILY MEDICINE

## 2021-01-14 PROCEDURE — 80061 LIPID PANEL: CPT

## 2021-01-14 PROCEDURE — 99406 BEHAV CHNG SMOKING 3-10 MIN: CPT | Performed by: FAMILY MEDICINE

## 2021-01-14 PROCEDURE — 3017F COLORECTAL CA SCREEN DOC REV: CPT | Performed by: FAMILY MEDICINE

## 2021-01-14 PROCEDURE — 90686 IIV4 VACC NO PRSV 0.5 ML IM: CPT | Performed by: FAMILY MEDICINE

## 2021-01-14 PROCEDURE — G8419 CALC BMI OUT NRM PARAM NOF/U: HCPCS | Performed by: FAMILY MEDICINE

## 2021-01-14 PROCEDURE — 85025 COMPLETE CBC W/AUTO DIFF WBC: CPT

## 2021-01-14 PROCEDURE — G8432 DEP SCR NOT DOC, RNG: HCPCS | Performed by: FAMILY MEDICINE

## 2021-01-14 RX ORDER — LORAZEPAM 1 MG/1
TABLET ORAL
Qty: 30 TAB | Refills: 0 | OUTPATIENT
Start: 2021-01-14

## 2021-01-14 RX ORDER — LORAZEPAM 1 MG/1
1 TABLET ORAL
Qty: 30 TAB | Refills: 0 | Status: SHIPPED | OUTPATIENT
Start: 2021-01-14 | End: 2021-02-19 | Stop reason: SDUPTHER

## 2021-01-14 RX ORDER — OXYCODONE AND ACETAMINOPHEN 5; 325 MG/1; MG/1
1 TABLET ORAL
Qty: 12 TAB | Refills: 0 | Status: SHIPPED | OUTPATIENT
Start: 2021-01-14 | End: 2021-01-21

## 2021-01-14 RX ORDER — CYCLOBENZAPRINE HCL 10 MG
10 TABLET ORAL
Qty: 90 TAB | Refills: 1 | Status: SHIPPED | OUTPATIENT
Start: 2021-01-14
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
## 2021-01-14 NOTE — PROGRESS NOTES
Carmen Freitas presents today for   Chief Complaint   Patient presents with    Pre-op Exam       Is someone accompanying this pt? no    Is the patient using any DME equipment during OV? no    Depression Screening:  3 most recent PHQ Screens 5/31/2019   Little interest or pleasure in doing things Not at all   Feeling down, depressed, irritable, or hopeless Not at all   Total Score PHQ 2 0       Learning Assessment:  Learning Assessment 5/31/2019   PRIMARY LEARNER Patient   HIGHEST LEVEL OF EDUCATION - PRIMARY LEARNER  DID NOT GRADUATE [facility] PRIMARY LEARNER NONE   CO-LEARNER CAREGIVER No   PRIMARY LANGUAGE ENGLISH   LEARNER PREFERENCE PRIMARY LISTENING   ANSWERED BY patient   RELATIONSHIP SELF       Abuse Screening:  Abuse Screening Questionnaire 5/31/2019   Do you ever feel afraid of your partner? N   Are you in a relationship with someone who physically or mentally threatens you? N   Is it safe for you to go home? Y       Fall Risk  Fall Risk Assessment, last 12 mths 5/31/2019   Able to walk? Yes   Fall in past 12 months? No       Health Maintenance reviewed and discussed and ordered per Provider. Health Maintenance Due   Topic Date Due    Shingrix Vaccine Age 49> (1 of 2) 03/14/2009    Lipid Screen  11/19/2019    Medicare Yearly Exam  08/22/2020    Flu Vaccine (1) 09/01/2020   . Coordination of Care:  1. Have you been to the ER, urgent care clinic since your last visit? Hospitalized since your last visit? no    2. Have you seen or consulted any other health care providers outside of the [address] [place] [address] since your last visit? Include any pap smears or colon screening.  no      Last  Checked n/a  Last UDS Checked n/a  Last Pain contract signed: n/a

## 2021-01-14 NOTE — ACP (ADVANCE CARE PLANNING)
Advance Care Planning     Advance Care Planning (ACP) Physician/NP/PA Conversation      Date of Conversation: 1/14/2021  Conducted with: Patient with Decision Making Capacity    Healthcare Decision Maker:     Primary Decision Maker: Flint Meigs - Sister - [phone]  Click here to complete [address] including selection of the Healthcare Decision Maker Relationship (ie \"Primary\")  Today we documented Decision Maker(s) consistent with Legal Next of Kin hierarchy. Care Preferences:    Hospitalization: \"If your health worsens and it becomes clear that your chance of recovery is unlikely, what would be your preference regarding hospitalization? \"  The patient would prefer hospitalization. Ventilation: \"If you were unable to breathe on your own and your chance of recovery was unlikely, what would be your preference about the use of a ventilator (breathing machine) if it was available to you? \"   The patient would desire the use of a ventilator. Resuscitation: \"In the event your heart stopped as a result of an underlying serious health condition, would you want attempts to be made to restart your heart, or would you prefer a natural death? \"   Yes, attempt to resuscitate.     Additional topics discussed: treatment goals, benefit/burden of treatment options, artificial nutrition, ventilation preferences and hospitalization preferences    Conversation Outcomes / Follow-Up Plan:   ACP complete - no further action today  Reviewed DNR/DNI and patient elects Full Code (Attempt Resuscitation)     Length of Voluntary ACP Conversation in minutes:  16 minutes    Alexander East MD

## 2021-01-18 ENCOUNTER — TRANSCRIBE ORDER (OUTPATIENT)
Dept: REGISTRATION | Age: 61
End: 2021-01-18

## 2021-01-18 ENCOUNTER — HOSPITAL ENCOUNTER (OUTPATIENT)
Dept: PREADMISSION TESTING | Age: 61
Discharge: HOME | End: 2021-01-18
Payer: MEDICARE

## 2021-01-18 ENCOUNTER — HOSPITAL ENCOUNTER (OUTPATIENT)
Dept: GENERAL RADIOLOGY | Age: 61
Discharge: HOME | End: 2021-01-18
Payer: MEDICARE

## 2021-01-18 ENCOUNTER — HOSPITAL ENCOUNTER (OUTPATIENT)
Dept: LAB | Age: 61
Discharge: HOME | End: 2021-01-18
Payer: MEDICARE

## 2021-01-18 DIAGNOSIS — Z01.818 PREOPERATIVE TESTING: ICD-10-CM

## 2021-01-18 DIAGNOSIS — Z01.818 PREOPERATIVE TESTING: Primary | ICD-10-CM

## 2021-01-18 PROCEDURE — 71046 X-RAY EXAM CHEST 2 VIEWS: CPT

## 2021-01-18 PROCEDURE — 93005 ELECTROCARDIOGRAM TRACING: CPT

## 2021-01-19 LAB
ATRIAL RATE: 63 BPM
CALCULATED P AXIS, ECG09: 57 DEGREES
CALCULATED R AXIS, ECG10: 82 DEGREES
CALCULATED T AXIS, ECG11: 63 DEGREES
DIAGNOSIS, 93000: NORMAL
P-R INTERVAL, ECG05: 152 MS
Q-T INTERVAL, ECG07: 358 MS
QRS DURATION, ECG06: 104 MS
QTC CALCULATION (BEZET), ECG08: 366 MS
VENTRICULAR RATE, ECG03: 63 BPM

## 2021-01-20 ENCOUNTER — TELEPHONE (OUTPATIENT)
Dept: FAMILY MEDICINE CLINIC | Age: 61
End: 2021-01-20

## 2021-01-20 NOTE — TELEPHONE ENCOUNTER
Pt called in and informed me that the [facility] office still hasn't received the pre op clearance notes stating that he is cleared for surgery and they need that ASAP as his surgery is on Friday morning. Please advise.

## 2021-02-01 RX ORDER — LANOLIN ALCOHOL/MO/W.PET/CERES
CREAM (GRAM) TOPICAL
Qty: 90 TAB | Refills: 0 | Status: SHIPPED | OUTPATIENT
Start: 2021-02-01 | End: 2022-05-09 | Stop reason: SDUPTHER

## 2021-02-05 DIAGNOSIS — G45.9 TIA (TRANSIENT ISCHEMIC ATTACK): ICD-10-CM

## 2021-02-09 RX ORDER — FOLIC ACID 1 MG/1
TABLET ORAL
Qty: 90 TAB | Refills: 0 | Status: SHIPPED | OUTPATIENT
Start: 2021-02-09 | End: 2021-02-18 | Stop reason: SDUPTHER

## 2021-02-18 ENCOUNTER — TELEPHONE (OUTPATIENT)
Dept: FAMILY MEDICINE CLINIC | Age: 61
End: 2021-02-18

## 2021-02-18 DIAGNOSIS — G45.9 TIA (TRANSIENT ISCHEMIC ATTACK): ICD-10-CM

## 2021-02-18 RX ORDER — FOLIC ACID 1 MG/1
TABLET ORAL
Qty: 90 TAB | Refills: 0 | Status: SHIPPED | OUTPATIENT
Start: 2021-02-18 | End: 2021-05-17 | Stop reason: SDUPTHER

## 2021-02-19 DIAGNOSIS — F41.9 ANXIETY: ICD-10-CM

## 2021-02-19 RX ORDER — LORAZEPAM 1 MG/1
1 TABLET ORAL
Qty: 30 TAB | Refills: 0 | Status: SHIPPED | OUTPATIENT
Start: 2021-02-19 | End: 2021-05-05

## 2021-04-27 DIAGNOSIS — F41.9 ANXIETY: ICD-10-CM

## 2021-05-05 RX ORDER — LORAZEPAM 1 MG/1
TABLET ORAL
Qty: 30 TAB | Refills: 0 | Status: SHIPPED | OUTPATIENT
Start: 2021-05-05 | End: 2021-05-11 | Stop reason: SDUPTHER

## 2021-05-06 ENCOUNTER — TELEPHONE (OUTPATIENT)
Dept: FAMILY MEDICINE CLINIC | Age: 61
End: 2021-05-06

## 2021-05-11 ENCOUNTER — OFFICE VISIT (OUTPATIENT)
Dept: FAMILY MEDICINE CLINIC | Age: 61
End: 2021-05-11
Payer: MEDICARE

## 2021-05-11 VITALS
HEIGHT: 67 IN | RESPIRATION RATE: 16 BRPM | WEIGHT: 177 LBS | SYSTOLIC BLOOD PRESSURE: 140 MMHG | HEART RATE: 78 BPM | OXYGEN SATURATION: 97 % | TEMPERATURE: 98.2 F | BODY MASS INDEX: 27.78 KG/M2 | DIASTOLIC BLOOD PRESSURE: 76 MMHG

## 2021-05-11 DIAGNOSIS — F41.9 ANXIETY: ICD-10-CM

## 2021-05-11 DIAGNOSIS — F17.210 CIGARETTE NICOTINE DEPENDENCE WITHOUT COMPLICATION: ICD-10-CM

## 2021-05-11 DIAGNOSIS — I10 ESSENTIAL HYPERTENSION: ICD-10-CM

## 2021-05-11 DIAGNOSIS — J45.40 MODERATE PERSISTENT ASTHMA WITHOUT COMPLICATION: Primary | ICD-10-CM

## 2021-05-11 PROCEDURE — G8510 SCR DEP NEG, NO PLAN REQD: HCPCS | Performed by: FAMILY MEDICINE

## 2021-05-11 PROCEDURE — 99213 OFFICE O/P EST LOW 20 MIN: CPT | Performed by: FAMILY MEDICINE

## 2021-05-11 PROCEDURE — 3017F COLORECTAL CA SCREEN DOC REV: CPT | Performed by: FAMILY MEDICINE

## 2021-05-11 PROCEDURE — G8419 CALC BMI OUT NRM PARAM NOF/U: HCPCS | Performed by: FAMILY MEDICINE

## 2021-05-11 PROCEDURE — G8754 DIAS BP LESS 90: HCPCS | Performed by: FAMILY MEDICINE

## 2021-05-11 PROCEDURE — G8753 SYS BP > OR = 140: HCPCS | Performed by: FAMILY MEDICINE

## 2021-05-11 PROCEDURE — G8427 DOCREV CUR MEDS BY ELIG CLIN: HCPCS | Performed by: FAMILY MEDICINE

## 2021-05-11 RX ORDER — LORAZEPAM 1 MG/1
TABLET ORAL
Qty: 30 TAB | Refills: 5 | Status: SHIPPED | OUTPATIENT
Start: 2021-05-11 | End: 2021-12-01

## 2021-05-11 NOTE — PROGRESS NOTES
Arjun Sánchez presents today for   Chief Complaint   Patient presents with    Asthma    Hypertension    Anxiety       Is someone accompanying this pt? no    Is the patient using any DME equipment during OV? no    Depression Screening:  3 most recent PHQ Screens 5/11/2021   Little interest or pleasure in doing things Not at all   Feeling down, depressed, irritable, or hopeless Not at all   Total Score PHQ 2 0       Learning Assessment:  Learning Assessment 5/31/2019   PRIMARY LEARNER Patient   HIGHEST LEVEL OF EDUCATION - PRIMARY LEARNER  DID NOT GRADUATE HIGH SCHOOL   BARRIERS PRIMARY LEARNER NONE   CO-LEARNER CAREGIVER No   PRIMARY LANGUAGE ENGLISH   LEARNER PREFERENCE PRIMARY LISTENING   ANSWERED BY patient   RELATIONSHIP SELF       Abuse Screening:  Abuse Screening Questionnaire 1/14/2021   Do you ever feel afraid of your partner? N   Are you in a relationship with someone who physically or mentally threatens you? N   Is it safe for you to go home? Y       Fall Risk  Fall Risk Assessment, last 12 mths 5/31/2019   Able to walk? Yes   Fall in past 12 months? No       Health Maintenance reviewed and discussed and ordered per Provider. Health Maintenance Due   Topic Date Due    COVID-19 Vaccine (1) Never done    Shingrix Vaccine Age 50> (1 of 2) Never done   . Coordination of Care:  1. Have you been to the ER, urgent care clinic since your last visit? Hospitalized since your last visit? no    2. Have you seen or consulted any other health care providers outside of the [address] [place] [address] since your last visit? Include any pap smears or colon screening. no      Last  Checked na  Last UDS Checked na  Last Pain contract signed: na    Patient presents in office today for routine care.   Patient concerns: med refills

## 2021-05-11 NOTE — PROGRESS NOTES
Tracy Kehr is a 57 y.o.  male and presents with    Chief Complaint   Patient presents with    Asthma    Hypertension    Anxiety       Subjective: Anxiety Review:  Patient is seen for anxiety disorder. Current treatment includes Ativan and no other therapies. Ongoing symptoms include: palpitations, paresthesias, insomnia, racing thoughts, psychomotor agitation, feelings of losing control. Patient denies: palpitations, sweating, chest pain, difficulty concentrating. Reported side effects from the treatment: none. Cardiovascular Review:  The patient has hypertension, hyperlipidemia and history of TIA's. Diet and Lifestyle: generally follows a low fat low cholesterol diet, generally follows a low sodium diet, does not rigorously follow a diabetic diet, exercises sporadically, smoker    Home BP Monitoring: is not measured at home. Pertinent ROS: taking medications as instructed, no medication side effects noted, no TIA's, no chest pain on exertion, no dyspnea on exertion, no swelling of ankles. ROS   General ROS: negative for - chills or fever  Ophthalmic ROS: positive for - uses glasses  ENT ROS: negative for - headaches, nasal discharge or sore throat  Endocrine ROS: negative for - polydipsia/polyuria or temperature intolerance  Cardiovascular ROS: no chest pain or dyspnea on exertion  Gastrointestinal ROS: no abdominal pain, change in bowel habits, or black or bloody stools  Genito-Urinary ROS: no dysuria, trouble voiding, or hematuria  Musculoskeletal ROS: positive for - pain in arm - left; 2 1/2 months s/p left rotator cuff repair  Dermatological ROS: negative for - rash or skin lesion change    All other systems reviewed and are negative.     Objective:  Vitals:    05/11/21 1456   BP: (!) 140/76   Pulse: 78   Resp: 16   Temp: 98.2 °F (36.8 °C)   TempSrc: Temporal   SpO2: 97%   Weight: 177 lb (80.3 kg)   Height: 5' 7\" (1.702 m)   PainSc:   0 - No pain     alert, well appearing, and in no distress, oriented to person, place, and time and overweight  Mental status - anxious  Chest - clear to auscultation, no wheezes, rales or rhonchi, symmetric air entry  Heart - normal rate, regular rhythm, normal S1, S2, no murmurs, rubs, clicks or gallops  Neurological - cranial nerves II through XII intact    LABS     TESTS      Assessment/Plan:    1. Anxiety  Continue bzd with caution    2. Moderate persistent asthma without complication  Continue inhaled therapy    3. Essential hypertension  Goal <130/80    4. Cigarette nicotine dependence without complication  Encourage smoking cessation    Lab review: no lab studies available for review at time of visit      I have discussed the diagnosis with the patient and the intended plan as seen in the above orders. The patient has received an after-visit summary and questions were answered concerning future plans. I have discussed medication side effects and warnings with the patient as well. I have reviewed the plan of care with the patient, accepted their input and they are in agreement with the treatment goals.

## 2021-05-17 DIAGNOSIS — G45.9 TIA (TRANSIENT ISCHEMIC ATTACK): ICD-10-CM

## 2021-05-18 RX ORDER — ASPIRIN 325 MG/1
100 TABLET, FILM COATED ORAL DAILY
Qty: 90 TAB | Refills: 3 | Status: SHIPPED | OUTPATIENT
Start: 2021-05-18 | End: 2021-05-20 | Stop reason: SDUPTHER

## 2021-05-18 RX ORDER — FOLIC ACID 1 MG/1
TABLET ORAL
Qty: 90 TAB | Refills: 0 | Status: SHIPPED | OUTPATIENT
Start: 2021-05-18 | End: 2021-09-23 | Stop reason: SDUPTHER

## 2021-05-20 DIAGNOSIS — G45.9 TIA (TRANSIENT ISCHEMIC ATTACK): ICD-10-CM

## 2021-05-20 RX ORDER — ASPIRIN 325 MG/1
100 TABLET, FILM COATED ORAL DAILY
Qty: 90 TABLET | Refills: 3 | Status: SHIPPED | OUTPATIENT
Start: 2021-05-20 | End: 2022-05-09

## 2021-06-04 NOTE — PROGRESS NOTES
Chief Complaint Patient presents with  Tingling  
  hands bilateral  
 Medication Refill  
  aldara cream 5% and tramadol, ativan 1. Have you been to the ER, urgent care clinic since your last visit? Hospitalized since your last visit? No 
 
2. Have you seen or consulted any other health care providers outside of the [address] [place] [address] since your last visit? Include any pap smears or colon screening.  No 
  
 Statement Selected

## 2021-06-17 NOTE — PROGRESS NOTES
HISTORY AND PHYSICAL  Patient: Cynthia Ferreira Date: 6/16/2021   female, 89 year old  Admit Date: 6/16/2021   Attending: No att. providers found    DATE OF SERVICE 6/16/2021  PRIMARY CARE PROVIDER:  DAR Toussaint   ATTENDING PHYSICIAN    No att. providers found  CHIEF COMPLAINT    Pt presents with chest pain, shortness of breath, N/V since this morning- as documented by admitting MD.  HPI    Cynthia Ferreira is a 89 year old female type 2 diabetes mellitus /GERD/hypertension/hyperlipidemia/chronic kidney disease/coronary artery disease status post coronary stents x2/congestive heart failure /hyperparathyroidism /rheumatoid arthritis due anxiety who presents to the emergency room with complaints of chest pressure and shortness of breath associated with nausea and vomiting since this morning.  Patient denies any associated fever chills.  Patient denies any diarrhea bleeding or black stools.  Patient denies any hematemesis.  Patient denies similar symptoms in the family or friends.  Patient denies any precipitating or aggravating events.  Patient denies any exertional chest pain.  Patient had been chest pain-free since arrival to the emergency room.  No history of peptic ulcer disease.No peripheral edema / pnd or othopnea.       Per daughter who stays with pt, Pt has had recurrent bladder retentions int he past. No active luts symptoms.     Good appetite and no recent unintentional wt loss.     ER workup-unremarkable labs with normal white count and control blood sugars.  Stable creatinine.  Unremarkable chest x-ray.  No acute changes on EKG..  Normal troponin x1  CT of the abdomen and pelvis-IMPRESSION: 1.  Complex fat-containing hernia in the LEFT lower abdominal wall. 2.  Diffuse hepatic steatosis. 3.  Distention of the urinary bladder.    PAST MEDICAL & SURGICAL HISTORY    Past Medical History:   Diagnosis Date   • Anemia    • Anxiety    • ARMD (age related macular degeneration) 10/28/2013   • Arthritis  Problem: Falls - Risk of 
Goal: *Absence of Falls Document Logan Levels Fall Risk and appropriate interventions in the flowsheet. Outcome: Progressing Towards Goal 
Fall Risk Interventions: 
  
 
  
 
Medication Interventions: Teach patient to arise slowly    • Attention deficit disorder    • Bronchitis    • CAD (coronary artery disease)    • Cataract    • Chronic pain    • CKD (chronic kidney disease)    • COAG (chronic open-angle glaucoma) 2013   • Congestive cardiac failure (CMS/HCC)    • Depression    • DMII (diabetes mellitus, type 2) (CMS/HCC)    • Fracture of metatarsal     right 5th metatarsal   • GERD (gastroesophageal reflux disease)    • High cholesterol    • HTN (hypertension)    • Hyperparathyroidism (CMS/HCC)    • Osteoporosis, unspecified 2011   • Pneumonia    • Pulmonary nodule     Left, 6 mm   • Rheumatoid arthritis(714.0)    • RLS (restless legs syndrome)    • Stromal cell tumor     S/P resection of small intestion   • Urinary tract infection    • Ventral hernia      Past Surgical History:   Procedure Laterality Date   • Appendectomy     • Cardiac catherization     • Cataract extraction w/ intraocular lens implant Bilateral    • Cholecystectomy     • Colonoscopy diagnostic  2006   • Dexa bone density axial skeleton  2011   • Eye surgery     • Hb closed reduction other Left 10/06/2017    ankle   • Hernia repair     • Mammo screening bilateral  2012   • Removal gallbladder     • Remv cataract extracap insert lens  2012; 05/10/2012    Both eyes   • Skin biopsy     • Small intestine surgery  2002    Resection for GI stromal cell tumor       CURRENT MEDICATIONS- medication list and allergies personally reviewed in [facility]  (Not in a hospital admission)    ALLERGIES  ALLERGIES:   Allergen Reactions   • Ace Inhibitors Other (See Comments)     \"Heart beats faster\"   • Calcium Channel Blockers Cough   • Statins GI UPSET     SOCIAL HISTORY    Social History     Tobacco Use   • Smoking status: Former Smoker     Packs/day: 0.50     Quit date: 1957     Years since quittin.4   • Smokeless tobacco: Never Used   Vaping Use   • Vaping Use: never used   Substance Use Topics   • Alcohol use: Yes     Alcohol/week: 0.0  standard drinks     Comment: 1/month    • Drug use: No     FAMILY HISTORY    Family History   Problem Relation Age of Onset   • Macular degeneration Sister    • Heart disease Sister    • Heart disease Mother    • Diabetes Mother    • Heart disease Father         chf   • Cancer Brother         lung   • High cholesterol Daughter    • High blood pressure Daughter    • Thyroid Daughter    • High blood pressure Son    • High cholesterol Son    • Cancer Brother         lung   • Heart disease Brother    • Heart disease Brother    • Heart disease Brother    • Heart disease Brother    • Heart disease Sister      REVIEW OF SYSTEMS    All 14 Systems were reviewed and found to be negative except what's mentioned in HPI    PHYSICAL EXAMINATION    Vital 24 Hour Range Most Recent Value   Temperature Temp  Min: 97.9 °F (36.6 °C)  Max: 97.9 °F (36.6 °C) 97.9 °F (36.6 °C)   Pulse Pulse  Min: 79  Max: 79 79   Respiratory Resp  Min: 15  Max: 15 15   Blood Pressure BP  Min: 163/73  Max: 163/73 (!) 163/73   Pulse Oximetry SpO2  Min: 98 %  Max: 98 % 98 %   General:  Alert & Oriented X 3 in no acute distress; mood and affect appropriate for situation; good insight; well nourished/well developed/nontoxic  Head/ENT:                Normocephalic/atraumatic; pupils are equal, round and reactive to light & accommodation; oral/nasal mucosa moist and intact without lesions; ; hearing grossly intact  Neck:   Supple; trachea is midline with no cervical or supraclavicular lymphadenopathy; no thyromegaly  Respiratory:  Clear to auscultation bilaterally; no use of accessory muscles; no tactile fremitus  Cardiovascular: Regular rate and rhythm and S1, S2 present; no jugular venous distention; no Carotid Bruit/  Abdomen:  Soft; nontender/nondistended;  + bowel sounds; No guarding or rebound; No peritoneal signs; no hepatosplenomegaly/ mid line ventral hernia with broad base   Musculoskeletal: Good range of motion in all 4 extremities with no clubbing or  cyanosis  Extremities/Skin: Min less than one plus distal bipedal edema /cyanosis absent; warm;  no obvious lesions or wounds noted;    Neurologic:   Cranial Nerves 2-12 Grossly intact as best as patient can cooperate with exam     strength is bilaterally intact; sensation is grossly intact throughout        LABS    Recent Labs   Lab 06/16/21  1744 06/10/21  1721   SODIUM 143 139   POTASSIUM 4.0 3.7   CHLORIDE 102 101   CO2 28 28   BUN 31* 28*   CREATININE 1.14* 1.09*   GLUCOSE 155* 132*   WBC 9.3  --    HGB 12.5  --    HCT 37.7  --      --      Recent Labs   Lab 06/16/21 1744   RAPDTR <0.02     XR Chest AP or PA    Result Date: 6/16/2021  EXAM:  XR CHEST AP OR PA -- 6/16/2021 6:04 PM CLINICAL INDICATION:  Chest pain, SOB COMPARISON:  10/22/2020     FINDINGS/IMPRESSION:  1-view chest radiograph. *  No acute cardiopulmonary process. *  Clear lungs and pleural spaces. *  Stable cardiomediastinal silhouette.  Aortic ectasia/atherosclerotic calcification. *  Severe bilateral shoulder arthropathy.     CT ABDOMEN PELVIS W CONTRAST w/ IV contrast only    Result Date: 6/16/2021  EXAM: CT ABDOMEN PELVIS W CONTRAST HISTORY: Lower abdominal pain, mass.  History of GIST COMPARISON: 3/15/2019 TECHNIQUE:  Multiple helical axial images were obtained of the abdomen and pelvis following the administration of 100 mL Omnipaque 300 intravenous contrast.  Sagittal and coronal reformats. FINDINGS: No dense airspace consolidation.  Coronary artery calcifications.  Mitral annulus calcifications. Diffusely decreased hepatic attenuation.  Cholecystectomy clips.  Spleen, adrenal glands and pancreas are unremarkable. Symmetric uniform renal enhancement.  No hydronephrosis.  No cortical mass.  The tortuous aorta. Stomach, large and small bowel are normal in caliber.  There is no evidence for obstruction.  Minimal diverticulosis.  No definite acute diverticulitis. Postoperative changes of a ventral hernia repair identified.  There is a  fat-containing LEFT lower abdominal wall (series 2/117). No definitive adenopathy. The urinary bladder is within normal limits.  There is no adnexal mass. Urinary bladder is dilated, measuring 15 cm in length. Degenerative changes are seen throughout the spine.  Mild L4 compression fracture, similar to prior.  No destructive bone lesions.     IMPRESSION: 1.  Complex fat-containing hernia in the LEFT lower abdominal wall. 2.  Diffuse hepatic steatosis. 3.  Distention of the urinary bladder.    Echo 7/2018    Normal left ventricular systolic function.  No regional wall motion abnormalities.  Mild left ventricular hypertrophy.  Left ventricular ejection fraction, 65 %, last meaasured at 61%.      Latest Hb a1c - 7      EKG- nsr/ pac/ no acute st t changes     CODE STATUS- Prior    ASSESSMENT & PLAN:    · Atypical chest pain in a high risk pt with established CAD- cp free at this time/ serial enzymes and ekg. Stress hillary in am . D dimer wnl. No risk factors for pe.     · Abdominal pain - non revealing exam other than acute urianary retention and no clinical evidence of non reducible hernia.normal wbc. Lactic acid pending.     · IRDM- latest a1c 7. Consistent carb liquid diet that will be advanced as tolerated. Sliding scale of insulin therapy with reduce basal insulin to 50 units perday .     · Acute urinary retention - Colon/ will make sure pt is not impacted. No focal neuro exam. UA negative.     · CHF due to ischemic CM- clinically compensated and euvolemic.    · CKD stage 3 - avoid nephrotoxic drugs and interventions.     · DVT Prophylaxis -enoxaparin       · Disposition- admit as observation.         Please see H&P and MD Progress Notes for additional information about the patient's course of treatment.    Kavin Dietrich MD  6/16/2021  7:44 PM

## 2021-08-13 DIAGNOSIS — G45.9 TIA (TRANSIENT ISCHEMIC ATTACK): ICD-10-CM

## 2021-08-14 RX ORDER — ATORVASTATIN CALCIUM 80 MG/1
TABLET, FILM COATED ORAL
Qty: 90 TABLET | Refills: 0 | Status: SHIPPED | OUTPATIENT
Start: 2021-08-14 | End: 2021-11-17

## 2021-09-23 DIAGNOSIS — G45.9 TIA (TRANSIENT ISCHEMIC ATTACK): ICD-10-CM

## 2021-09-23 NOTE — TELEPHONE ENCOUNTER
Patient called requesting medication refill, please advise    Requested Prescriptions     Pending Prescriptions Disp Refills    folic acid (FOLVITE) 1 mg tablet 90 Tablet 0     Sig: Take 1 tablet by mouth once daily

## 2021-09-24 RX ORDER — FOLIC ACID 1 MG/1
TABLET ORAL
Qty: 90 TABLET | Refills: 0 | Status: SHIPPED | OUTPATIENT
Start: 2021-09-24 | End: 2021-11-26 | Stop reason: SDUPTHER

## 2021-11-26 DIAGNOSIS — G45.9 TIA (TRANSIENT ISCHEMIC ATTACK): ICD-10-CM

## 2021-11-26 DIAGNOSIS — J45.40 MODERATE PERSISTENT ASTHMA WITHOUT COMPLICATION: ICD-10-CM

## 2021-11-26 RX ORDER — ALBUTEROL SULFATE 90 UG/1
AEROSOL, METERED RESPIRATORY (INHALATION)
Qty: 2 EACH | Refills: 2 | Status: SHIPPED | OUTPATIENT
Start: 2021-11-26

## 2021-11-26 RX ORDER — FOLIC ACID 1 MG/1
TABLET ORAL
Qty: 90 TABLET | Refills: 0 | Status: SHIPPED | OUTPATIENT
Start: 2021-11-26 | End: 2021-12-28

## 2021-11-26 NOTE — TELEPHONE ENCOUNTER
Patient is requesting the following medications:    Requested Prescriptions     Pending Prescriptions Disp Refills    Ventolin HFA 90 mcg/actuation inhaler       Sig: INHALE 2 PUFFS BY MOUTH EVERY 6 HOURS AS NEEDED FOR WHEEZING AND FOR SHORTNESS OF BREATH    folic acid (FOLVITE) 1 mg tablet 90 Tablet 0     Sig: Take 1 tablet by mouth once daily

## 2021-12-24 DIAGNOSIS — G45.9 TIA (TRANSIENT ISCHEMIC ATTACK): ICD-10-CM

## 2021-12-28 RX ORDER — FOLIC ACID 1 MG/1
TABLET ORAL
Qty: 90 TABLET | Refills: 0 | Status: SHIPPED | OUTPATIENT
Start: 2021-12-28 | End: 2022-03-25

## 2022-03-18 PROBLEM — E78.5 HYPERLIPIDEMIA: Status: ACTIVE | Noted: 2018-11-20

## 2022-03-18 PROBLEM — Z71.89 ADVANCE CARE PLANNING: Status: ACTIVE | Noted: 2017-01-26

## 2022-03-19 PROBLEM — I10 ESSENTIAL HYPERTENSION: Status: ACTIVE | Noted: 2018-09-07

## 2022-03-19 PROBLEM — G45.9 TIA (TRANSIENT ISCHEMIC ATTACK): Status: ACTIVE | Noted: 2018-11-19

## 2022-03-19 PROBLEM — Z96.641 STATUS POST RIGHT HIP REPLACEMENT: Status: ACTIVE | Noted: 2018-09-07

## 2022-03-24 DIAGNOSIS — G45.9 TIA (TRANSIENT ISCHEMIC ATTACK): ICD-10-CM

## 2022-03-25 RX ORDER — FOLIC ACID 1 MG/1
TABLET ORAL
Qty: 90 TABLET | Refills: 0 | Status: SHIPPED | OUTPATIENT
Start: 2022-03-25 | End: 2022-07-11 | Stop reason: SDUPTHER

## 2022-05-08 DIAGNOSIS — G45.9 TIA (TRANSIENT ISCHEMIC ATTACK): ICD-10-CM

## 2022-05-09 RX ORDER — LANOLIN ALCOHOL/MO/W.PET/CERES
CREAM (GRAM) TOPICAL
Qty: 90 TABLET | Refills: 0 | Status: SHIPPED | OUTPATIENT
Start: 2022-05-09 | End: 2022-07-11 | Stop reason: SDUPTHER

## 2022-05-10 ENCOUNTER — OFFICE VISIT (OUTPATIENT)
Dept: FAMILY MEDICINE CLINIC | Age: 62
End: 2022-05-10
Payer: COMMERCIAL

## 2022-05-10 VITALS
HEART RATE: 62 BPM | BODY MASS INDEX: 27.47 KG/M2 | DIASTOLIC BLOOD PRESSURE: 80 MMHG | SYSTOLIC BLOOD PRESSURE: 138 MMHG | HEIGHT: 67 IN | RESPIRATION RATE: 16 BRPM | TEMPERATURE: 98.4 F | WEIGHT: 175 LBS | OXYGEN SATURATION: 98 %

## 2022-05-10 DIAGNOSIS — Z00.00 MEDICARE ANNUAL WELLNESS VISIT, SUBSEQUENT: ICD-10-CM

## 2022-05-10 DIAGNOSIS — M50.30 DDD (DEGENERATIVE DISC DISEASE), CERVICAL: ICD-10-CM

## 2022-05-10 DIAGNOSIS — R26.89 BALANCE PROBLEM: ICD-10-CM

## 2022-05-10 DIAGNOSIS — Z13.6 ENCOUNTER FOR LIPID SCREENING FOR CARDIOVASCULAR DISEASE: ICD-10-CM

## 2022-05-10 DIAGNOSIS — Z71.89 ADVANCE CARE PLANNING: ICD-10-CM

## 2022-05-10 DIAGNOSIS — F41.9 ANXIETY: ICD-10-CM

## 2022-05-10 DIAGNOSIS — J45.40 MODERATE PERSISTENT ASTHMA WITHOUT COMPLICATION: ICD-10-CM

## 2022-05-10 DIAGNOSIS — Z13.220 ENCOUNTER FOR LIPID SCREENING FOR CARDIOVASCULAR DISEASE: ICD-10-CM

## 2022-05-10 DIAGNOSIS — I10 ESSENTIAL HYPERTENSION: Primary | ICD-10-CM

## 2022-05-10 DIAGNOSIS — H61.21 HEARING LOSS OF RIGHT EAR DUE TO CERUMEN IMPACTION: ICD-10-CM

## 2022-05-10 DIAGNOSIS — F17.210 CIGARETTE NICOTINE DEPENDENCE WITHOUT COMPLICATION: ICD-10-CM

## 2022-05-10 PROCEDURE — G0439 PPPS, SUBSEQ VISIT: HCPCS | Performed by: FAMILY MEDICINE

## 2022-05-10 PROCEDURE — 99406 BEHAV CHNG SMOKING 3-10 MIN: CPT | Performed by: FAMILY MEDICINE

## 2022-05-10 PROCEDURE — 99214 OFFICE O/P EST MOD 30 MIN: CPT | Performed by: FAMILY MEDICINE

## 2022-05-10 PROCEDURE — 69210 REMOVE IMPACTED EAR WAX UNI: CPT | Performed by: FAMILY MEDICINE

## 2022-05-10 PROCEDURE — 99497 ADVNCD CARE PLAN 30 MIN: CPT | Performed by: FAMILY MEDICINE

## 2022-05-10 RX ORDER — LORAZEPAM 1 MG/1
TABLET ORAL
Qty: 30 TABLET | Refills: 5 | Status: SHIPPED | OUTPATIENT
Start: 2022-05-10

## 2022-05-10 NOTE — PROGRESS NOTES
(AWV) The Medicare Annual Wellness Exam PROGRESS NOTE    This is a Medicare Annual Wellness Exam (AWV)    I have reviewed the patient's medical history in detail and updated the computerized patient record. Luis Evans is a 60 y.o.  male and presents for an annual wellness exam       ROS   General ROS: negative for - chills or fever  Ophthalmic ROS: positive for - uses glasses  ENT ROS: negative for - headaches, nasal discharge or sore throat  Endocrine ROS: negative for - polydipsia/polyuria or temperature intolerance  Cardiovascular ROS: no chest pain or dyspnea on exertion  Gastrointestinal ROS: no abdominal pain, change in bowel habits, or black or bloody stools  Genito-Urinary ROS: no dysuria, trouble voiding, or hematuria  Musculoskeletal ROS: positive for - pain in arm - left; 2 1/2 months s/p left rotator cuff repair  Dermatological ROS: negative for - rash or skin lesion change  All other systems reviewed and are negative.     History     Past Medical History:   Diagnosis Date    Anxiety     Arthritis     osteo-hips    Asthma     as a child only    Bilateral primary osteoarthritis of hip 7/14/2016    Broken rib april 2013    3 broken ribs s/p fall with hemothorax    Chronic pain     hips, legs and knees    Hypertension     Psychiatric disorder     anxiety      Past Surgical History:   Procedure Laterality Date    HX HERNIA REPAIR Left     HX LUMBAR DISKECTOMY      HX MOHS PROCEDURES      (R) rotator cuff     Current Outpatient Medications   Medication Sig Dispense Refill    thiamine HCL (B-1) 100 mg tablet Take 1 tablet by mouth once daily 90 Tablet 0    folic acid (FOLVITE) 1 mg tablet Take 1 tablet by mouth once daily 90 Tablet 0    amLODIPine (NORVASC) 5 mg tablet Take 1 tablet by mouth once daily 90 Tablet 3    LORazepam (ATIVAN) 1 mg tablet TAKE 1 TABLET BY MOUTH EVERY 8 HOURS AS NEEDED FOR ANXIETY 30 Tablet 5    Ventolin HFA 90 mcg/actuation inhaler INHALE 2 PUFFS BY MOUTH EVERY 6 HOURS AS NEEDED FOR WHEEZING AND FOR SHORTNESS OF BREATH 2 Each 2    atorvastatin (LIPITOR) 80 mg tablet Take 1 tablet by mouth nightly 90 Tablet 3    cyclobenzaprine (FLEXERIL) 10 mg tablet Take 1 Tab by mouth three (3) times daily as needed for Muscle Spasm(s). 90 Tab 1    clobetasoL (TEMOVATE) 0.05 % ointment Apply  to affected area two (2) times a day. 60 g 0    clobetasol (TEMOVATE) 0.05 % ointment Apply  to affected area two (2) times a day. 60 g 0    acetaminophen (TYLENOL EXTRA STRENGTH) 500 mg tablet Take 2 Tabs by mouth every six (6) hours as needed for Pain. 40 Tab 0    cetirizine (ZYRTEC) 10 mg tablet Take 1 Tab by mouth daily. 30 Tab 2    lidocaine (LIDODERM) 5 % Apply patch to the affected area for 12 hours a day and remove for 12 hours a day. 30 Each 0    aspirin (ASPIRIN) 325 mg tablet Take 1 Tab by mouth daily.  27 Tab 1     No Known Allergies  Family History   Problem Relation Age of Onset    Dementia Mother     Cancer Father 61     Social History     Tobacco Use    Smoking status: Current Every Day Smoker     Packs/day: 0.25     Years: 30.00     Pack years: 7.50    Smokeless tobacco: Never Used    Tobacco comment: pt will try to quit smoking   Substance Use Topics    Alcohol use: No     Alcohol/week: 0.0 standard drinks     Comment: quit 1/2016     Patient Active Problem List   Diagnosis Code    Asthma J45.909    Tobacco abuse Z72.0    Chronic pain G89.29    Anxiety F41.9    Bilateral primary osteoarthritis of hip M16.0    Advance care planning Z71.89    Essential hypertension I10    Status post right hip replacement Z96.641    TIA (transient ischemic attack) G45.9    Hyperlipidemia E78.5       Health Maintenance History  Immunizations reviewed, dtap up to date , pneumovax due, flu up to date, zoster refused  Colonoscopy: up to date,   Eye exam: due  covid vaccine refused        Depression Risk Factor Screening:      Patient Health Questionnaire (PHQ-2) Over the last 2 weeks, how often have you been bothered by any of the following problems? · Little interest or pleasure in doing things? · Not at all. [0]  · Feeling down, depressed, or hopeless? · Not at all. [0]    Total Score: 0/6  PHQ-2 Assessment Scoring:   A score of 2 or more requires further screening with the PHQ-9    Alcohol Risk Factor Screening:     Men: On any occasion during the past 3 months, have you had more than 4 drinks containing alcohol? no  Do you average more than 14 drinks per week? no    Functional Ability and Level of Safety:     Hearing Loss    Hearing is good. Activities of Daily Living   Self-care. Requires assistance with: no ADLs    Fall Risk   Secondary diagnoses (15 pts)  Score: 15    Abuse Screen   Patient is not abused    Examination   Physical Examination  Vitals:    05/10/22 1448   BP: 138/80   Pulse: 62   Resp: 16   Temp: 98.4 °F (36.9 °C)   TempSrc: Temporal   SpO2: 98%   Weight: 175 lb (79.4 kg)   Height: 5' 7\" (1.702 m)   PainSc:   0 - No pain      Body mass index is 27.41 kg/m².      Evaluation of Cognitive Function:  Mood/affect:good mood with appropriate affect  Appearance: well kempt  Family member/caregiver input: n/a    alert, well appearing, and in no distress, oriented to person, place, and time and overweight    Patient Care Team:  Luc Main MD as PCP - General (Family Medicine)  Luc Main MD as PCP - [facility] Empaneled Provider  Fee, Tarun Trinidad MD (Family Medicine)  Alfredo Delatorre MD (Surgery Physician)    End-of-life planning  Advanced Directive in the case than an injury or illness causes the patient to be unable to make health care decisions    Health Care Directive or Living Will: yes    Advice/Referrals/Counselling/Plan:   Education and counseling provided:  End-of-Life planning (with patient's consent)  Diabetes screening test  Include in education list (weight loss, physical activity, smoking cessation, fall prevention, and nutrition) ICD-10-CM ICD-9-CM    1. Essential hypertension  I10 401.9    2. Moderate persistent asthma without complication  L82.27 628.23    3. Cigarette nicotine dependence without complication  A35.749 772.9    4. Medicare annual wellness visit, subsequent  Z00.00 V70.0    5. Advance care planning  Z71.89 V65.49 ADVANCE CARE PLANNING FIRST 30 MINS   6. DDD (degenerative disc disease), cervical  M50.30 722.4    7. Encounter for lipid screening for cardiovascular disease  Z13.220 V77.91 LIPID PANEL    Z13.6 V81.2    8. Balance problem  R26.89 781.99    9. Anxiety  F41.9 300.00 LORazepam (ATIVAN) 1 mg tablet   10. Hearing loss of right ear due to cerumen impaction  H61.21 389.8 REMOVE IMPACTED EAR WAX     380.4    . Brief written plan, checklist    I have discussed the diagnosis with the patient and the intended plan as seen in the above orders. The patient has received an after-visit summary and questions were answered concerning future plans. I have discussed medication side effects and warnings with the patient as well. I have reviewed the plan of care with the patient, accepted their input and they are in agreement with the treatment goals. ____________________________________________________________    Problem Assessment    for treatment of   Chief Complaint   Patient presents with    Annual Wellness Visit         SUBJECTIVE    Well Adult Physical   Patient here for a comprehensive physical exam.The patient reports problems - he has had difficulty with equilibrium  Do you take any herbs or supplements that were not prescribed by a doctor? no Are you taking calcium supplements? no Are you taking aspirin daily? not applicable  Anxiety Review:  Patient is seen for anxiety disorder. Current treatment includes Ativan and no other therapies. Ongoing symptoms include: palpitations, paresthesias, insomnia, racing thoughts, psychomotor agitation, feelings of losing control.    Patient denies: palpitations, sweating, chest pain, difficulty concentrating. Reported side effects from the treatment: none. Cardiovascular Review:  The patient has hypertension, hyperlipidemia and history of TIA's. Diet and Lifestyle: generally follows a low fat low cholesterol diet, generally follows a low sodium diet, does not rigorously follow a diabetic diet, exercises sporadically, smoker    Home BP Monitoring: is not measured at home. Pertinent ROS: taking medications as instructed, no medication side effects noted, no TIA's, no chest pain on exertion, no dyspnea on exertion, no swelling of ankles.       Visit Vitals  /80 (BP 1 Location: Right arm, BP Patient Position: Sitting, BP Cuff Size: Adult)   Pulse 62   Temp 98.4 °F (36.9 °C) (Temporal)   Resp 16   Ht 5' 7\" (1.702 m)   Wt 175 lb (79.4 kg)   SpO2 98%   BMI 27.41 kg/m²     General:  Alert, cooperative, no distress, appears stated age. Head:  Normocephalic, without obvious abnormality, atraumatic. Eyes:  Conjunctivae/corneas clear. PERRL, EOMs intact. Ears:  Right ear cerumen impaction; left TM normal; cerumen removed with irrigation and instrumentation   Nose: Nares normal. Septum midline. Mucosa normal. No drainage or sinus tenderness. Throat: Lips, mucosa, and tongue normal. Teeth and gums normal.   Neck: Supple, symmetrical, trachea midline, no adenopathy, thyroid: no enlargement/tenderness/nodules and no JVD. Back:   Symmetric, no curvature. ROM normal. No CVA tenderness. Lungs:   Clear to auscultation bilaterally. Chest wall:  No tenderness or deformity. Heart:  Regular rate and rhythm, S1, S2 normal, no murmur, click, rub or gallop. Abdomen:   Soft, non-tender. Bowel sounds normal. No masses,  No organomegaly. Genitalia:  deferred   Rectal:  deferred   Extremities: Extremities normal, atraumatic, no cyanosis or edema. Pulses: 2+ and symmetric all extremities.    Skin: Skin color, texture, turgor normal. No rashes or lesions   Lymph nodes: Cervical, supraclavicular, and axillary nodes normal.   Neurologic: CNII-XII intact. Normal strength, sensation and reflexes throughout. Negative romberg     LABS     TESTS    Assessment/Plan:    1. Essential hypertension  Goal <130/80; borderline controlled; encourage smoking cessation and low salt diet    2. Moderate persistent asthma without complication  Continue inhaled therapy    3. Cigarette nicotine dependence without complication  Encourage smoking cessation; counseled on smoking cessation for >  3 minutes     4. Medicare annual wellness visit, subsequent  Reviewed preventive recommendations    5. Advance care planning  See ACP  - ADVANCE CARE PLANNING FIRST 30 MINS    6. DDD (degenerative disc disease), cervical  Continue range of motion and strengthening exercises    7. Encounter for lipid screening for cardiovascular disease    - LIPID PANEL; Future    8. Balance problem  Improved with ear wax removed    9. Anxiety  Continue bzd with caution  - LORazepam (ATIVAN) 1 mg tablet; TAKE 1 TABLET BY MOUTH EVERY 8 HOURS AS NEEDED FOR ANXIETY  Dispense: 30 Tablet; Refill: 5    10.  Hearing loss of right ear due to cerumen impaction    - REMOVE IMPACTED EAR WAX    Lab review: orders written for new lab studies as appropriate; see orders

## 2022-05-10 NOTE — ACP (ADVANCE CARE PLANNING)
Advance Care Planning     Advance Care Planning (ACP) Physician/NP/PA Conversation      Date of Conversation: 5/10/2022  Conducted with: Patient with Decision Making Capacity    Healthcare Decision Maker:     Primary Decision Maker: Nohemi Chapin [phone]  Click here to complete [address] including selection of the Healthcare Decision Maker Relationship (ie \"Primary\")        Today we documented Decision Maker(s) consistent with Legal Next of Kin hierarchy. Care Preferences:    Hospitalization: \"If your health worsens and it becomes clear that your chance of recovery is unlikely, what would be your preference regarding hospitalization? \"  The patient would prefer hospitalization. Ventilation: \"If you were unable to breathe on your own and your chance of recovery was unlikely, what would be your preference about the use of a ventilator (breathing machine) if it was available to you? \"   The patient would desire the use of a ventilator. Resuscitation: \"In the event your heart stopped as a result of an underlying serious health condition, would you want attempts to be made to restart your heart, or would you prefer a natural death? \"   Yes, attempt to resuscitate.     Additional topics discussed: treatment goals, benefit/burden of treatment options, ventilation preferences, hospitalization preferences and resuscitation preferences    Conversation Outcomes / Follow-Up Plan:   ACP in process - information provided, considering goals and options  Reviewed DNR/DNI and patient elects Full Code (Attempt Resuscitation)     Length of Voluntary ACP Conversation in minutes:  16 minutes    Angel Lockett MD

## 2022-05-10 NOTE — PROGRESS NOTES
Darnell Moser presents today for   Chief Complaint   Patient presents with    Annual Wellness Visit       Is someone accompanying this pt? no    Is the patient using any DME equipment during OV? no    Depression Screening:  3 most recent PHQ Screens 5/10/2022   Little interest or pleasure in doing things Not at all   Feeling down, depressed, irritable, or hopeless Not at all   Total Score PHQ 2 0       Learning Assessment:  Learning Assessment 5/31/2019   PRIMARY LEARNER Patient   HIGHEST LEVEL OF EDUCATION - PRIMARY LEARNER  DID NOT GRADUATE [facility] PRIMARY LEARNER NONE   CO-LEARNER CAREGIVER No   PRIMARY LANGUAGE ENGLISH   LEARNER PREFERENCE PRIMARY LISTENING   ANSWERED BY patient   RELATIONSHIP SELF       Abuse Screening:  Abuse Screening Questionnaire 1/14/2021   Do you ever feel afraid of your partner? N   Are you in a relationship with someone who physically or mentally threatens you? N   Is it safe for you to go home? Y       Fall Risk  Fall Risk Assessment, last 12 mths 5/31/2019   Able to walk? Yes   Fall in past 12 months? No       Health Maintenance reviewed and discussed and ordered per Provider. Health Maintenance Due   Topic Date Due    COVID-19 Vaccine (1) Never done    Shingrix Vaccine Age 50> (1 of 2) Never done    Pneumococcal 0-64 years (2 - PCV) 02/05/2017    Lipid Screen  01/14/2022    Medicare Yearly Exam  01/15/2022    Depression Screen  05/11/2022   . Coordination of Care:  1. Have you been to the ER, urgent care clinic since your last visit? Hospitalized since your last visit? Yes, 5/8/22, [facility], pulled muscle left shoulder. 2. Have you seen or consulted any other health care providers outside of the [address] [place] [address] since your last visit? Include any pap smears or colon screening.  no      Last  Checked na  Last UDS Checked na  Last Pain contract signed: na    Annual Medicare Wellness Exam  Med refills, and feet swollen

## 2022-07-11 ENCOUNTER — OFFICE VISIT (OUTPATIENT)
Dept: FAMILY MEDICINE CLINIC | Age: 62
End: 2022-07-11
Payer: COMMERCIAL

## 2022-07-11 VITALS
RESPIRATION RATE: 17 BRPM | WEIGHT: 175.4 LBS | BODY MASS INDEX: 27.53 KG/M2 | HEIGHT: 67 IN | SYSTOLIC BLOOD PRESSURE: 144 MMHG | DIASTOLIC BLOOD PRESSURE: 78 MMHG | OXYGEN SATURATION: 97 % | TEMPERATURE: 97.6 F | HEART RATE: 68 BPM

## 2022-07-11 DIAGNOSIS — G45.9 TIA (TRANSIENT ISCHEMIC ATTACK): ICD-10-CM
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
## 2022-07-11 DIAGNOSIS — J45.40 MODERATE PERSISTENT ASTHMA WITHOUT COMPLICATION: ICD-10-CM

## 2022-07-11 DIAGNOSIS — S80.861A INSECT BITE OF RIGHT LOWER LEG, INITIAL ENCOUNTER: Primary | ICD-10-CM

## 2022-07-11 DIAGNOSIS — F17.210 CIGARETTE NICOTINE DEPENDENCE WITHOUT COMPLICATION: ICD-10-CM

## 2022-07-11 DIAGNOSIS — I10 ESSENTIAL HYPERTENSION: ICD-10-CM

## 2022-07-11 DIAGNOSIS — D75.89 MACROCYTOSIS: ICD-10-CM

## 2022-07-11 DIAGNOSIS — L30.1 DYSHIDROTIC ECZEMA: ICD-10-CM

## 2022-07-11 PROCEDURE — 99214 OFFICE O/P EST MOD 30 MIN: CPT | Performed by: FAMILY MEDICINE

## 2022-07-11 RX ORDER — FOLIC ACID 1 MG/1
1 TABLET ORAL DAILY
Qty: 90 TABLET | Refills: 0 | Status: SHIPPED | OUTPATIENT
Start: 2022-07-11 | End: 2022-10-11 | Stop reason: SDUPTHER

## 2022-07-11 RX ORDER — CLOBETASOL PROPIONATE 0.5 MG/G
OINTMENT TOPICAL 2 TIMES DAILY
Qty: 60 G | Refills: 0 | Status: SHIPPED | OUTPATIENT
Start: 2022-07-11

## 2022-07-11 RX ORDER — LANOLIN ALCOHOL/MO/W.PET/CERES
100 CREAM (GRAM) TOPICAL DAILY
Qty: 90 TABLET | Refills: 1 | Status: SHIPPED | OUTPATIENT
Start: 2022-07-11 | End: 2022-08-15 | Stop reason: SDUPTHER

## 2022-07-11 RX ORDER — HYDROCHLOROTHIAZIDE 25 MG/1
25 TABLET ORAL DAILY
Qty: 30 TABLET | Refills: 11 | Status: SHIPPED | OUTPATIENT
Start: 2022-07-11

## 2022-07-11 NOTE — PROGRESS NOTES
Mirian Morgan is a 60 y.o.  male and presents with    Chief Complaint   Patient presents with    Insect Bite    Ankle swelling       Subjective:  He is following up after insect bites in lower legs with swelling. He has purchased compression stockings for symptoms. Anxiety Review:  Patient is seen for anxiety disorder. Current treatment includes Ativan and no other therapies. Ongoing symptoms include: palpitations, paresthesias, insomnia, racing thoughts, psychomotor agitation, feelings of losing control. Patient denies: palpitations, sweating, chest pain, difficulty concentrating. Reported side effects from the treatment: none. Cardiovascular Review:  The patient has hypertension, hyperlipidemia and history of TIA's. Diet and Lifestyle: generally follows a low fat low cholesterol diet, generally follows a low sodium diet, does not rigorously follow a diabetic diet, exercises sporadically, smoker    Home BP Monitoring: is not measured at home. Pertinent ROS: taking medications as instructed, no medication side effects noted, no TIA's, no chest pain on exertion, no dyspnea on exertion, no swelling of ankles.      ROS   General ROS: negative for - chills or fever  Ophthalmic ROS: positive for - uses glasses  ENT ROS: negative for - headaches, nasal discharge or sore throat  Endocrine ROS: negative for - polydipsia/polyuria or temperature intolerance  Cardiovascular ROS: no chest pain or dyspnea on exertion  Gastrointestinal ROS: no abdominal pain, change in bowel habits, or black or bloody stools  Genito-Urinary ROS: no dysuria, trouble voiding, or hematuria  Musculoskeletal ROS: positive for - improved pain in arm   Dermatological ROS: negative for - rash or skin lesion change  All other systems reviewed and are negative.     Objective:  Vitals:    07/11/22 1433 07/11/22 1435   BP: (!) 144/69 (!) 144/78   Pulse: 68    Resp: 17    Temp: 97.6 °F (36.4 °C)    TempSrc: Temporal    SpO2: 97% Weight: 175 lb 6.4 oz (79.6 kg)    Height: 5' 7\" (1.702 m)        alert, well appearing, and in no distress, oriented to person, place, and time and overweight  Mental status - normal mood, behavior, speech, dress, motor activity, and thought processes  Chest - clear to auscultation, no wheezes, rales or rhonchi, symmetric air entry  Heart - normal rate, regular rhythm, normal S1, S2, no murmurs, rubs, clicks or gallops  Neurological - cranial nerves II through XII intact  Extremities - pedal edema 1 +  Skin  - erythematous papule on lower legs    LABS     TESTS      Assessment/Plan:    1. Dyshidrotic eczema  Continue topical corticosteroid  - clobetasoL (TEMOVATE) 0.05 % ointment; Apply  to affected area two (2) times a day. Dispense: 60 g; Refill: 0    2. Insect bite of right lower leg, initial encounter  Keep clean and dry; edema associated with bites  - clobetasoL (TEMOVATE) 0.05 % ointment; Apply  to affected area two (2) times a day. Dispense: 60 g; Refill: 0    3. Essential hypertension  Goal <130/80; not at goal; start hctz with ccb for better results  - hydroCHLOROthiazide (HYDRODIURIL) 25 mg tablet; Take 1 Tablet by mouth daily. Dispense: 30 Tablet; Refill: 11    4. Cigarette nicotine dependence without complication  Encourage smoking cessation    5. Moderate persistent asthma without complication  Continue inhaled therapy      Lab review: no lab studies available for review at time of visit      I have discussed the diagnosis with the patient and the intended plan as seen in the above orders. The patient has received an after-visit summary and questions were answered concerning future plans. I have discussed medication side effects and warnings with the patient as well. I have reviewed the plan of care with the patient, accepted their input and they are in agreement with the treatment goals.

## 2022-07-11 NOTE — PROGRESS NOTES
Herminio Park is a 60 y.o. male (: 1960) presenting to address:    Chief Complaint   Patient presents with    Medication Refill       There were no vitals filed for this visit. Hearing/Vision:   No exam data present    Learning Assessment:     Learning Assessment 2019   PRIMARY LEARNER Patient   HIGHEST LEVEL OF EDUCATION - PRIMARY LEARNER  DID NOT GRADUATE HIGH SCHOOL   BARRIERS PRIMARY LEARNER NONE   CO-LEARNER CAREGIVER No   PRIMARY LANGUAGE ENGLISH   LEARNER PREFERENCE PRIMARY LISTENING   ANSWERED BY patient   RELATIONSHIP SELF     Depression Screening:     3 most recent PHQ Screens 2022   Little interest or pleasure in doing things Not at all   Feeling down, depressed, irritable, or hopeless Not at all   Total Score PHQ 2 0     Fall Risk Assessment:     Fall Risk Assessment, last 12 mths 2019   Able to walk? Yes   Fall in past 12 months? No     Abuse Screening:     Abuse Screening Questionnaire 2021   Do you ever feel afraid of your partner? N   Are you in a relationship with someone who physically or mentally threatens you? N   Is it safe for you to go home? Y     ADL Assessment:   No flowsheet data found. Coordination of Care Questionaire:     1. \"Have you been to the ER, urgent care clinic since your last visit? Hospitalized since your last visit? \" No    2. \"Have you seen or consulted any other health care providers outside of the [address] [place] [address] since your last visit? \" No     3. For patients aged 39-70: Has the patient had a colonoscopy / FIT/ Cologuard? Yes - no Care Gap present      If the patient is female:    4. For patients aged 41-77: Has the patient had a mammogram within the past 2 years? NA - based on age or sex      11. For patients aged 21-65: Has the patient had a pap smear?  NA - based on age or sexno covid shots

## 2022-08-15 DIAGNOSIS — D75.89 MACROCYTOSIS: ICD-10-CM

## 2022-08-15 DIAGNOSIS — G45.9 TIA (TRANSIENT ISCHEMIC ATTACK): ICD-10-CM

## 2022-08-15 NOTE — TELEPHONE ENCOUNTER
This patient contacted the office for the following prescriptions to be refilled:    Medication requested :   Requested Prescriptions     Pending Prescriptions Disp Refills    thiamine HCL (B-1) 100 mg tablet 90 Tablet 1     Sig: Take 1 Tablet by mouth in the morning. PCP: Madyson Johnson MD  LOV: 7/11/2022 NOV DMA: Visit date not found  FUTURE APPT: No future appointments. Thank you.

## 2022-08-17 RX ORDER — LANOLIN ALCOHOL/MO/W.PET/CERES
100 CREAM (GRAM) TOPICAL DAILY
Qty: 90 TABLET | Refills: 1 | Status: SHIPPED | OUTPATIENT
Start: 2022-08-17

## 2022-10-11 DIAGNOSIS — D75.89 MACROCYTOSIS: ICD-10-CM

## 2022-10-11 DIAGNOSIS — G45.9 TIA (TRANSIENT ISCHEMIC ATTACK): ICD-10-CM

## 2022-10-11 NOTE — TELEPHONE ENCOUNTER
This patient contacted the office for the following prescriptions to be refilled:    Medication requested :   Requested Prescriptions     Pending Prescriptions Disp Refills    folic acid (FOLVITE) 1 mg tablet 90 Tablet 0     Sig: Take 1 Tablet by mouth daily. *Pt is requesting a 90-day supply, if possible. Please call pt to confirm. PCP: Jerardo Cordero MD  LOV: 7/11/2022 NOV DMA: Visit date not found  FUTURE APPT: No future appointments. Thank you.

## 2022-10-17 RX ORDER — FOLIC ACID 1 MG/1
1 TABLET ORAL DAILY
Qty: 90 TABLET | Refills: 0 | Status: SHIPPED | OUTPATIENT
Start: 2022-10-17

## 2022-11-12 DIAGNOSIS — F41.9 ANXIETY: ICD-10-CM

## 2022-11-15 RX ORDER — LORAZEPAM 1 MG/1
TABLET ORAL
Qty: 30 TABLET | Refills: 0 | Status: SHIPPED | OUTPATIENT
Start: 2022-11-15

## 2022-11-29 DIAGNOSIS — J45.40 MODERATE PERSISTENT ASTHMA WITHOUT COMPLICATION: ICD-10-CM

## 2022-12-02 RX ORDER — ALBUTEROL SULFATE 90 UG/1
AEROSOL, METERED RESPIRATORY (INHALATION)
Qty: 36 G | Refills: 0 | Status: SHIPPED | OUTPATIENT
Start: 2022-12-02

## 2022-12-27 DIAGNOSIS — F41.9 ANXIETY: ICD-10-CM

## 2022-12-27 NOTE — TELEPHONE ENCOUNTER
This patient contacted the office for the following prescriptions to be refilled:    Medication requested :   Requested Prescriptions     Pending Prescriptions Disp Refills    LORazepam (ATIVAN) 1 mg tablet 30 Tablet 0      PCP: Martin Panda MD  LOV: 7/11/2022  NOV DMA: 1/3/2023  FUTURE APPT:   Future Appointments   Date Time Provider Katy Leali   1/3/2023  2:00 PM Ashleigh Soni MD DMA BS AMB         Thank you.

## 2022-12-28 RX ORDER — LORAZEPAM 1 MG/1
TABLET ORAL
Qty: 30 TABLET | Refills: 0 | Status: SHIPPED | OUTPATIENT
Start: 2022-12-28

## 2023-01-03 ENCOUNTER — OFFICE VISIT (OUTPATIENT)
Dept: FAMILY MEDICINE CLINIC | Age: 63
End: 2023-01-03
Payer: COMMERCIAL

## 2023-01-03 VITALS
DIASTOLIC BLOOD PRESSURE: 80 MMHG | TEMPERATURE: 98 F | SYSTOLIC BLOOD PRESSURE: 136 MMHG | BODY MASS INDEX: 27.62 KG/M2 | HEART RATE: 61 BPM | HEIGHT: 67 IN | OXYGEN SATURATION: 98 % | WEIGHT: 176 LBS | RESPIRATION RATE: 16 BRPM

## 2023-01-03 DIAGNOSIS — L24.9 IRRITANT CONTACT DERMATITIS, UNSPECIFIED TRIGGER: ICD-10-CM

## 2023-01-03 DIAGNOSIS — F41.9 ANXIETY: ICD-10-CM

## 2023-01-03 DIAGNOSIS — L30.1 DYSHIDROTIC ECZEMA: ICD-10-CM

## 2023-01-03 DIAGNOSIS — I10 ESSENTIAL HYPERTENSION: ICD-10-CM

## 2023-01-03 DIAGNOSIS — F17.210 CIGARETTE NICOTINE DEPENDENCE WITHOUT COMPLICATION: ICD-10-CM

## 2023-01-03 DIAGNOSIS — J45.40 MODERATE PERSISTENT ASTHMA WITHOUT COMPLICATION: Primary | ICD-10-CM

## 2023-01-03 DIAGNOSIS — Z23 NEEDS FLU SHOT: ICD-10-CM

## 2023-01-03 PROCEDURE — 3074F SYST BP LT 130 MM HG: CPT | Performed by: FAMILY MEDICINE

## 2023-01-03 PROCEDURE — 3078F DIAST BP <80 MM HG: CPT | Performed by: FAMILY MEDICINE

## 2023-01-03 PROCEDURE — 90686 IIV4 VACC NO PRSV 0.5 ML IM: CPT | Performed by: FAMILY MEDICINE

## 2023-01-03 PROCEDURE — 90471 IMMUNIZATION ADMIN: CPT | Performed by: FAMILY MEDICINE

## 2023-01-03 PROCEDURE — 99214 OFFICE O/P EST MOD 30 MIN: CPT | Performed by: FAMILY MEDICINE

## 2023-01-03 RX ORDER — CLOBETASOL PROPIONATE 0.5 MG/G
OINTMENT TOPICAL 2 TIMES DAILY
Qty: 60 G | Refills: 0 | Status: SHIPPED | OUTPATIENT
Start: 2023-01-03

## 2023-01-03 RX ORDER — LORAZEPAM 1 MG/1
TABLET ORAL
Qty: 30 TABLET | Refills: 5 | Status: SHIPPED | OUTPATIENT
Start: 2023-01-27

## 2023-01-03 NOTE — PROGRESS NOTES
HISTORY OF PRESENT ILLNESS  Nelly Avila is a 60 y.o. male with pmh/o HTN presenting for chest congestion for 1-2 weeks and rash on his lower legs bilaterally. He has been experiencing some chest congestion, he says possibly URI but he has not felt sick. No sick contacts at home. He has tried mucinex with no relief, no productive cough. He last used his albuterol inhaler 4-5 days ago with relief of symptoms. He also c/o lower leg rash for the last 1-2 weeks. He says he has had some lower leg swelling and was also working outside when the rash appeared. His legs are not swollen today but the rash is present. He says the rash does not itch and is not getting worse. Review of Systems   Constitutional:  Negative for chills, fever and weight loss. Eyes:  Negative for blurred vision and double vision. Respiratory:  Positive for cough and wheezing. Negative for hemoptysis, sputum production and shortness of breath. Cardiovascular:  Negative for chest pain and leg swelling. Gastrointestinal:  Negative for abdominal pain, constipation, diarrhea, nausea and vomiting. Genitourinary:  Negative for dysuria. Musculoskeletal:  Negative for myalgias. Skin:  Positive for rash. Negative for itching. Physical Exam  Constitutional:       Appearance: Normal appearance. He is normal weight. HENT:      Head: Normocephalic and atraumatic. Nose: No congestion or rhinorrhea. Mouth/Throat:      Mouth: Mucous membranes are moist.      Pharynx: Oropharynx is clear. No oropharyngeal exudate or posterior oropharyngeal erythema. Cardiovascular:      Rate and Rhythm: Normal rate and regular rhythm. Pulses: Normal pulses. Heart sounds: Normal heart sounds. Pulmonary:      Effort: Pulmonary effort is normal. No respiratory distress. Breath sounds: Normal breath sounds. No stridor. No wheezing, rhonchi or rales. Chest:      Chest wall: No tenderness.    Skin:     General: Skin is warm and dry. Findings: Rash present. Neurological:      General: No focal deficit present. Mental Status: He is alert. ASSESSMENT and PLAN  1. Rash - use clobetasol ointment for no more than 5 days, can use otc hydrocortisone after if rash persists. 2. Chest congestion - refill inhalers, continue mucinex. Continue smoking cessation. 3. Flu shot given  RTC as scheduled.

## 2023-01-03 NOTE — PROGRESS NOTES
Sujatha Simmons is a 60 y.o.  male and presents with    Chief Complaint   Patient presents with    Anxiety    Hypertension    Asthma     Med refills       Subjective:  Mr. Yuli Ascencio presents for chest congestion for 1-2 weeks and rash on his lower legs bilaterally. He has been experiencing some chest congestion, he says possibly URI but he has not felt sick. No sick contacts at home. He has tried mucinex with no relief, no productive cough. He last used his albuterol inhaler 4-5 days ago with relief of symptoms. He also c/o lower leg rash for the last 1-2 weeks. He says he has had some lower leg swelling and was also working outside when the rash appeared. His legs are not swollen today but the rash is present. He says the rash does not itch and is not getting worse. Review of Systems   Constitutional:  Negative for chills, fever and weight loss. Eyes:  Negative for blurred vision and double vision. Respiratory:  Positive for cough and wheezing. Negative for hemoptysis, sputum production and shortness of breath. Cardiovascular:  Negative for chest pain and leg swelling. Gastrointestinal:  Negative for abdominal pain, constipation, diarrhea, nausea and vomiting. Genitourinary:  Negative for dysuria. Musculoskeletal:  Negative for myalgias. Skin:  Positive for rash. Negative for itching. All other systems reviewed and are negative. Objective:  Vitals:    01/03/23 1435   BP: 136/80   Pulse: 61   Resp: 16   Temp: 98 °F (36.7 °C)   TempSrc: Temporal   SpO2: 98%   Weight: 176 lb (79.8 kg)   Height: 5' 7\" (1.702 m)   PainSc:   0 - No pain     Constitutional:       Appearance: Normal appearance. He is normal weight. HENT:      Head: Normocephalic and atraumatic. Nose: No congestion or rhinorrhea. Mouth/Throat:      Mouth: Mucous membranes are moist.      Pharynx: Oropharynx is clear. No oropharyngeal exudate or posterior oropharyngeal erythema.    Cardiovascular: Rate and Rhythm: Normal rate and regular rhythm. Pulses: Normal pulses. Heart sounds: Normal heart sounds. Pulmonary:      Effort: Pulmonary effort is normal. No respiratory distress. Breath sounds: Normal breath sounds. No stridor. No wheezing, rhonchi or rales. Chest:      Chest wall: No tenderness. Skin:     General: Skin is warm and dry. Findings: Rash present. Neurological:      General: No focal deficit present. Mental Status: He is alert. LABS     TESTS      Assessment/Plan:    1. Moderate persistent asthma without complication  Continue inhaled therapy    2. Cigarette nicotine dependence without complication  Encourage smoking cessation    3. Essential hypertension  Goal <130/80; borderline controlled; encourage low salt diet    4. Needs flu shot    - INFLUENZA, FLUARIX, FLULAVAL, FLUZONE (AGE 6 MO+), AFLURIA(AGE 3Y+) IM, PF, 0.5 ML    5. Irritant contact dermatitis, unspecified trigger  Topical corticosteroid prescribed    6. Dyshidrotic eczema    - clobetasoL (TEMOVATE) 0.05 % ointment; Apply  to affected area two (2) times a day. Dispense: 60 g; Refill: 0    7. Anxiety  Continue bzd with caution  - LORazepam (ATIVAN) 1 mg tablet; TAKE 1 TABLET BY MOUTH EVERY 8 HOURS AS NEEDED FOR ANXIETY  Dispense: 30 Tablet; Refill: 5      Lab review: no lab studies available for review at time of visit      I have discussed the diagnosis with the patient and the intended plan as seen in the above orders. The patient has received an after-visit summary and questions were answered concerning future plans. I have discussed medication side effects and warnings with the patient as well. I have reviewed the plan of care with the patient, accepted their input and they are in agreement with the treatment goals.

## 2023-01-03 NOTE — PROGRESS NOTES
Andie Bingham presents today for   Chief Complaint   Patient presents with    Anxiety    Hypertension    Asthma     Med refills       Is someone accompanying this pt? no    Is the patient using any DME equipment during OV? no    Depression Screening:  3 most recent PHQ Screens 1/3/2023   Little interest or pleasure in doing things Not at all   Feeling down, depressed, irritable, or hopeless Not at all   Total Score PHQ 2 0       Learning Assessment:  Learning Assessment 5/31/2019   PRIMARY LEARNER Patient   HIGHEST LEVEL OF EDUCATION - PRIMARY LEARNER  DID NOT GRADUATE [facility] PRIMARY LEARNER NONE   CO-LEARNER CAREGIVER No   PRIMARY LANGUAGE ENGLISH   LEARNER PREFERENCE PRIMARY LISTENING   ANSWERED BY patient   RELATIONSHIP SELF       Abuse Screening:  Abuse Screening Questionnaire 1/14/2021   Do you ever feel afraid of your partner? N   Are you in a relationship with someone who physically or mentally threatens you? N   Is it safe for you to go home? Y       Fall Risk  Fall Risk Assessment, last 12 mths 5/31/2019   Able to walk? Yes   Fall in past 12 months? No       Health Maintenance reviewed and discussed and ordered per Provider. Health Maintenance Due   Topic Date Due    COVID-19 Vaccine (1) Never done    Shingles Vaccine (1 of 2) Never done    Lipid Screen  01/14/2022    Flu Vaccine (1) 08/01/2022   .        1. \"Have you been to the ER, urgent care clinic since your last visit? Hospitalized since your last visit? \" No    2. \"Have you seen or consulted any other health care providers outside of the [address] [place] [address] since your last visit? \" No     3. For patients over 45: Has the patient had a colonoscopy? Yes - no Care Gap present     If the patient is female:    4. For patients over 40: Has the patient had a mammogram? No    5. For patients over 21: Has the patient had a pap smear?  No

## 2023-01-16 DIAGNOSIS — D75.89 MACROCYTOSIS: ICD-10-CM

## 2023-01-16 DIAGNOSIS — G45.9 TIA (TRANSIENT ISCHEMIC ATTACK): ICD-10-CM

## 2023-01-16 NOTE — TELEPHONE ENCOUNTER
This patient contacted the office for the following prescriptions to be refilled:    Medication requested :   Requested Prescriptions     Pending Prescriptions Disp Refills    folic acid (FOLVITE) 1 mg tablet 90 Tablet 0     Sig: Take 1 Tablet by mouth daily. PCP: Romeo Her MD  LOV: 1/3/2023  NOV DMA: Visit date not found  FUTURE APPT: No future appointments. Thank you.

## 2023-01-18 RX ORDER — FOLIC ACID 1 MG/1
1 TABLET ORAL DAILY
Qty: 90 TABLET | Refills: 0 | Status: SHIPPED | OUTPATIENT
Start: 2023-01-18

## 2023-05-26 ENCOUNTER — TELEPHONE (OUTPATIENT)
Facility: CLINIC | Age: 63
End: 2023-05-26

## 2023-05-31 NOTE — TELEPHONE ENCOUNTER
Pt states he is completely out of abuterol inhaler requested a refill on Friday    PLEASE FORWARD TO PCP WITH MEDICATION ATTACHED TO MESSAGE. This patient contacted the office for the following prescription to be refilled:     Medication requested:       Drug Name: Ventolin   Dosage - 108 (90 Base)     Pharmacy:   [facility] [address] [facility] [id number]   Phone:  [phone]    Fax:  [phone]     PCP: Shonda Ayers MD  LOV: 1/3/2023  NOV DMA: Visit date not found  FUTURE APPT: No future appointments. Thank you.

## 2023-06-01 RX ORDER — ALBUTEROL SULFATE 90 UG/1
AEROSOL, METERED RESPIRATORY (INHALATION)
Qty: 36 G | Refills: 0 | Status: SHIPPED | OUTPATIENT
Start: 2023-06-01

## 2023-07-05 NOTE — TELEPHONE ENCOUNTER
PLEASE FORWARD TO PCP WITH MEDICATIONS ATTACHED TO MESSAGE. This patient contacted the office for the following prescriptions to be refilled:    Medication requested :     DrugName: Ativan  Dosage- 1mg      Pharmacy: [facility] [address]    PCP: Valentin Ortiz MD  LOV: 01/03/2023    not found  FUTURE APPT: No future appointments. Thank you.

## 2023-07-06 ENCOUNTER — TELEPHONE (OUTPATIENT)
Facility: CLINIC | Age: 63
End: 2023-07-06

## 2023-07-06 RX ORDER — LORAZEPAM 1 MG/1
TABLET ORAL
Qty: 30 TABLET | Refills: 0 | OUTPATIENT
Start: 2023-07-06

## 2023-07-06 NOTE — TELEPHONE ENCOUNTER
Pt called to check the status of his requested refill for the Ativan 1mg, states he really needs his medication sent to his pharmacy.

## 2023-07-07 DIAGNOSIS — F41.1 GENERALIZED ANXIETY DISORDER: Primary | ICD-10-CM

## 2023-07-07 RX ORDER — LORAZEPAM 1 MG/1
1 TABLET ORAL EVERY 8 HOURS PRN
Qty: 20 TABLET | Refills: 0 | Status: SHIPPED | OUTPATIENT
Start: 2023-07-07 | End: 2023-07-17

## 2023-07-14 RX ORDER — ALBUTEROL SULFATE 90 UG/1
AEROSOL, METERED RESPIRATORY (INHALATION)
Qty: 36 G | Refills: 0 | Status: SHIPPED | OUTPATIENT
Start: 2023-07-14

## 2023-09-06 ENCOUNTER — OFFICE VISIT (OUTPATIENT)
Facility: CLINIC | Age: 63
End: 2023-09-06

## 2023-09-06 VITALS
TEMPERATURE: 97.5 F | BODY MASS INDEX: 25.93 KG/M2 | RESPIRATION RATE: 17 BRPM | SYSTOLIC BLOOD PRESSURE: 134 MMHG | DIASTOLIC BLOOD PRESSURE: 78 MMHG | WEIGHT: 165.2 LBS | HEIGHT: 67 IN | HEART RATE: 70 BPM | OXYGEN SATURATION: 95 %

## 2023-09-06 DIAGNOSIS — F41.1 GENERALIZED ANXIETY DISORDER: ICD-10-CM

## 2023-09-06 DIAGNOSIS — C44.41 BASAL CELL CARCINOMA (BCC) OF SKIN OF NECK: ICD-10-CM

## 2023-09-06 DIAGNOSIS — F17.210 NICOTINE DEPENDENCE, CIGARETTES, UNCOMPLICATED: ICD-10-CM

## 2023-09-06 DIAGNOSIS — E78.2 MIXED HYPERLIPIDEMIA: ICD-10-CM

## 2023-09-06 DIAGNOSIS — Z00.00 MEDICARE ANNUAL WELLNESS VISIT, SUBSEQUENT: Primary | ICD-10-CM

## 2023-09-06 DIAGNOSIS — M50.30 OTHER CERVICAL DISC DEGENERATION, UNSPECIFIED CERVICAL REGION: ICD-10-CM

## 2023-09-06 DIAGNOSIS — I10 ESSENTIAL HYPERTENSION: ICD-10-CM

## 2023-09-06 DIAGNOSIS — Z12.5 PROSTATE CANCER SCREENING: ICD-10-CM

## 2023-09-06 RX ORDER — IMIQUIMOD 12.5 MG/.25G
CREAM TOPICAL
COMMUNITY
Start: 2023-07-13

## 2023-09-06 RX ORDER — IBUPROFEN 600 MG/1
600 TABLET ORAL EVERY 6 HOURS PRN
COMMUNITY

## 2023-09-06 RX ORDER — LORAZEPAM 1 MG/1
1 TABLET ORAL EVERY 8 HOURS PRN
Qty: 20 TABLET | Refills: 5 | Status: SHIPPED | OUTPATIENT
Start: 2023-09-06 | End: 2024-03-04

## 2023-09-06 RX ORDER — HYDROCHLOROTHIAZIDE 25 MG/1
25 TABLET ORAL DAILY
Qty: 90 TABLET | Refills: 3 | Status: SHIPPED | OUTPATIENT
Start: 2023-09-06

## 2023-09-06 RX ORDER — AMLODIPINE BESYLATE 5 MG/1
5 TABLET ORAL DAILY
Qty: 90 TABLET | Refills: 4 | Status: SHIPPED | OUTPATIENT
Start: 2023-09-06

## 2023-09-06 RX ORDER — ATORVASTATIN CALCIUM 80 MG/1
80 TABLET, FILM COATED ORAL NIGHTLY
Qty: 90 TABLET | Refills: 3 | Status: SHIPPED | OUTPATIENT
Start: 2023-09-06

## 2023-09-06 RX ORDER — NICOTINE 21 MG/24HR
1 PATCH, TRANSDERMAL 24 HOURS TRANSDERMAL DAILY
Qty: 42 PATCH | Refills: 0 | Status: SHIPPED | OUTPATIENT
Start: 2023-09-06 | End: 2023-10-18

## 2023-09-06 RX ORDER — BUPROPION HYDROCHLORIDE 150 MG/1
150 TABLET, EXTENDED RELEASE ORAL 2 TIMES DAILY
Qty: 60 TABLET | Refills: 3 | Status: SHIPPED | OUTPATIENT
Start: 2023-09-06

## 2023-09-06 RX ORDER — GABAPENTIN 600 MG/1
600 TABLET ORAL 3 TIMES DAILY
COMMUNITY
Start: 2023-08-19

## 2023-09-06 RX ORDER — CYCLOBENZAPRINE HCL 10 MG
10 TABLET ORAL 3 TIMES DAILY PRN
Qty: 90 TABLET | Refills: 3 | Status: SHIPPED | OUTPATIENT
Start: 2023-09-06

## 2023-09-06 SDOH — ECONOMIC STABILITY: HOUSING INSECURITY
IN THE LAST 12 MONTHS, WAS THERE A TIME WHEN YOU DID NOT HAVE A STEADY PLACE TO SLEEP OR SLEPT IN A SHELTER (INCLUDING NOW)?: NO

## 2023-09-06 SDOH — ECONOMIC STABILITY: INCOME INSECURITY: HOW HARD IS IT FOR YOU TO PAY FOR THE VERY BASICS LIKE FOOD, HOUSING, MEDICAL CARE, AND HEATING?: NOT VERY HARD

## 2023-09-06 SDOH — ECONOMIC STABILITY: FOOD INSECURITY: WITHIN THE PAST 12 MONTHS, THE FOOD YOU BOUGHT JUST DIDN'T LAST AND YOU DIDN'T HAVE MONEY TO GET MORE.: NEVER TRUE

## 2023-09-06 SDOH — ECONOMIC STABILITY: FOOD INSECURITY: WITHIN THE PAST 12 MONTHS, YOU WORRIED THAT YOUR FOOD WOULD RUN OUT BEFORE YOU GOT MONEY TO BUY MORE.: NEVER TRUE

## 2023-09-06 ASSESSMENT — ANXIETY QUESTIONNAIRES
5. BEING SO RESTLESS THAT IT IS HARD TO SIT STILL: 0
4. TROUBLE RELAXING: 0
IF YOU CHECKED OFF ANY PROBLEMS ON THIS QUESTIONNAIRE, HOW DIFFICULT HAVE THESE PROBLEMS MADE IT FOR YOU TO DO YOUR WORK, TAKE CARE OF THINGS AT HOME, OR GET ALONG WITH OTHER PEOPLE: NOT DIFFICULT AT ALL
3. WORRYING TOO MUCH ABOUT DIFFERENT THINGS: 0
GAD7 TOTAL SCORE: 0
2. NOT BEING ABLE TO STOP OR CONTROL WORRYING: 0
6. BECOMING EASILY ANNOYED OR IRRITABLE: 0
7. FEELING AFRAID AS IF SOMETHING AWFUL MIGHT HAPPEN: 0
1. FEELING NERVOUS, ANXIOUS, OR ON EDGE: 0

## 2023-09-06 ASSESSMENT — PATIENT HEALTH QUESTIONNAIRE - PHQ9
SUM OF ALL RESPONSES TO PHQ9 QUESTIONS 1 & 2: 0
SUM OF ALL RESPONSES TO PHQ QUESTIONS 1-9: 0
2. FEELING DOWN, DEPRESSED OR HOPELESS: 0
SUM OF ALL RESPONSES TO PHQ QUESTIONS 1-9: 0
SUM OF ALL RESPONSES TO PHQ QUESTIONS 1-9: 0
1. LITTLE INTEREST OR PLEASURE IN DOING THINGS: 0
SUM OF ALL RESPONSES TO PHQ QUESTIONS 1-9: 0

## 2023-09-06 ASSESSMENT — VISUAL ACUITY
OS_CC: 20/25
OD_CC: 20/25

## 2023-09-06 ASSESSMENT — LIFESTYLE VARIABLES
HOW MANY STANDARD DRINKS CONTAINING ALCOHOL DO YOU HAVE ON A TYPICAL DAY: 1 OR 2
HOW OFTEN DO YOU HAVE A DRINK CONTAINING ALCOHOL: 2-4 TIMES A MONTH

## 2023-09-06 NOTE — PROGRESS NOTES
Gauri Cardona is a 58 y.o. presents today for   Chief Complaint   Patient presents with    Medicare AWV         Depression Screening:   PHQ-9 Questionaire 9/6/2023 1/3/2023 7/11/2022 5/10/2022   Little interest or pleasure in doing things 0 0 0 0   Feeling down, depressed, or hopeless 0 0 0 0   PHQ-9 Total Score 0 0 0 0       Abuse Screening:  No flowsheet data found. Learning Assessment:  No question data found. Fall Risk:  No flowsheet data found. Coordination of Care:   1. \"Have you been to the ER, urgent care clinic since your last visit? Hospitalized since your last visit? \" no    2. \"Have you seen or consulted any other health care providers outside of the [address] [place] [address] since your last visit? \" no    3. For patients aged 43-73: Has the patient had a colonoscopy / FIT/ Cologuard? yes    If the patient is female:    4. For patients aged 43-66: Has the patient had a mammogram within the past 2 years? no    5. For patients aged 21-65: Has the patient had a pap smear? no    Health Maintenance: reviewed and discussed and ordered per Provider.     Health Maintenance Due   Topic Date Due    COVID-19 Vaccine (1) Never done    Diabetes screen  Never done    Shingles vaccine (1 of 2) Never done    Lipids  01/14/2022    Annual Wellness Visit (AWV)  05/11/2023    Flu vaccine (1) 08/01/2023

## 2023-09-06 NOTE — PATIENT INSTRUCTIONS
Learning About Vision Tests  What are vision tests? The four most common vision tests are visual acuity tests, refraction, visual field tests, and color vision tests. Visual acuity (sharpness) tests  These tests are used: To see if you need glasses or contact lenses. To monitor an eye problem. To check an eye injury. Visual acuity tests are done as part of routine exams. You may also have this test when you get your 's license or apply for some types of jobs. Visual field tests  These tests are used: To check for vision loss in any area of your range of vision. To screen for certain eye diseases. To look for nerve damage after a stroke, head injury, or other problem that could reduce blood flow to the brain. Refraction and color tests  A refraction test is done to find the right prescription for glasses and contact lenses. A color vision test is done to check for color blindness. Color vision is often tested as part of a routine exam. You may also have this test when you apply for a job where recognizing different colors is important, such as , electronics, or the [employer]. How are vision tests done? Visual acuity test   You cover one eye at a time. You read aloud from a wall chart across the room. You read aloud from a small card that you hold in your hand. Refraction   You look into a special device. The device puts lenses of different strengths in front of each eye to see how strong your glasses or contact lenses need to be. Visual field tests   Your doctor may have you look through special machines. Or your doctor may simply have you stare straight ahead while they move a finger into and out of your field of vision. Color vision test   You look at pieces of printed test patterns in various colors. You say what number or symbol you see. Your doctor may have you trace the number or symbol using a pointer. How do these tests feel?   There is very little chance of

## 2023-10-12 ENCOUNTER — TELEPHONE (OUTPATIENT)
Facility: CLINIC | Age: 63
End: 2023-10-12

## 2023-10-12 NOTE — TELEPHONE ENCOUNTER
----- Message from [id number] [facility] [address] sent at 10/3/2023 10:41 AM EDT -----  Subject: Medication Problem    Medication: LORazepam (ATIVAN) 1 MG tablet  Dosage: 1 mg   Ordering Provider: Aggie Hardin    Question/Problem: last refill was for 20 instead of 30 . He needs a refill   for 30.  Thank you       Pharmacy: [address] [phone]    ---------------------------------------------------------------------------  --------------  Luc MERIDA  [phone]; OK to leave message on voicemail  ---------------------------------------------------------------------------  --------------    SCRIPT ANSWERS  Relationship to Patient: Self

## 2023-10-13 ENCOUNTER — HOSPITAL ENCOUNTER (OUTPATIENT)
Facility: HOSPITAL | Age: 63
Setting detail: SPECIMEN
End: 2023-10-13
Payer: MEDICARE

## 2023-10-13 DIAGNOSIS — E78.2 MIXED HYPERLIPIDEMIA: ICD-10-CM

## 2023-10-13 DIAGNOSIS — Z12.5 PROSTATE CANCER SCREENING: ICD-10-CM

## 2023-10-13 LAB
CHOLEST SERPL-MCNC: 145 MG/DL
HDLC SERPL-MCNC: 94 MG/DL (ref 40–60)
HDLC SERPL: 1.5 (ref 0–5)
LDLC SERPL CALC-MCNC: 43.4 MG/DL (ref 0–100)
LIPID PANEL: ABNORMAL
PSA SERPL-MCNC: 1.4 NG/ML (ref 0–4)
TRIGL SERPL-MCNC: 38 MG/DL
VLDLC SERPL CALC-MCNC: 7.6 MG/DL

## 2023-10-13 PROCEDURE — 80061 LIPID PANEL: CPT

## 2023-10-13 PROCEDURE — G0103 PSA SCREENING: HCPCS

## 2023-10-13 PROCEDURE — 36415 COLL VENOUS BLD VENIPUNCTURE: CPT

## 2023-10-24 ENCOUNTER — OFFICE VISIT (OUTPATIENT)
Facility: CLINIC | Age: 63
End: 2023-10-24
Payer: MEDICARE

## 2023-10-24 VITALS
RESPIRATION RATE: 16 BRPM | HEIGHT: 67 IN | OXYGEN SATURATION: 92 % | HEART RATE: 64 BPM | DIASTOLIC BLOOD PRESSURE: 72 MMHG | WEIGHT: 163 LBS | SYSTOLIC BLOOD PRESSURE: 154 MMHG | TEMPERATURE: 98.9 F | BODY MASS INDEX: 25.58 KG/M2

## 2023-10-24 DIAGNOSIS — R73.01 IMPAIRED FASTING GLUCOSE: Primary | ICD-10-CM

## 2023-10-24 DIAGNOSIS — I10 ESSENTIAL HYPERTENSION: ICD-10-CM

## 2023-10-24 DIAGNOSIS — F17.210 NICOTINE DEPENDENCE, CIGARETTES, UNCOMPLICATED: ICD-10-CM

## 2023-10-24 DIAGNOSIS — F41.1 GENERALIZED ANXIETY DISORDER: ICD-10-CM

## 2023-10-24 LAB — HBA1C MFR BLD: 5.9 %

## 2023-10-24 PROCEDURE — G8484 FLU IMMUNIZE NO ADMIN: HCPCS | Performed by: FAMILY MEDICINE

## 2023-10-24 PROCEDURE — 83036 HEMOGLOBIN GLYCOSYLATED A1C: CPT | Performed by: FAMILY MEDICINE

## 2023-10-24 PROCEDURE — G8419 CALC BMI OUT NRM PARAM NOF/U: HCPCS | Performed by: FAMILY MEDICINE

## 2023-10-24 PROCEDURE — 3078F DIAST BP <80 MM HG: CPT | Performed by: FAMILY MEDICINE

## 2023-10-24 PROCEDURE — 99214 OFFICE O/P EST MOD 30 MIN: CPT | Performed by: FAMILY MEDICINE

## 2023-10-24 PROCEDURE — 4004F PT TOBACCO SCREEN RCVD TLK: CPT | Performed by: FAMILY MEDICINE

## 2023-10-24 PROCEDURE — 3074F SYST BP LT 130 MM HG: CPT | Performed by: FAMILY MEDICINE

## 2023-10-24 PROCEDURE — G8428 CUR MEDS NOT DOCUMENT: HCPCS | Performed by: FAMILY MEDICINE

## 2023-10-24 PROCEDURE — 3017F COLORECTAL CA SCREEN DOC REV: CPT | Performed by: FAMILY MEDICINE

## 2023-10-24 NOTE — PROGRESS NOTES
Juventino Aponte is a 58 y.o. presents today for   Chief Complaint   Patient presents with    Follow-up     Is someone accompanying this pt? no    Is the patient using any DME equipment during OV? no  Vitals:    10/24/23 1554   BP: (!) 154/72   Pulse:    Resp:    Temp:    SpO2:        Depression Screenin/6/2023    12:09 PM 1/3/2023     2:34 PM 2022     2:30 PM 5/10/2022     2:48 PM   PHQ-9 Questionaire   Little interest or pleasure in doing things 0 0 0 0   Feeling down, depressed, or hopeless 0 0 0 0   PHQ-9 Total Score 0 0 0 0        Abuse Screening:       No data to display                 Learning Assessment Screening:   No question data found. Fall Risk Screening:        No data to display                    Health Maintenance: reviewed and discussed and ordered per Provider. Health Maintenance Due   Topic Date Due    COVID-19 Vaccine (1) Never done    Diabetes screen  Never done    Shingles vaccine (1 of 2) Never done    Pneumococcal 0-64 years Vaccine (2 - PCV) 2017    Flu vaccine (1) 2023         Coordination of Care:   1. \"Have you been to the ER, urgent care clinic since your last visit? Hospitalized since your last visit? \" no    2. \"Have you seen or consulted any other health care providers outside of the [address] [place] [address] since your last visit? \" no    3. For patients aged 43-73: Has the patient had a colonoscopy / FIT/ Cologuard? Yes - no Care Gap present  If the patient is female:    4. For patients aged 43-66: Has the patient had a mammogram within the past 2 years? NA - based on age or sex    11. For patients aged 21-65: Has the patient had a pap smear? NA - based on age or sex    Advanced Directive:  1. Do you have an Advanced Directive? Yes     2. Would you like information on Advanced Directives?  No    Danika Nayak CMA

## 2024-04-03 ENCOUNTER — TELEPHONE (OUTPATIENT)
Facility: CLINIC | Age: 64
End: 2024-04-03

## 2024-04-12 ENCOUNTER — TELEMEDICINE (OUTPATIENT)
Facility: CLINIC | Age: 64
End: 2024-04-12

## 2024-04-12 DIAGNOSIS — Z86.73 HISTORY OF STROKE: ICD-10-CM

## 2024-04-12 DIAGNOSIS — I10 ESSENTIAL HYPERTENSION: ICD-10-CM

## 2024-04-12 DIAGNOSIS — F17.210 NICOTINE DEPENDENCE, CIGARETTES, UNCOMPLICATED: Primary | ICD-10-CM

## 2024-04-12 DIAGNOSIS — F41.1 GENERALIZED ANXIETY DISORDER: ICD-10-CM

## 2024-04-12 RX ORDER — HYDROCHLOROTHIAZIDE 25 MG/1
25 TABLET ORAL DAILY
Qty: 90 TABLET | Refills: 3 | Status: SHIPPED | OUTPATIENT
Start: 2024-04-12

## 2024-04-12 RX ORDER — LORAZEPAM 1 MG/1
1 TABLET ORAL EVERY 8 HOURS PRN
Qty: 30 TABLET | Refills: 5 | Status: SHIPPED | OUTPATIENT
Start: 2024-04-12 | End: 2024-10-09

## 2024-04-12 SDOH — ECONOMIC STABILITY: FOOD INSECURITY: WITHIN THE PAST 12 MONTHS, THE FOOD YOU BOUGHT JUST DIDN'T LAST AND YOU DIDN'T HAVE MONEY TO GET MORE.: NEVER TRUE

## 2024-04-12 SDOH — ECONOMIC STABILITY: FOOD INSECURITY: WITHIN THE PAST 12 MONTHS, YOU WORRIED THAT YOUR FOOD WOULD RUN OUT BEFORE YOU GOT MONEY TO BUY MORE.: NEVER TRUE

## 2024-04-12 SDOH — ECONOMIC STABILITY: INCOME INSECURITY: HOW HARD IS IT FOR YOU TO PAY FOR THE VERY BASICS LIKE FOOD, HOUSING, MEDICAL CARE, AND HEATING?: NOT HARD AT ALL

## 2024-04-12 ASSESSMENT — PATIENT HEALTH QUESTIONNAIRE - PHQ9
SUM OF ALL RESPONSES TO PHQ QUESTIONS 1-9: 0
SUM OF ALL RESPONSES TO PHQ9 QUESTIONS 1 & 2: 0
2. FEELING DOWN, DEPRESSED OR HOPELESS: NOT AT ALL
SUM OF ALL RESPONSES TO PHQ9 QUESTIONS 1 & 2: 0
1. LITTLE INTEREST OR PLEASURE IN DOING THINGS: NOT AT ALL
SUM OF ALL RESPONSES TO PHQ QUESTIONS 1-9: 0
2. FEELING DOWN, DEPRESSED OR HOPELESS: NOT AT ALL
1. LITTLE INTEREST OR PLEASURE IN DOING THINGS: NOT AT ALL
SUM OF ALL RESPONSES TO PHQ QUESTIONS 1-9: 0

## 2024-04-12 NOTE — PROGRESS NOTES
Jose David Pierce is a 64 y.o. presents today for No chief complaint on file.    Is someone accompanying this pt? no    Is the patient using any DME equipment during OV? no  There were no vitals filed for this visit.    Depression Screenin/12/2024     9:59 AM 2023    12:09 PM 1/3/2023     2:34 PM 2022     2:30 PM 5/10/2022     2:48 PM   PHQ-9 Questionaire   Little interest or pleasure in doing things 0 0 0 0 0   Feeling down, depressed, or hopeless 0 0 0 0 0   PHQ-9 Total Score 0 0 0 0 0        Abuse Screening:       No data to display                 Learning Assessment Screening:   No question data found.     Fall Risk Screening:        No data to display                    Health Maintenance: reviewed and discussed and ordered per Provider.    Health Maintenance Due   Topic Date Due    COVID-19 Vaccine (1) Never done    Shingles vaccine (1 of 2) Never done    Pneumococcal 65+ years Vaccine (2 of 2 - PCV) 2017    Respiratory Syncytial Virus (RSV) Pregnant or age 60 yrs+ (1 - 1-dose 60+ series) Never done    Annual Wellness Visit (Medicare Advantage)  2024    AAA screen  2024         Coordination of Care:   1. \"Have you been to the ER, urgent care clinic since your last visit?  Hospitalized since your last visit?\" no    2. \"Have you seen or consulted any other health care providers outside of the [facility] since your last visit?\" no    3. For patients aged 45-75: Has the patient had a colonoscopy / FIT/ Cologuard?Yes - no Care Gap present  If the patient is female:    4. For patients aged 40-74: Has the patient had a mammogram within the past 2 years? NA - based on age or sex    5. For patients aged 21-65: Has the patient had a pap smear? NA - based on age or sex    Advanced Directive:  1. Do you have an Advanced Directive? yes     2. Would you like information on Advanced Directives? No  
Take 1 tablet by mouth 2 times daily Yes Nahid Gómez MD   gabapentin (NEURONTIN) 600 MG tablet Take 1 tablet by mouth 3 times daily. Yes Provider, MD Dane   ibuprofen (ADVIL;MOTRIN) 600 MG tablet Take 1 tablet by mouth every 6 hours as needed Yes Provider, MD Dane   acetaminophen (TYLENOL) 500 MG tablet Take 2 tablets by mouth every 6 hours as needed Yes Automatic Reconciliation, Ar   aspirin 325 MG tablet Take 1 tablet by mouth daily Yes Automatic Reconciliation, Ar   cetirizine (ZYRTEC) 10 MG tablet Take 1 tablet by mouth daily Yes Automatic Reconciliation, Ar   clobetasol (TEMOVATE) 0.05 % ointment Apply topically 2 times daily Yes Automatic Reconciliation, Ar   folic acid (FOLVITE) 1 MG tablet Take 1 tablet by mouth daily Yes Automatic Reconciliation, Ar   lidocaine (LIDODERM) 5 % Apply patch to the affected area for 12 hours a day and remove for 12 hours a day. Yes Automatic Reconciliation, Ar   thiamine 100 MG tablet Take 1 tablet by mouth daily Yes Automatic Reconciliation, Ar   nicotine (NICODERM CQ) 14 MG/24HR Place 1 patch onto the skin daily  Nahid Gómez MD       Social History     Tobacco Use    Smoking status: Every Day     Current packs/day: 0.25     Types: Cigarettes    Smokeless tobacco: Never   Substance Use Topics    Alcohol use: No     Alcohol/week: 0.0 standard drinks of alcohol    Drug use: No            PHYSICAL EXAMINATION:  [ INSTRUCTIONS:  \"[x]\" Indicates a positive item  \"[]\" Indicates a negative item  -- DELETE ALL ITEMS NOT EXAMINED]  Vital Signs: (As obtained by patient/caregiver or practitioner observation)    Blood pressure-  Heart rate-    Respiratory rate-    Temperature-  Pulse oximetry-     Constitutional: [x] Appears well-developed and well-nourished [x] No apparent distress      [] Abnormal-   Mental status  [x] Alert and awake  [x] Oriented to person/place/time []Able to follow commands      Eyes:  EOM    [x]  Normal  [] Abnormal-  Sclera  [x]  Normal  []

## 2024-05-16 RX ORDER — ALBUTEROL SULFATE 90 UG/1
AEROSOL, METERED RESPIRATORY (INHALATION)
Qty: 36 G | Refills: 0 | Status: SHIPPED | OUTPATIENT
Start: 2024-05-16

## 2024-06-05 DIAGNOSIS — I10 ESSENTIAL HYPERTENSION: ICD-10-CM

## 2024-06-05 RX ORDER — AMLODIPINE BESYLATE 5 MG/1
5 TABLET ORAL DAILY
Qty: 90 TABLET | Refills: 4 | Status: SHIPPED | OUTPATIENT
Start: 2024-06-05

## 2024-07-29 ENCOUNTER — CARE COORDINATION (OUTPATIENT)
Facility: CLINIC | Age: 64
End: 2024-07-29
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
## 2024-08-19 DIAGNOSIS — E78.2 MIXED HYPERLIPIDEMIA: ICD-10-CM

## 2024-08-20 RX ORDER — ATORVASTATIN CALCIUM 80 MG/1
80 TABLET, FILM COATED ORAL NIGHTLY
Qty: 90 TABLET | Refills: 3 | Status: SHIPPED | OUTPATIENT
Start: 2024-08-20

## 2024-10-14 ENCOUNTER — TELEPHONE (OUTPATIENT)
Facility: CLINIC | Age: 64
End: 2024-10-14

## 2024-10-14 NOTE — TELEPHONE ENCOUNTER
Called this patient to assist him with scheduling AWV. Voice mail left for him to return call to the office to schedule .

## 2024-10-28 DIAGNOSIS — F41.1 GENERALIZED ANXIETY DISORDER: ICD-10-CM

## 2024-10-29 ENCOUNTER — COMMUNITY OUTREACH (OUTPATIENT)
Facility: CLINIC | Age: 64
End: 2024-10-29

## 2024-10-30 RX ORDER — LORAZEPAM 1 MG/1
TABLET ORAL
Qty: 30 TABLET | Refills: 0 | OUTPATIENT
Start: 2024-10-30

## 2024-11-07 ENCOUNTER — OFFICE VISIT (OUTPATIENT)
Facility: CLINIC | Age: 64
End: 2024-11-07

## 2024-11-07 VITALS
HEIGHT: 67 IN | WEIGHT: 167.4 LBS | DIASTOLIC BLOOD PRESSURE: 58 MMHG | TEMPERATURE: 98 F | RESPIRATION RATE: 18 BRPM | OXYGEN SATURATION: 96 % | HEART RATE: 75 BPM | BODY MASS INDEX: 26.27 KG/M2 | SYSTOLIC BLOOD PRESSURE: 132 MMHG
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
## 2024-11-07 DIAGNOSIS — E78.2 MIXED HYPERLIPIDEMIA: ICD-10-CM

## 2024-11-07 DIAGNOSIS — R73.01 IMPAIRED FASTING GLUCOSE: ICD-10-CM

## 2024-11-07 DIAGNOSIS — Z00.00 MEDICARE ANNUAL WELLNESS VISIT, SUBSEQUENT: Primary | ICD-10-CM

## 2024-11-07 DIAGNOSIS — M54.16 LUMBAR RADICULAR PAIN: ICD-10-CM

## 2024-11-07 DIAGNOSIS — F41.1 GENERALIZED ANXIETY DISORDER: ICD-10-CM

## 2024-11-07 DIAGNOSIS — Z71.89 ADVANCE CARE PLANNING: ICD-10-CM

## 2024-11-07 DIAGNOSIS — Z12.11 COLON CANCER SCREENING: ICD-10-CM

## 2024-11-07 DIAGNOSIS — F17.210 NICOTINE DEPENDENCE, CIGARETTES, UNCOMPLICATED: ICD-10-CM

## 2024-11-07 DIAGNOSIS — J45.40 MODERATE PERSISTENT ASTHMA, UNCOMPLICATED: ICD-10-CM

## 2024-11-07 DIAGNOSIS — I10 ESSENTIAL (PRIMARY) HYPERTENSION: ICD-10-CM

## 2024-11-07 RX ORDER — LORAZEPAM 1 MG/1
1 TABLET ORAL EVERY 8 HOURS PRN
Qty: 30 TABLET | Refills: 5 | Status: SHIPPED | OUTPATIENT
Start: 2024-11-07 | End: 2025-05-06

## 2024-11-07 RX ORDER — BUPROPION HYDROCHLORIDE 150 MG/1
150 TABLET, EXTENDED RELEASE ORAL 2 TIMES DAILY
Qty: 60 TABLET | Refills: 3 | Status: SHIPPED | OUTPATIENT
Start: 2024-11-07

## 2024-11-07 RX ORDER — ALBUTEROL SULFATE 90 UG/1
AEROSOL, METERED RESPIRATORY (INHALATION)
Qty: 36 G | Refills: 0 | Status: SHIPPED | OUTPATIENT
Start: 2024-11-07

## 2024-11-07 RX ORDER — PREGABALIN 150 MG/1
150 CAPSULE ORAL 2 TIMES DAILY
Qty: 60 CAPSULE | Refills: 5 | Status: SHIPPED | OUTPATIENT
Start: 2024-11-07 | End: 2025-05-06

## 2024-11-07 SDOH — ECONOMIC STABILITY: FOOD INSECURITY: WITHIN THE PAST 12 MONTHS, YOU WORRIED THAT YOUR FOOD WOULD RUN OUT BEFORE YOU GOT MONEY TO BUY MORE.: NEVER TRUE

## 2024-11-07 SDOH — ECONOMIC STABILITY: FOOD INSECURITY: WITHIN THE PAST 12 MONTHS, THE FOOD YOU BOUGHT JUST DIDN'T LAST AND YOU DIDN'T HAVE MONEY TO GET MORE.: NEVER TRUE

## 2024-11-07 SDOH — ECONOMIC STABILITY: INCOME INSECURITY: HOW HARD IS IT FOR YOU TO PAY FOR THE VERY BASICS LIKE FOOD, HOUSING, MEDICAL CARE, AND HEATING?: NOT HARD AT ALL

## 2024-11-07 ASSESSMENT — LIFESTYLE VARIABLES
HOW OFTEN DO YOU HAVE A DRINK CONTAINING ALCOHOL: NEVER
HOW MANY STANDARD DRINKS CONTAINING ALCOHOL DO YOU HAVE ON A TYPICAL DAY: PATIENT DOES NOT DRINK

## 2024-11-07 ASSESSMENT — ANXIETY QUESTIONNAIRES
IF YOU CHECKED OFF ANY PROBLEMS ON THIS QUESTIONNAIRE, HOW DIFFICULT HAVE THESE PROBLEMS MADE IT FOR YOU TO DO YOUR WORK, TAKE CARE OF THINGS AT HOME, OR GET ALONG WITH OTHER PEOPLE: NOT DIFFICULT AT ALL
2. NOT BEING ABLE TO STOP OR CONTROL WORRYING: NOT AT ALL
3. WORRYING TOO MUCH ABOUT DIFFERENT THINGS: NOT AT ALL
7. FEELING AFRAID AS IF SOMETHING AWFUL MIGHT HAPPEN: NOT AT ALL
5. BEING SO RESTLESS THAT IT IS HARD TO SIT STILL: NOT AT ALL
4. TROUBLE RELAXING: NOT AT ALL
6. BECOMING EASILY ANNOYED OR IRRITABLE: NOT AT ALL
GAD7 TOTAL SCORE: 0
1. FEELING NERVOUS, ANXIOUS, OR ON EDGE: NOT AT ALL

## 2024-11-07 ASSESSMENT — PATIENT HEALTH QUESTIONNAIRE - PHQ9
SUM OF ALL RESPONSES TO PHQ QUESTIONS 1-9: 0
SUM OF ALL RESPONSES TO PHQ9 QUESTIONS 1 & 2: 0
1. LITTLE INTEREST OR PLEASURE IN DOING THINGS: NOT AT ALL
2. FEELING DOWN, DEPRESSED OR HOPELESS: NOT AT ALL
SUM OF ALL RESPONSES TO PHQ QUESTIONS 1-9: 0
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
## 2024-11-07 NOTE — PATIENT INSTRUCTIONS

## 2024-11-07 NOTE — PROGRESS NOTES
Jose David Pierce is a 64 y.o. year old male who presents today for   Chief Complaint   Patient presents with    Medicare AWV    Medication Refill        \"Have you been to the ER, urgent care clinic since your last visit?  Hospitalized since your last visit?\"   NO     “Have you seen or consulted any other health care providers outside our system since your last visit?”   NO       “Have you had a colorectal cancer screening such as a colonoscopy/FIT/Cologuard?    NO    Date of last Colonoscopy: 6/11/2014  No cologuard on file  No FIT/FOBT on file   No flexible sigmoidoscopy on file           Chrissy Ta [facility]  [address] [place] [address] [address]  Ph: [phone]  Direct Fax: [phone]

## 2024-12-21 LAB — NONINV COLON CA DNA+OCC BLD SCRN STL QL: NEGATIVE

## 2025-01-19 DIAGNOSIS — J45.40 MODERATE PERSISTENT ASTHMA, UNCOMPLICATED: ICD-10-CM

## 2025-01-20 RX ORDER — ALBUTEROL SULFATE 90 UG/1
AEROSOL, METERED RESPIRATORY (INHALATION)
Qty: 36 G | Refills: 0 | Status: SHIPPED | OUTPATIENT
Start: 2025-01-20

## 2025-03-13 DIAGNOSIS — J45.40 MODERATE PERSISTENT ASTHMA, UNCOMPLICATED: ICD-10-CM

## 2025-03-14 RX ORDER — ALBUTEROL SULFATE 90 UG/1
AEROSOL, METERED RESPIRATORY (INHALATION)
Qty: 36 G | Refills: 0 | Status: SHIPPED | OUTPATIENT
Start: 2025-03-14

## 2025-03-19 ENCOUNTER — OFFICE VISIT (OUTPATIENT)
Facility: CLINIC | Age: 65
End: 2025-03-19
Payer: MEDICARE

## 2025-03-19 VITALS
BODY MASS INDEX: 27 KG/M2 | WEIGHT: 172 LBS | TEMPERATURE: 98.4 F | HEIGHT: 67 IN | HEART RATE: 64 BPM | OXYGEN SATURATION: 96 % | RESPIRATION RATE: 18 BRPM | SYSTOLIC BLOOD PRESSURE: 138 MMHG | DIASTOLIC BLOOD PRESSURE: 79 MMHG

## 2025-03-19 DIAGNOSIS — M51.362 DEGENERATION OF INTERVERTEBRAL DISC OF LUMBAR REGION WITH DISCOGENIC BACK PAIN AND LOWER EXTREMITY PAIN: ICD-10-CM

## 2025-03-19 DIAGNOSIS — F41.1 GENERALIZED ANXIETY DISORDER: ICD-10-CM

## 2025-03-19 DIAGNOSIS — Z01.818 PRE-OP EXAMINATION: Primary | ICD-10-CM

## 2025-03-19 DIAGNOSIS — I10 ESSENTIAL (PRIMARY) HYPERTENSION: ICD-10-CM

## 2025-03-19 DIAGNOSIS — Z00.00 MEDICARE ANNUAL WELLNESS VISIT, SUBSEQUENT: ICD-10-CM

## 2025-03-19 DIAGNOSIS — E78.2 MIXED HYPERLIPIDEMIA: ICD-10-CM

## 2025-03-19 PROCEDURE — 99214 OFFICE O/P EST MOD 30 MIN: CPT | Performed by: FAMILY MEDICINE

## 2025-03-19 PROCEDURE — 93000 ELECTROCARDIOGRAM COMPLETE: CPT | Performed by: FAMILY MEDICINE

## 2025-03-19 PROCEDURE — 1125F AMNT PAIN NOTED PAIN PRSNT: CPT | Performed by: FAMILY MEDICINE

## 2025-03-19 PROCEDURE — 4004F PT TOBACCO SCREEN RCVD TLK: CPT | Performed by: FAMILY MEDICINE

## 2025-03-19 PROCEDURE — 3075F SYST BP GE 130 - 139MM HG: CPT | Performed by: FAMILY MEDICINE

## 2025-03-19 PROCEDURE — 3078F DIAST BP <80 MM HG: CPT | Performed by: FAMILY MEDICINE

## 2025-03-19 PROCEDURE — 1123F ACP DISCUSS/DSCN MKR DOCD: CPT | Performed by: FAMILY MEDICINE

## 2025-03-19 PROCEDURE — 1159F MED LIST DOCD IN RCRD: CPT | Performed by: FAMILY MEDICINE

## 2025-03-19 PROCEDURE — G0439 PPPS, SUBSEQ VISIT: HCPCS | Performed by: FAMILY MEDICINE

## 2025-03-19 PROCEDURE — 3017F COLORECTAL CA SCREEN DOC REV: CPT | Performed by: FAMILY MEDICINE

## 2025-03-19 PROCEDURE — G8427 DOCREV CUR MEDS BY ELIG CLIN: HCPCS | Performed by: FAMILY MEDICINE

## 2025-03-19 PROCEDURE — G8419 CALC BMI OUT NRM PARAM NOF/U: HCPCS | Performed by: FAMILY MEDICINE

## 2025-03-19 SDOH — ECONOMIC STABILITY: FOOD INSECURITY: WITHIN THE PAST 12 MONTHS, THE FOOD YOU BOUGHT JUST DIDN'T LAST AND YOU DIDN'T HAVE MONEY TO GET MORE.: NEVER TRUE

## 2025-03-19 SDOH — ECONOMIC STABILITY: FOOD INSECURITY: WITHIN THE PAST 12 MONTHS, YOU WORRIED THAT YOUR FOOD WOULD RUN OUT BEFORE YOU GOT MONEY TO BUY MORE.: NEVER TRUE

## 2025-03-19 ASSESSMENT — PATIENT HEALTH QUESTIONNAIRE - PHQ9
SUM OF ALL RESPONSES TO PHQ QUESTIONS 1-9: 0
1. LITTLE INTEREST OR PLEASURE IN DOING THINGS: NOT AT ALL
SUM OF ALL RESPONSES TO PHQ QUESTIONS 1-9: 0
2. FEELING DOWN, DEPRESSED OR HOPELESS: NOT AT ALL
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
## 2025-03-19 NOTE — PROGRESS NOTES
Jose David Pierce is a 65 y.o. year old male who presents today for   Chief Complaint   Patient presents with    Pre-op Exam    Medicare AWV        \"Have you been to the ER, urgent care clinic since your last visit?  Hospitalized since your last visit?\"   NO     “Have you seen or consulted any other health care providers outside our system since your last visit?”   NO             Chrissy Ta [facility]  [address] [place] [address] [address]  Ph: [phone]  Direct Fax: [phone]

## 2025-03-26 NOTE — PROGRESS NOTES
Medicare Annual Wellness Visit    Jose David Pierce is here for Pre-op Exam, Medicare AWV, and Back Pain    Assessment & Plan   Pre-op examination  -     EKG 12 Lead  Medicare annual wellness visit, subsequent  Generalized anxiety disorder  Essential (primary) hypertension  Degeneration of intervertebral disc of lumbar region with discogenic back pain and lower extremity pain  Essential hypertension  Mixed hyperlipidemia  -     Lipid Panel; Future       Return in about 8 months (around 11/19/2025), or if symptoms worsen or fail to improve, for medication evaluation, result review.     Subjective   The following acute and/or chronic problems were also addressed today:  Pre op  Anxiety  Hypertension  Lumbar back pain  HLD    Patient's complete Health Risk Assessment and screening values have been reviewed and are found in Flowsheets. The following problems were reviewed today and where indicated follow up appointments were made and/or referrals ordered.    Positive Risk Factor Screenings with Interventions:    Fall Risk:  Do you feel unsteady or are you worried about falling? : (!) yes  2 or more falls in past year?: no  Fall with injury in past year?: no     Interventions:    Reviewed medications, home hazards, visual acuity, and co-morbidities that can increase risk for falls             Inactivity:  On average, how many days per week do you engage in moderate to strenuous exercise (like a brisk walk)?: 0 days (!) Abnormal  On average, how many minutes do you engage in exercise at this level?: 0 min  Interventions:  Exercise as tolerated      Dentist Screen:  Have you seen the dentist within the past year?: (!) No    Intervention:  Advised to schedule with their dentist     Vision Screen:  Visual Acuity screen is abnormal due to a score of 20/25 or worse.  Do you have difficulty driving, watching TV, or doing any of your daily activities because of your eyesight?: No  Have you had an eye exam within the past
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
## 2025-03-26 NOTE — PATIENT INSTRUCTIONS
fluoride toothpaste twice a day--in the morning and at night--and floss at least once a day. Plaque can quickly build up on the teeth of older adults.  Watch for the signs of gum disease. These signs include gums that bleed after brushing or after eating hard foods, such as apples.  See a dentist regularly. Many experts recommend checkups every 6 months.  Keep the dentist up to date on any new medications the person is taking.  Encourage a balanced diet that includes whole grains, vegetables, and fruits, and that is low in saturated fat and sodium.  Encourage the person you're caring for not to use tobacco products. They can affect dental and general health.  Many older adults have a fixed income and feel that they can't afford dental care. But most towns and cities have programs in which dentists help older adults by lowering fees. Contact your area's public health offices or  for information about dental care in your area.  Using a toothbrush  Older adults with arthritis sometimes have trouble brushing their teeth because they can't easily hold the toothbrush. Their hands and fingers may be stiff, painful, or weak. If this is the case, you can:  Offer an electric toothbrush.  Enlarge the handle of a non-electric toothbrush by wrapping a sponge, an elastic bandage, or adhesive tape around it.  Push the toothbrush handle through a ball made of rubber or soft foam.  Make the handle longer and thicker by taping Popsicle sticks or tongue depressors to it.  You may also be able to buy special toothbrushes, toothpaste dispensers, and floss holders.  Your doctor may recommend a soft-bristle toothbrush if the person you care for bleeds easily. Bleeding can happen because of a health problem or from certain medicines.  A toothpaste for sensitive teeth may help if the person you care for has sensitive teeth.  How do you brush and floss someone's teeth?  If the person you are caring for has a hard time cleaning

## 2025-05-01 DIAGNOSIS — J45.40 MODERATE PERSISTENT ASTHMA, UNCOMPLICATED: ICD-10-CM

## 2025-05-01 RX ORDER — ALBUTEROL SULFATE 90 UG/1
AEROSOL, METERED RESPIRATORY (INHALATION)
Qty: 36 G | Refills: 0 | Status: SHIPPED | OUTPATIENT
Start: 2025-05-01

## 2025-05-01 NOTE — TELEPHONE ENCOUNTER
Medication(s) requesting:   Requested Prescriptions     Pending Prescriptions Disp Refills    VENTOLIN  (90 Base) MCG/ACT inhaler [Pharmacy Med Name: Ventolin  (90 Base) MCG/ACT Inhalation Aerosol Solution] 36 g 0     Sig: INHALE 2 PUFFS BY MOUTH EVERY 6 HOURS AS NEEDED FOR WHEEZING AND FOR SHORTNESS OF BREATH       Last office visit:  03/19/2025  Next office visit DMA: Visit date not found

## 2025-05-09 DIAGNOSIS — I10 ESSENTIAL HYPERTENSION: ICD-10-CM

## 2025-05-09 NOTE — TELEPHONE ENCOUNTER
Medication requested :   Requested Prescriptions     Pending Prescriptions Disp Refills    hydroCHLOROthiazide (HYDRODIURIL) 25 MG tablet [Pharmacy Med Name: hydroCHLOROthiazide 25 MG Oral Tablet] 90 tablet 0     Sig: Take 1 tablet by mouth once daily      PCP: Nahid Gómez MD  LOV:           3/19/2025   NOV DMA: Visit date not found  FUTURE APPT: No future appointments.    Thank you.

## 2025-05-13 RX ORDER — HYDROCHLOROTHIAZIDE 25 MG/1
25 TABLET ORAL DAILY
Qty: 90 TABLET | Refills: 3 | Status: SHIPPED | OUTPATIENT
Start: 2025-05-13

## 2025-05-20 ENCOUNTER — OFFICE VISIT (OUTPATIENT)
Facility: CLINIC | Age: 65
End: 2025-05-20
Payer: MEDICARE

## 2025-05-20 VITALS
SYSTOLIC BLOOD PRESSURE: 133 MMHG | OXYGEN SATURATION: 97 % | HEART RATE: 59 BPM | HEIGHT: 67 IN | RESPIRATION RATE: 16 BRPM | DIASTOLIC BLOOD PRESSURE: 72 MMHG | TEMPERATURE: 98.2 F | BODY MASS INDEX: 27.31 KG/M2 | WEIGHT: 174 LBS

## 2025-05-20 DIAGNOSIS — R39.11 BENIGN PROSTATIC HYPERPLASIA WITH URINARY HESITANCY: ICD-10-CM

## 2025-05-20 DIAGNOSIS — Z12.5 ENCOUNTER FOR SCREENING FOR MALIGNANT NEOPLASM OF PROSTATE: Primary | ICD-10-CM

## 2025-05-20 DIAGNOSIS — F41.1 GENERALIZED ANXIETY DISORDER: ICD-10-CM

## 2025-05-20 DIAGNOSIS — M54.16 LUMBAR RADICULAR PAIN: ICD-10-CM

## 2025-05-20 DIAGNOSIS — N40.1 BENIGN PROSTATIC HYPERPLASIA WITH URINARY HESITANCY: ICD-10-CM

## 2025-05-20 DIAGNOSIS — S46.012A TRAUMATIC COMPLETE TEAR OF LEFT ROTATOR CUFF, INITIAL ENCOUNTER: ICD-10-CM

## 2025-05-20 PROCEDURE — 3078F DIAST BP <80 MM HG: CPT | Performed by: FAMILY MEDICINE

## 2025-05-20 PROCEDURE — G8427 DOCREV CUR MEDS BY ELIG CLIN: HCPCS | Performed by: FAMILY MEDICINE

## 2025-05-20 PROCEDURE — 3075F SYST BP GE 130 - 139MM HG: CPT | Performed by: FAMILY MEDICINE

## 2025-05-20 PROCEDURE — 3017F COLORECTAL CA SCREEN DOC REV: CPT | Performed by: FAMILY MEDICINE

## 2025-05-20 PROCEDURE — 99214 OFFICE O/P EST MOD 30 MIN: CPT | Performed by: FAMILY MEDICINE

## 2025-05-20 PROCEDURE — G8419 CALC BMI OUT NRM PARAM NOF/U: HCPCS | Performed by: FAMILY MEDICINE

## 2025-05-20 PROCEDURE — 1123F ACP DISCUSS/DSCN MKR DOCD: CPT | Performed by: FAMILY MEDICINE

## 2025-05-20 PROCEDURE — 1126F AMNT PAIN NOTED NONE PRSNT: CPT | Performed by: FAMILY MEDICINE

## 2025-05-20 PROCEDURE — G2211 COMPLEX E/M VISIT ADD ON: HCPCS | Performed by: FAMILY MEDICINE

## 2025-05-20 PROCEDURE — 4004F PT TOBACCO SCREEN RCVD TLK: CPT | Performed by: FAMILY MEDICINE

## 2025-05-20 PROCEDURE — 1159F MED LIST DOCD IN RCRD: CPT | Performed by: FAMILY MEDICINE

## 2025-05-20 RX ORDER — LORAZEPAM 1 MG/1
1 TABLET ORAL EVERY 8 HOURS PRN
Qty: 30 TABLET | Refills: 5 | Status: SHIPPED | OUTPATIENT
Start: 2025-05-20 | End: 2025-11-16

## 2025-05-20 RX ORDER — PREGABALIN 150 MG/1
150 CAPSULE ORAL 2 TIMES DAILY
Qty: 60 CAPSULE | Refills: 5 | Status: SHIPPED | OUTPATIENT
Start: 2025-05-20 | End: 2025-11-16

## 2025-05-20 RX ORDER — TAMSULOSIN HYDROCHLORIDE 0.4 MG/1
0.4 CAPSULE ORAL DAILY
Qty: 30 CAPSULE | Refills: 5 | Status: SHIPPED | OUTPATIENT
Start: 2025-05-20

## 2025-05-20 SDOH — ECONOMIC STABILITY: FOOD INSECURITY: WITHIN THE PAST 12 MONTHS, YOU WORRIED THAT YOUR FOOD WOULD RUN OUT BEFORE YOU GOT MONEY TO BUY MORE.: NEVER TRUE

## 2025-05-20 SDOH — ECONOMIC STABILITY: FOOD INSECURITY: WITHIN THE PAST 12 MONTHS, THE FOOD YOU BOUGHT JUST DIDN'T LAST AND YOU DIDN'T HAVE MONEY TO GET MORE.: NEVER TRUE

## 2025-05-20 NOTE — PROGRESS NOTES
Jose David Pierce is a 65 y.o. year old male who presents today for No chief complaint on file.      \"Have you been to the ER, urgent care clinic since your last visit?  Hospitalized since your last visit?\"    no    “Have you seen or consulted any other health care providers outside our system since your last visit?”    Ortho          Click Here for Release of Records Request    - ROSELIA Argueta  [facility]  Phone: [phone]  Fax: [phone]

## 2025-05-20 NOTE — PROGRESS NOTES
Jose David Pierce is a 65 y.o.  male and presents with    Chief Complaint   Patient presents with    Back Pain    Medication Refill       Subjective:  Mr. Pierce is c/o weak stream.  He is getting up intermittently to urinate.  He is taking beta sitasterol which has improved his stream.        He was scheduled for back surgery but this was postponed due to nicotine.      ROS   General ROS: negative for - chills, fever, or hot flashes  Respiratory ROS: no cough, shortness of breath, or wheezing  Cardiovascular ROS: no chest pain or dyspnea on exertion  Musculoskeletal ROS: positive for - b/l shoulders and R lower extremity joint pain and muscular weakness  Neurological ROS: positive for - numbness/tingling R lower extremity.    All other systems reviewed and are negative.    Objective:  Vitals:    05/20/25 1516   BP: 133/72   Pulse:    Resp:    Temp:    SpO2:      alert, well appearing, and in no distress  General appearance - alert, well appearing, and in no distress  Chest - clear to auscultation, no wheezes, rales or rhonchi, symmetric air entry  Heart - normal rate, regular rhythm, normal S1, S2, no murmurs, rubs, clicks or gallops  Back exam - limited range of motion, pain with motion noted during exam  Neurological - alert, oriented, normal speech, no focal findings or movement disorder noted, decreased muscle tone, strength 2/5 on R LE, 5/5 L LE.   Musculoskeletal - no muscular tenderness noted, abnormal active range of motion of B/L shoulders and R LE.    LABS     TESTS    Assessment/Plan:    1. Lumbar radicular pain  Pain management ordered  - pregabalin (LYRICA) 150 MG capsule; Take 1 capsule by mouth 2 times daily for 180 days. Max Daily Amount: 300 mg  Dispense: 60 capsule; Refill: 5    2. Encounter for screening for malignant neoplasm of prostate    - PSA Screening; Future    3. Traumatic complete tear of left rotator cuff, initial encounter  Awaiting surgical repair    4. Benign

## 2025-05-22 LAB — SPECIMEN STATUS REPORT: NORMAL

## 2025-05-23 LAB — PSA SERPL-MCNC: 0.9 NG/ML (ref 0–4)

## 2025-05-30 ENCOUNTER — RESULTS FOLLOW-UP (OUTPATIENT)
Facility: CLINIC | Age: 65
End: 2025-05-30

## 2025-06-02 ENCOUNTER — TELEPHONE (OUTPATIENT)
Facility: CLINIC | Age: 65
End: 2025-06-02

## 2025-06-02 NOTE — TELEPHONE ENCOUNTER
Pt called back to check on medication request        please advise           Pt is calling in states his new medication is interacting with his previous and needs to have this switched to something else        pregabalin (LYRICA) 150 MG capsule causing him dizziness,  is interacting with   gabapentin (NEURONTIN) 600 MG tablet (discontinued since 11/07/2024 but he does not want to stop this medication )      Please advise      Thank you

## 2025-06-09 ENCOUNTER — TELEPHONE (OUTPATIENT)
Facility: CLINIC | Age: 65
End: 2025-06-09

## 2025-06-09 NOTE — TELEPHONE ENCOUNTER
Patient called in stating that he currently takes pregabalin. Patient states when he takes that medication, it makes him dizzy. Patient stated that he is suppose to be having \"Back Surgery\" this week. Will like to have medication straighten out before he has surgery. Please be advised, thank you.

## 2025-06-18 ENCOUNTER — TELEPHONE (OUTPATIENT)
Facility: CLINIC | Age: 65
End: 2025-06-18

## 2025-06-23 DIAGNOSIS — M54.16 LUMBAR RADICULAR PAIN: Primary | ICD-10-CM

## 2025-06-23 RX ORDER — GABAPENTIN 600 MG/1
600 TABLET ORAL 3 TIMES DAILY
Qty: 90 TABLET | Refills: 5 | Status: SHIPPED | OUTPATIENT
Start: 2025-06-23 | End: 2025-12-20

## 2025-07-01 ENCOUNTER — TELEPHONE (OUTPATIENT)
Facility: CLINIC | Age: 65
End: 2025-07-01

## 2025-07-01 NOTE — TELEPHONE ENCOUNTER
Spoke w/ Mr. Pierce, he expressed concern about being on Gabapentin and Pregabalin @ the same time stated he needs some medication for his swollen prostate. Told him that he is currently on Tamsulosin (Flomax) for his prostate, he stated that he is not at home and to give him a call back tomorrow to go over his med list.

## 2025-07-02 ENCOUNTER — TELEPHONE (OUTPATIENT)
Facility: CLINIC | Age: 65
End: 2025-07-02

## 2025-07-02 NOTE — TELEPHONE ENCOUNTER
Attempted to return call to pt, reached vm, advised pt to reach out via my chart or return call to office if he has any further concerns.

## 2025-07-14 ENCOUNTER — TELEMEDICINE (OUTPATIENT)
Facility: CLINIC | Age: 65
End: 2025-07-14
Payer: MEDICARE

## 2025-07-14 DIAGNOSIS — N40.1 BENIGN PROSTATIC HYPERPLASIA WITH URINARY HESITANCY: ICD-10-CM

## 2025-07-14 DIAGNOSIS — J45.40 MODERATE PERSISTENT ASTHMA, UNCOMPLICATED: ICD-10-CM

## 2025-07-14 DIAGNOSIS — R39.11 BENIGN PROSTATIC HYPERPLASIA WITH URINARY HESITANCY: ICD-10-CM

## 2025-07-14 PROCEDURE — 1123F ACP DISCUSS/DSCN MKR DOCD: CPT | Performed by: FAMILY MEDICINE

## 2025-07-14 PROCEDURE — 99213 OFFICE O/P EST LOW 20 MIN: CPT | Performed by: FAMILY MEDICINE

## 2025-07-14 PROCEDURE — G8427 DOCREV CUR MEDS BY ELIG CLIN: HCPCS | Performed by: FAMILY MEDICINE

## 2025-07-14 PROCEDURE — 1159F MED LIST DOCD IN RCRD: CPT | Performed by: FAMILY MEDICINE

## 2025-07-14 PROCEDURE — 3017F COLORECTAL CA SCREEN DOC REV: CPT | Performed by: FAMILY MEDICINE

## 2025-07-14 RX ORDER — TAMSULOSIN HYDROCHLORIDE 0.4 MG/1
0.4 CAPSULE ORAL DAILY
Qty: 30 CAPSULE | Refills: 5 | Status: SHIPPED | OUTPATIENT
Start: 2025-07-14

## 2025-07-14 SDOH — ECONOMIC STABILITY: FOOD INSECURITY: WITHIN THE PAST 12 MONTHS, THE FOOD YOU BOUGHT JUST DIDN'T LAST AND YOU DIDN'T HAVE MONEY TO GET MORE.: NEVER TRUE

## 2025-07-14 SDOH — ECONOMIC STABILITY: FOOD INSECURITY: WITHIN THE PAST 12 MONTHS, YOU WORRIED THAT YOUR FOOD WOULD RUN OUT BEFORE YOU GOT MONEY TO BUY MORE.: NEVER TRUE

## 2025-07-14 ASSESSMENT — PATIENT HEALTH QUESTIONNAIRE - PHQ9
SUM OF ALL RESPONSES TO PHQ QUESTIONS 1-9: 0
2. FEELING DOWN, DEPRESSED OR HOPELESS: NOT AT ALL
SUM OF ALL RESPONSES TO PHQ QUESTIONS 1-9: 0
SUM OF ALL RESPONSES TO PHQ QUESTIONS 1-9: 0
1. LITTLE INTEREST OR PLEASURE IN DOING THINGS: NOT AT ALL
SUM OF ALL RESPONSES TO PHQ QUESTIONS 1-9: 0

## 2025-07-14 NOTE — PROGRESS NOTES
2025    TELEHEALTH EVALUATION -- Audio/Visual    HPI:    Jose David Pierce (:  1960) has requested an audio/video evaluation for the following concern(s):    Urinary retention - hesitancy, weak stream and nocturia; he is requesting flomax    Review of Systems    Prior to Visit Medications    Medication Sig Taking? Authorizing Provider   gabapentin (NEURONTIN) 600 MG tablet Take 1 tablet by mouth 3 times daily for 180 days. Max Daily Amount: 1,800 mg Yes Nahid Gómez MD   tamsulosin (FLOMAX) 0.4 MG capsule Take 1 capsule by mouth daily Yes Nahid Gómez MD   LORazepam (ATIVAN) 1 MG tablet Take 1 tablet by mouth every 8 hours as needed for Anxiety for up to 180 days. Max Daily Amount: 3 mg Yes Nahid Gómez MD   hydroCHLOROthiazide (HYDRODIURIL) 25 MG tablet Take 1 tablet by mouth once daily Yes Nahid Gómez MD   VENTOLIN  (90 Base) MCG/ACT inhaler INHALE 2 PUFFS BY MOUTH EVERY 6 HOURS AS NEEDED FOR WHEEZING AND FOR SHORTNESS OF BREATH Yes Nahid Gómez MD   atorvastatin (LIPITOR) 80 MG tablet Take 1 tablet by mouth nightly Yes Nahid Gómez MD   amLODIPine (NORVASC) 5 MG tablet Take 1 tablet by mouth daily Yes Nahid Gómez MD   imiquimod (ALDARA) 5 % cream APPLY ONE PACKET TO THE AFFECTED AREA IN THE EVENING MONDAY-FRIDAY. WASH OFF IN THE MORNING. USE FOR 14 DAYS Yes Dane Ponce MD   cyclobenzaprine (FLEXERIL) 10 MG tablet Take 1 tablet by mouth 3 times daily as needed for Muscle spasms Yes Nahid Gómez MD   ibuprofen (ADVIL;MOTRIN) 600 MG tablet Take 1 tablet by mouth every 6 hours as needed Yes Dane Ponce MD   acetaminophen (TYLENOL) 500 MG tablet Take 2 tablets by mouth every 6 hours as needed Yes Automatic Reconciliation, Ar   aspirin 325 MG tablet Take 1 tablet by mouth daily Yes Automatic Reconciliation, Ar   cetirizine (ZYRTEC) 10 MG tablet Take 1 tablet by mouth daily Yes Automatic Reconciliation, Ar   clobetasol (TEMOVATE) 0.05 % ointment Apply

## 2025-07-14 NOTE — PROGRESS NOTES
Jose David Pierce is a 65 y.o. year old male who presents today for   Chief Complaint   Patient presents with    Medication Check    Medication Refill        \"Have you been to the ER, urgent care clinic since your last visit?  Hospitalized since your last visit?\"   NO     “Have you seen or consulted any other health care providers outside our system since your last visit?”   YES - When: approximately 1 months ago.  Where and Why: [facility]/ LUMBAR SPINE [facility].             Adelita Nickerson  [facility]  Phone: [phone]  Fax: [phone]

## 2025-07-23 ENCOUNTER — OFFICE VISIT (OUTPATIENT)
Facility: CLINIC | Age: 65
End: 2025-07-23
Payer: MEDICARE

## 2025-07-23 VITALS
HEART RATE: 70 BPM | DIASTOLIC BLOOD PRESSURE: 63 MMHG | BODY MASS INDEX: 25.9 KG/M2 | OXYGEN SATURATION: 97 % | TEMPERATURE: 97.7 F | SYSTOLIC BLOOD PRESSURE: 127 MMHG | WEIGHT: 165 LBS | HEIGHT: 67 IN | RESPIRATION RATE: 17 BRPM

## 2025-07-23 DIAGNOSIS — Z09 POSTOPERATIVE EXAMINATION: ICD-10-CM

## 2025-07-23 DIAGNOSIS — Z48.02 ENCOUNTER FOR REMOVAL OF SUTURES: ICD-10-CM

## 2025-07-23 DIAGNOSIS — S81.819A LACERATION OF SHIN: ICD-10-CM
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
## 2025-07-23 DIAGNOSIS — S51.011D LACERATION OF RIGHT ELBOW, SUBSEQUENT ENCOUNTER: ICD-10-CM

## 2025-07-23 PROCEDURE — G2211 COMPLEX E/M VISIT ADD ON: HCPCS | Performed by: FAMILY MEDICINE

## 2025-07-23 PROCEDURE — 3074F SYST BP LT 130 MM HG: CPT | Performed by: FAMILY MEDICINE

## 2025-07-23 PROCEDURE — 3017F COLORECTAL CA SCREEN DOC REV: CPT | Performed by: FAMILY MEDICINE

## 2025-07-23 PROCEDURE — 3078F DIAST BP <80 MM HG: CPT | Performed by: FAMILY MEDICINE

## 2025-07-23 PROCEDURE — 99214 OFFICE O/P EST MOD 30 MIN: CPT | Performed by: FAMILY MEDICINE

## 2025-07-23 PROCEDURE — G8427 DOCREV CUR MEDS BY ELIG CLIN: HCPCS | Performed by: FAMILY MEDICINE

## 2025-07-23 PROCEDURE — 1125F AMNT PAIN NOTED PAIN PRSNT: CPT | Performed by: FAMILY MEDICINE

## 2025-07-23 PROCEDURE — G8419 CALC BMI OUT NRM PARAM NOF/U: HCPCS | Performed by: FAMILY MEDICINE

## 2025-07-23 PROCEDURE — 1123F ACP DISCUSS/DSCN MKR DOCD: CPT | Performed by: FAMILY MEDICINE

## 2025-07-23 PROCEDURE — 4004F PT TOBACCO SCREEN RCVD TLK: CPT | Performed by: FAMILY MEDICINE

## 2025-07-23 SDOH — ECONOMIC STABILITY: FOOD INSECURITY: WITHIN THE PAST 12 MONTHS, THE FOOD YOU BOUGHT JUST DIDN'T LAST AND YOU DIDN'T HAVE MONEY TO GET MORE.: NEVER TRUE

## 2025-07-23 SDOH — ECONOMIC STABILITY: FOOD INSECURITY: WITHIN THE PAST 12 MONTHS, YOU WORRIED THAT YOUR FOOD WOULD RUN OUT BEFORE YOU GOT MONEY TO BUY MORE.: NEVER TRUE

## 2025-07-23 ASSESSMENT — PATIENT HEALTH QUESTIONNAIRE - PHQ9
SUM OF ALL RESPONSES TO PHQ QUESTIONS 1-9: 0
1. LITTLE INTEREST OR PLEASURE IN DOING THINGS: NOT AT ALL
2. FEELING DOWN, DEPRESSED OR HOPELESS: NOT AT ALL

## 2025-07-23 NOTE — PROGRESS NOTES
Jose David Pierce is a 65 y.o. year old male who presents today for   Chief Complaint   Patient presents with    Suture / Staple Removal        \"Have you been to the ER, urgent care clinic since your last visit?  Hospitalized since your last visit?\"   NO     “Have you seen or consulted any other health care providers outside our system since your last visit?”   NO             Chrissy Ta [facility]  [address] [place] [address] [id number]  Ph: [phone]  Direct Fax: [phone]

## 2025-07-23 NOTE — PROGRESS NOTES
Jose David Pierce is a 65 y.o.  male and presents with    Chief Complaint   Patient presents with    Suture / Staple Removal    Post-Op Check     Subjective:  Mr. Pierce is here for [facility] s/p surgery.    S/p lumbar spinal fusion on 6/13/25 with dr. Stewart. He reports improved pain; he is walking with an upright walker for support.      Here today for suture removal. He required sutures to his right elbow and left shin after a mechanical fall on 7/7/25. XR of the right elbow was negative for fractures. He was discharged on Keflex.     ROS   General ROS: negative for - chills or fever    All other systems reviewed and are negative.      Objective:  Vitals:    07/23/25 1439   BP: 127/63   Pulse: 70   Resp: 17   Temp: 97.7 °F (36.5 °C)   SpO2: 97%         alert, well appearing, and in no distress  Utilizing stand up walker. Wearing lumbar brace.   Skin - right elbow laceration with 9 sutures in place, some purulent drainage, erythema and swelling noted. Left shin with 7 sutures.   The wound is well healed without signs of infection.  The sutures are removed. Wound care and activity instructions given. Return prn.    LABS     TESTS      Assessment/Plan:    1. Fall, initial encounter  Fall precautions; use walker as ordered    2. Postoperative examination  Healing well with PT    3. Laceration of right elbow, subsequent encounter  Sutures intact however at site of tension the skin edges are ; recommend a few more days of healing    4. Laceration of shin  Sutures removed    5. Encounter for removal of sutures         Lab review: labs are reviewed, up to date and normal      I have discussed the diagnosis with the patient and the intended plan as seen in the above orders.  The patient has received an after-visit summary and questions were answered concerning future plans.  I have discussed medication side effects and warnings with the patient as well. I have reviewed the plan of care with the

## 2025-07-28 ENCOUNTER — HOSPITAL ENCOUNTER (OUTPATIENT)
Facility: HOSPITAL | Age: 65
Setting detail: SPECIMEN
Discharge: HOME | End: 2025-07-31
Payer: MEDICARE

## 2025-07-28 ENCOUNTER — OFFICE VISIT (OUTPATIENT)
Facility: CLINIC | Age: 65
End: 2025-07-28
Payer: MEDICARE

## 2025-07-28 VITALS
DIASTOLIC BLOOD PRESSURE: 64 MMHG | WEIGHT: 162.4 LBS | HEART RATE: 70 BPM | HEIGHT: 67 IN | BODY MASS INDEX: 25.49 KG/M2 | SYSTOLIC BLOOD PRESSURE: 132 MMHG | OXYGEN SATURATION: 94 % | RESPIRATION RATE: 20 BRPM | TEMPERATURE: 98.1 F

## 2025-07-28 DIAGNOSIS — Z48.02 ENCOUNTER FOR REMOVAL OF SUTURES: ICD-10-CM

## 2025-07-28 DIAGNOSIS — S51.011D LACERATION OF RIGHT ELBOW, SUBSEQUENT ENCOUNTER: ICD-10-CM

## 2025-07-28 DIAGNOSIS — I10 ESSENTIAL HYPERTENSION: ICD-10-CM

## 2025-07-28 DIAGNOSIS — Z00.00 ANNUAL PHYSICAL EXAM: Primary | ICD-10-CM

## 2025-07-28 DIAGNOSIS — E78.2 MIXED HYPERLIPIDEMIA: ICD-10-CM

## 2025-07-28 DIAGNOSIS — J45.40 MODERATE PERSISTENT ASTHMA, UNCOMPLICATED: ICD-10-CM

## 2025-07-28 DIAGNOSIS — M54.16 LUMBAR RADICULAR PAIN: ICD-10-CM

## 2025-07-28 LAB
CHOLEST SERPL-MCNC: 135 MG/DL
HDLC SERPL-MCNC: 73 MG/DL (ref 40–60)
HDLC SERPL: 1.8 (ref 0–5)
LDLC SERPL CALC-MCNC: 51 MG/DL (ref 0–100)
TRIGL SERPL-MCNC: 54 MG/DL (ref 0–150)
VLDLC SERPL CALC-MCNC: 11 MG/DL

## 2025-07-28 PROCEDURE — 1159F MED LIST DOCD IN RCRD: CPT | Performed by: FAMILY MEDICINE

## 2025-07-28 PROCEDURE — 3078F DIAST BP <80 MM HG: CPT | Performed by: FAMILY MEDICINE

## 2025-07-28 PROCEDURE — 80061 LIPID PANEL: CPT

## 2025-07-28 PROCEDURE — 99397 PER PM REEVAL EST PAT 65+ YR: CPT | Performed by: FAMILY MEDICINE

## 2025-07-28 PROCEDURE — 3075F SYST BP GE 130 - 139MM HG: CPT | Performed by: FAMILY MEDICINE

## 2025-07-28 PROCEDURE — 36415 COLL VENOUS BLD VENIPUNCTURE: CPT

## 2025-07-28 SDOH — ECONOMIC STABILITY: FOOD INSECURITY: WITHIN THE PAST 12 MONTHS, THE FOOD YOU BOUGHT JUST DIDN'T LAST AND YOU DIDN'T HAVE MONEY TO GET MORE.: NEVER TRUE

## 2025-07-28 SDOH — ECONOMIC STABILITY: FOOD INSECURITY: WITHIN THE PAST 12 MONTHS, YOU WORRIED THAT YOUR FOOD WOULD RUN OUT BEFORE YOU GOT MONEY TO BUY MORE.: NEVER TRUE

## 2025-07-28 ASSESSMENT — PATIENT HEALTH QUESTIONNAIRE - PHQ9
2. FEELING DOWN, DEPRESSED OR HOPELESS: NOT AT ALL
1. LITTLE INTEREST OR PLEASURE IN DOING THINGS: NOT AT ALL
SUM OF ALL RESPONSES TO PHQ QUESTIONS 1-9: 0

## 2025-07-28 NOTE — PROGRESS NOTES
Jose David Pierce is a 65 y.o.  male and presents with    Chief Complaint   Patient presents with    Procedure     RIGHT ELBOW STITCH REMOVAL    Annual Exam       Subjective:  Well Adult Physical   Patient here for a comprehensive physical exam.The patient reports problems - right side leg pain and weakness with fall risk; using walker  Do you take any herbs or supplements that were not prescribed by a doctor? yes Are you taking calcium supplements? no Are you taking aspirin daily? yes      ROS   General ROS: negative for - chills or fever     All other systems reviewed and are negative.      Objective:    /64 (BP Site: Left Upper Arm, Patient Position: Sitting, BP Cuff Size: Medium Adult)   Pulse 70   Temp 98.1 °F (36.7 °C) (Temporal)   Resp 20   Ht 1.702 m (5' 7\")   Wt 73.7 kg (162 lb 6.4 oz)   SpO2 94%   BMI 25.44 kg/m²     General Appearance:  Alert, cooperative, no distress, appears stated age   Head:  Normocephalic, without obvious abnormality, atraumatic   Eyes:  PERRL, conjunctiva/corneas clear, EOM's intact, fundi benign, both eyes   Ears:  Normal TM's and external ear canals, both ears   Nose: Nares normal, septum midline, mucosa normal, no drainage or sinus tenderness   Throat: Lips, mucosa, and tongue normal; teeth and gums normal   Neck: Supple, symmetrical, trachea midline, no adenopathy, thyroid: not enlarged, symmetric, no tenderness/mass/nodules, no carotid bruit or JVD   Back:   Symmetric, no curvature, ROM normal, no CVA tenderness   Lungs:   Clear to auscultation bilaterally, respirations unlabored   Chest Wall:  No tenderness or deformity   Heart:  Regular rate and rhythm, S1, S2 normal, no murmur, rub or gallop   Abdomen:   Soft, non-tender, bowel sounds active all four quadrants,  no masses, no organomegaly   Genitalia:  deferred   Rectal:  deferred   Extremities: Extremities normal, atraumatic, no cyanosis or edema   Pulses: 2+ and symmetric   Skin: Right shoulder

## 2025-07-28 NOTE — PROGRESS NOTES
Jose David Pierce is a 65 y.o. year old male who presents today for   Chief Complaint   Patient presents with    Procedure     RIGHT ELBOW STITCH REMOVAL        \"Have you been to the ER, urgent care clinic since your last visit?  Hospitalized since your last visit?\"   NO     “Have you seen or consulted any other health care providers outside our system since your last visit?”   NO             Adelita Nickerson  [facility]  Phone: [phone]  Fax: [phone]

## 2025-08-03 DIAGNOSIS — E78.2 MIXED HYPERLIPIDEMIA: ICD-10-CM

## 2025-08-06 RX ORDER — ATORVASTATIN CALCIUM 80 MG/1
80 TABLET, FILM COATED ORAL NIGHTLY
Qty: 90 TABLET | Refills: 3 | Status: SHIPPED | OUTPATIENT
Start: 2025-08-06

## 2025-08-17 DIAGNOSIS — I10 ESSENTIAL HYPERTENSION: ICD-10-CM

## 2025-08-19 RX ORDER — AMLODIPINE BESYLATE 5 MG/1
5 TABLET ORAL DAILY
Qty: 90 TABLET | Refills: 3 | Status: SHIPPED | OUTPATIENT
Start: 2025-08-19

## 2025-08-27 ENCOUNTER — OFFICE VISIT (OUTPATIENT)
Facility: CLINIC | Age: 65
End: 2025-08-27
Payer: MEDICARE

## 2025-08-27 VITALS
WEIGHT: 160 LBS | OXYGEN SATURATION: 98 % | HEART RATE: 79 BPM | BODY MASS INDEX: 25.11 KG/M2 | SYSTOLIC BLOOD PRESSURE: 129 MMHG | DIASTOLIC BLOOD PRESSURE: 59 MMHG | RESPIRATION RATE: 16 BRPM | HEIGHT: 67 IN | TEMPERATURE: 97.3 F

## 2025-08-27 DIAGNOSIS — Z23 NEED FOR VACCINATION AGAINST STREPTOCOCCUS PNEUMONIAE: ICD-10-CM

## 2025-08-27 DIAGNOSIS — M54.16 LUMBAR RADICULAR PAIN: ICD-10-CM

## 2025-08-27 DIAGNOSIS — I10 ESSENTIAL HYPERTENSION: ICD-10-CM

## 2025-08-27 DIAGNOSIS — R51.9 FRONTAL HEADACHE: Primary | ICD-10-CM

## 2025-08-27 DIAGNOSIS — J45.40 MODERATE PERSISTENT ASTHMA, UNCOMPLICATED: ICD-10-CM

## 2025-08-27 PROCEDURE — G0009 ADMIN PNEUMOCOCCAL VACCINE: HCPCS | Performed by: FAMILY MEDICINE

## 2025-08-27 PROCEDURE — G8419 CALC BMI OUT NRM PARAM NOF/U: HCPCS | Performed by: FAMILY MEDICINE

## 2025-08-27 PROCEDURE — 99214 OFFICE O/P EST MOD 30 MIN: CPT | Performed by: FAMILY MEDICINE

## 2025-08-27 PROCEDURE — 3017F COLORECTAL CA SCREEN DOC REV: CPT | Performed by: FAMILY MEDICINE

## 2025-08-27 PROCEDURE — 90677 PCV20 VACCINE IM: CPT | Performed by: FAMILY MEDICINE

## 2025-08-27 PROCEDURE — 3074F SYST BP LT 130 MM HG: CPT | Performed by: FAMILY MEDICINE

## 2025-08-27 PROCEDURE — 4004F PT TOBACCO SCREEN RCVD TLK: CPT | Performed by: FAMILY MEDICINE

## 2025-08-27 PROCEDURE — 1159F MED LIST DOCD IN RCRD: CPT | Performed by: FAMILY MEDICINE

## 2025-08-27 PROCEDURE — 1160F RVW MEDS BY RX/DR IN RCRD: CPT | Performed by: FAMILY MEDICINE

## 2025-08-27 PROCEDURE — 3078F DIAST BP <80 MM HG: CPT | Performed by: FAMILY MEDICINE

## 2025-08-27 PROCEDURE — G8427 DOCREV CUR MEDS BY ELIG CLIN: HCPCS | Performed by: FAMILY MEDICINE

## 2025-08-27 PROCEDURE — 1123F ACP DISCUSS/DSCN MKR DOCD: CPT | Performed by: FAMILY MEDICINE

## 2025-08-27 PROCEDURE — 1125F AMNT PAIN NOTED PAIN PRSNT: CPT | Performed by: FAMILY MEDICINE

## 2025-08-27 RX ORDER — PREGABALIN 100 MG/1
100 CAPSULE ORAL 3 TIMES DAILY
Qty: 270 CAPSULE | Refills: 1 | Status: SHIPPED | OUTPATIENT
Start: 2025-08-27 | End: 2026-02-23

## 2025-08-28 ENCOUNTER — TELEPHONE (OUTPATIENT)
Facility: CLINIC | Age: 65
End: 2025-08-28